# Patient Record
Sex: FEMALE | Race: WHITE | NOT HISPANIC OR LATINO | Employment: OTHER | URBAN - METROPOLITAN AREA
[De-identification: names, ages, dates, MRNs, and addresses within clinical notes are randomized per-mention and may not be internally consistent; named-entity substitution may affect disease eponyms.]

---

## 2017-01-17 ENCOUNTER — OFFICE VISIT (OUTPATIENT)
Dept: FAMILY MEDICINE CLINIC | Facility: CLINIC | Age: 42
End: 2017-01-17

## 2017-01-17 VITALS
HEIGHT: 67 IN | TEMPERATURE: 98.2 F | BODY MASS INDEX: 30.29 KG/M2 | SYSTOLIC BLOOD PRESSURE: 120 MMHG | OXYGEN SATURATION: 98 % | RESPIRATION RATE: 16 BRPM | DIASTOLIC BLOOD PRESSURE: 72 MMHG | WEIGHT: 193 LBS | HEART RATE: 68 BPM

## 2017-01-17 DIAGNOSIS — F51.01 PRIMARY INSOMNIA: ICD-10-CM

## 2017-01-17 DIAGNOSIS — Z13.9 SCREENING: Primary | ICD-10-CM

## 2017-01-17 DIAGNOSIS — F41.9 ANXIETY: ICD-10-CM

## 2017-01-17 LAB
ALBUMIN SERPL-MCNC: 5.3 G/DL (ref 3.5–5.2)
ALBUMIN/GLOB SERPL: 2.3 G/DL
ALP SERPL-CCNC: 58 U/L (ref 39–117)
ALT SERPL-CCNC: 35 U/L (ref 1–33)
AST SERPL-CCNC: 26 U/L (ref 1–32)
BASOPHILS # BLD AUTO: 0.01 10*3/MM3 (ref 0–0.2)
BASOPHILS NFR BLD AUTO: 0.2 % (ref 0–1.5)
BILIRUB SERPL-MCNC: 0.5 MG/DL (ref 0.1–1.2)
BUN SERPL-MCNC: 11 MG/DL (ref 6–20)
BUN/CREAT SERPL: 11 (ref 7–25)
CALCIUM SERPL-MCNC: 10.1 MG/DL (ref 8.6–10.5)
CHLORIDE SERPL-SCNC: 98 MMOL/L (ref 98–107)
CO2 SERPL-SCNC: 24.6 MMOL/L (ref 22–29)
CREAT SERPL-MCNC: 1 MG/DL (ref 0.57–1)
EOSINOPHIL # BLD AUTO: 0.05 10*3/MM3 (ref 0–0.7)
EOSINOPHIL NFR BLD AUTO: 0.8 % (ref 0.3–6.2)
ERYTHROCYTE [DISTWIDTH] IN BLOOD BY AUTOMATED COUNT: 12.3 % (ref 11.7–13)
GLOBULIN SER CALC-MCNC: 2.3 GM/DL
GLUCOSE SERPL-MCNC: 96 MG/DL (ref 65–99)
HCT VFR BLD AUTO: 43.2 % (ref 35.6–45.5)
HGB BLD-MCNC: 13.8 G/DL (ref 11.9–15.5)
IMM GRANULOCYTES # BLD: 0.02 10*3/MM3 (ref 0–0.03)
IMM GRANULOCYTES NFR BLD: 0.3 % (ref 0–0.5)
LYMPHOCYTES # BLD AUTO: 1.9 10*3/MM3 (ref 0.9–4.8)
LYMPHOCYTES NFR BLD AUTO: 29.2 % (ref 19.6–45.3)
MCH RBC QN AUTO: 31.9 PG (ref 26.9–32)
MCHC RBC AUTO-ENTMCNC: 31.9 G/DL (ref 32.4–36.3)
MCV RBC AUTO: 99.8 FL (ref 80.5–98.2)
MONOCYTES # BLD AUTO: 0.76 10*3/MM3 (ref 0.2–1.2)
MONOCYTES NFR BLD AUTO: 11.7 % (ref 5–12)
NEUTROPHILS # BLD AUTO: 3.76 10*3/MM3 (ref 1.9–8.1)
NEUTROPHILS NFR BLD AUTO: 57.8 % (ref 42.7–76)
PLATELET # BLD AUTO: 278 10*3/MM3 (ref 140–500)
POTASSIUM SERPL-SCNC: 4.4 MMOL/L (ref 3.5–5.2)
PROT SERPL-MCNC: 7.6 G/DL (ref 6–8.5)
RBC # BLD AUTO: 4.33 10*6/MM3 (ref 3.9–5.2)
SODIUM SERPL-SCNC: 139 MMOL/L (ref 136–145)
WBC # BLD AUTO: 6.5 10*3/MM3 (ref 4.5–10.7)

## 2017-01-17 PROCEDURE — 99213 OFFICE O/P EST LOW 20 MIN: CPT | Performed by: INTERNAL MEDICINE

## 2017-01-17 RX ORDER — CLONAZEPAM 0.5 MG/1
0.5 TABLET ORAL 2 TIMES DAILY PRN
Qty: 60 TABLET | Refills: 2 | Status: SHIPPED | OUTPATIENT
Start: 2017-01-17 | End: 2017-03-13 | Stop reason: SDUPTHER

## 2017-01-17 RX ORDER — SPIRONOLACTONE 100 MG/1
100 TABLET, FILM COATED ORAL DAILY
Qty: 90 TABLET | Refills: 3 | Status: SHIPPED | OUTPATIENT
Start: 2017-01-17 | End: 2017-12-06

## 2017-01-17 RX ORDER — ZOLPIDEM TARTRATE 10 MG/1
10 TABLET ORAL NIGHTLY
Qty: 30 TABLET | Refills: 2 | Status: SHIPPED | OUTPATIENT
Start: 2017-01-17 | End: 2017-06-09 | Stop reason: SDUPTHER

## 2017-01-17 NOTE — MR AVS SNAPSHOT
Radha Barry   1/17/2017 9:30 AM   Office Visit    Dept Phone:  [phone]   Encounter #:  [id number]    Provider:  Adalberto Livingston MD   Department:  [facility]                Your Full Care Plan              Where to Get Your Medications      These medications were sent to [id number] - [place], [place] - [address] AT [facility] [address] - [phone] PH - [phone] FX  [address], [place] [address]     Phone:  [phone]     spironolactone 100 MG tablet         You can get these medications from any pharmacy     Bring a paper prescription for each of these medications     clonazePAM 0.5 MG tablet    zolpidem 10 MG tablet            Your Updated Medication List          This list is accurate as of: 1/17/17  9:47 AM.  Always use your most recent med list.                azithromycin 250 MG tablet   Commonly known as:  ZITHROMAX Z-BARBARA   Take 2 tablets the first day, then 1 tablet daily for 4 days.       clonazePAM 0.5 MG tablet   Commonly known as:  KlonoPIN   Take 1 tablet by mouth 2 (Two) Times a Day As Needed for anxiety.       ibuprofen 800 MG tablet   Commonly known as:  ADVIL,MOTRIN   Take 1 tablet by mouth every 8 (eight) hours as needed for mild pain (1-3) or moderate pain (4-6).       omeprazole 40 MG capsule   Commonly known as:  priLOSEC   Take 1 capsule by mouth Daily.       spironolactone 100 MG tablet   Commonly known as:  ALDACTONE   Take 1 tablet by mouth Daily.       zolpidem 10 MG tablet   Commonly known as:  AMBIEN   Take 1 tablet by mouth Every Night.               We Performed the Following     CBC & Differential     Comprehensive Metabolic Panel       You Were Diagnosed With        Codes Comments    Screening    -  Primary ICD-10-CM: Z13.9  ICD-9-CM: V82.9       Instructions     None    Patient Instructions History      Upcoming Appointments     Visit Type Date Time Department  "   OFFICE VISIT 1/17/2017  9:30 AM CONCHITA MCDONALD      [employer] Signup     Our records indicate that you have an active [facility] account.    You can view your After Visit Summary by going to [email or web address] and logging in with your [employer] username and password.  If you don't have a [employer] username and password but a parent or guardian has access to your record, the parent or guardian should login with their own [employer] username and password and access your record to view the After Visit Summary.    If you have questions, you can email [email or web address] or call [phone] to talk to our [employer] staff.  Remember, [employer] is NOT to be used for urgent needs.  For medical emergencies, dial 911.               Other Info from Your Visit           Allergies     Penicillins        Reason for Visit     Insomnia med check       Vital Signs     Blood Pressure Pulse Temperature Respirations Height Weight    120/72 68 98.2 °F (36.8 °C) (Oral) 16 67\" (170.2 cm) 193 lb (87.5 kg)    Oxygen Saturation Body Mass Index Smoking Status             98% 30.23 kg/m2 Former Smoker         Problems and Diagnoses Noted     Screening    -  Primary        "

## 2017-01-17 NOTE — PROGRESS NOTES
Subjective   Radha Barry is a 41 y.o. female. Patient is here today for   Chief Complaint   Patient presents with   • Insomnia     med check           Vitals:    01/17/17 0925   BP: 120/72   Pulse: 68   Resp: 16   Temp: 98.2 °F (36.8 °C)   SpO2: 98%       Past Medical History   Diagnosis Date   • GERD (gastroesophageal reflux disease)    • Insomnia       Allergies   Allergen Reactions   • Penicillins       Social History     Social History   • Marital status:      Spouse name: N/A   • Number of children: N/A   • Years of education: N/A     Occupational History   • Not on file.     Social History Main Topics   • Smoking status: Former Smoker   • Smokeless tobacco: Not on file   • Alcohol use Yes      Comment: SOCIAL   • Drug use: Not on file   • Sexual activity: Not on file     Other Topics Concern   • Not on file     Social History Narrative        Current Outpatient Prescriptions:   •  azithromycin (ZITHROMAX Z-BARBARA) 250 MG tablet, Take 2 tablets the first day, then 1 tablet daily for 4 days., Disp: 6 tablet, Rfl: 0  •  clonazePAM (KlonoPIN) 0.5 MG tablet, Take 1 tablet by mouth 2 (Two) Times a Day As Needed for anxiety., Disp: 60 tablet, Rfl: 2  •  ibuprofen (ADVIL,MOTRIN) 800 MG tablet, Take 1 tablet by mouth every 8 (eight) hours as needed for mild pain (1-3) or moderate pain (4-6)., Disp: 90 tablet, Rfl: 3  •  omeprazole (priLOSEC) 40 MG capsule, Take 1 capsule by mouth Daily., Disp: 30 capsule, Rfl: 5  •  spironolactone (ALDACTONE) 100 MG tablet, Take 1 tablet by mouth Daily., Disp: 90 tablet, Rfl: 3  •  zolpidem (AMBIEN) 10 MG tablet, Take 1 tablet by mouth Every Night., Disp: 30 tablet, Rfl: 2     Objective     HPI Comments: She takes clonazepam twice a day sparingly for situational anxiety.  She takes zolpidem 10 mg by mouth daily at bedtime when necessary for insomnia (she takes it most nights).    She would like a refill these medications.    She claims to feel well.    Insomnia          Review  of Systems   Constitutional: Negative.    HENT: Negative.    Respiratory: Negative.    Cardiovascular: Negative.    Musculoskeletal: Negative.    Psychiatric/Behavioral: The patient has insomnia.        Physical Exam   Constitutional: She is oriented to person, place, and time. She appears well-developed.   Cardiovascular: Normal rate.    Pulmonary/Chest: Effort normal and breath sounds normal.   Neurological: She is alert and oriented to person, place, and time.   Psychiatric: She has a normal mood and affect. Her behavior is normal.   Nursing note and vitals reviewed.        Problem List Items Addressed This Visit        Other    Anxiety    Primary insomnia      Other Visit Diagnoses     Screening    -  Primary    Relevant Orders    Comprehensive Metabolic Panel    CBC & Differential            PLAN  Her anxiety appears to be well-controlled.    Her primary insomnia appears to be appropriately treated although I did caution her about the potential for dependency on both the medication she takes for her generalized anxiety and her insomnia.    She does take Spiriva like to for a skin condition she has had) acne).  She takes this on a regular basis.  I think it would be wise to check a CBC and comprehensive metabolic panel on her.    Like to have her back in about 6 months to see how she is doing.  No Follow-up on file.

## 2017-02-17 RX ORDER — CLONAZEPAM 0.5 MG/1
TABLET ORAL
Qty: 60 TABLET | Refills: 0 | OUTPATIENT
Start: 2017-02-17

## 2017-03-01 RX ORDER — AZITHROMYCIN 250 MG/1
TABLET, FILM COATED ORAL
Qty: 6 TABLET | Refills: 0 | OUTPATIENT
Start: 2017-03-01

## 2017-03-13 RX ORDER — CLONAZEPAM 0.5 MG/1
0.5 TABLET ORAL 2 TIMES DAILY PRN
Qty: 60 TABLET | Refills: 2 | Status: SHIPPED | OUTPATIENT
Start: 2017-03-13 | End: 2017-06-09 | Stop reason: SDUPTHER

## 2017-03-14 ENCOUNTER — TELEPHONE (OUTPATIENT)
Dept: FAMILY MEDICINE CLINIC | Facility: CLINIC | Age: 42
End: 2017-03-14

## 2017-03-14 NOTE — TELEPHONE ENCOUNTER
Spoke to patient and informed her that the prescription was ready for    ----- Message from Vikki Valdivia sent at 3/13/2017  8:40 AM EDT -----  CLONAZEPAM REFILL PLEASE CALL PT WHEN READY [phone]

## 2017-06-09 ENCOUNTER — OFFICE VISIT (OUTPATIENT)
Dept: FAMILY MEDICINE CLINIC | Facility: CLINIC | Age: 42
End: 2017-06-09

## 2017-06-09 VITALS
TEMPERATURE: 98.2 F | DIASTOLIC BLOOD PRESSURE: 64 MMHG | HEIGHT: 67 IN | HEART RATE: 77 BPM | OXYGEN SATURATION: 99 % | SYSTOLIC BLOOD PRESSURE: 118 MMHG | WEIGHT: 194 LBS | BODY MASS INDEX: 30.45 KG/M2 | RESPIRATION RATE: 16 BRPM

## 2017-06-09 DIAGNOSIS — F51.01 PRIMARY INSOMNIA: Primary | ICD-10-CM

## 2017-06-09 DIAGNOSIS — F41.9 ANXIETY: ICD-10-CM

## 2017-06-09 PROCEDURE — 99213 OFFICE O/P EST LOW 20 MIN: CPT | Performed by: INTERNAL MEDICINE

## 2017-06-09 RX ORDER — ZOLPIDEM TARTRATE 10 MG/1
10 TABLET ORAL NIGHTLY
Qty: 30 TABLET | Refills: 2 | Status: SHIPPED | OUTPATIENT
Start: 2017-06-09 | End: 2017-12-04 | Stop reason: SDUPTHER

## 2017-06-09 RX ORDER — CLONAZEPAM 0.5 MG/1
0.5 TABLET ORAL 2 TIMES DAILY PRN
Qty: 60 TABLET | Refills: 2 | Status: SHIPPED | OUTPATIENT
Start: 2017-06-09 | End: 2017-09-15 | Stop reason: SDUPTHER

## 2017-06-09 NOTE — PROGRESS NOTES
Subjective   Radha Barry is a 42 y.o. female. Patient is here today for   Chief Complaint   Patient presents with   • Anxiety     med check    • Insomnia          Vitals:    06/09/17 1254   BP: 118/64   Pulse: 77   Resp: 16   Temp: 98.2 °F (36.8 °C)   SpO2: 99%       Past Medical History:   Diagnosis Date   • GERD (gastroesophageal reflux disease)    • Insomnia       Allergies   Allergen Reactions   • Penicillins       Social History     Social History   • Marital status:      Spouse name: N/A   • Number of children: N/A   • Years of education: N/A     Occupational History   • Not on file.     Social History Main Topics   • Smoking status: Former Smoker   • Smokeless tobacco: Not on file   • Alcohol use Yes      Comment: SOCIAL   • Drug use: Not on file   • Sexual activity: Not on file     Other Topics Concern   • Not on file     Social History Narrative        Current Outpatient Prescriptions:   •  azithromycin (ZITHROMAX Z-BARABRA) 250 MG tablet, Take 2 tablets the first day, then 1 tablet daily for 4 days., Disp: 6 tablet, Rfl: 0  •  clonazePAM (KlonoPIN) 0.5 MG tablet, Take 1 tablet by mouth 2 (Two) Times a Day As Needed for Anxiety., Disp: 60 tablet, Rfl: 2  •  ibuprofen (ADVIL,MOTRIN) 800 MG tablet, Take 1 tablet by mouth every 8 (eight) hours as needed for mild pain (1-3) or moderate pain (4-6)., Disp: 90 tablet, Rfl: 3  •  omeprazole (priLOSEC) 40 MG capsule, Take 1 capsule by mouth Daily., Disp: 30 capsule, Rfl: 5  •  spironolactone (ALDACTONE) 100 MG tablet, Take 1 tablet by mouth Daily., Disp: 90 tablet, Rfl: 3  •  zolpidem (AMBIEN) 10 MG tablet, Take 1 tablet by mouth Every Night., Disp: 30 tablet, Rfl: 2     Objective     HPI Comments: She complains of anxiety and insomnia which she feels is well-controlled with infrequent Klonopin 0.5 mg tablets once daily, and Ambien 10 mg daily at bedtime.    She would like to get a refill on these medications.    Anxiety   Symptoms include insomnia.        Insomnia          Review of Systems   Constitutional: Negative.    HENT: Negative.    Respiratory: Negative.    Cardiovascular: Negative.    Genitourinary: Negative.    Hematological: Negative.    Psychiatric/Behavioral: The patient has insomnia.        Physical Exam   Constitutional: She is oriented to person, place, and time. She appears well-developed and well-nourished.   HENT:   Head: Normocephalic and atraumatic.   Pulmonary/Chest: Effort normal.   Neurological: She is alert and oriented to person, place, and time.   Psychiatric: She has a normal mood and affect. Her behavior is normal.   Nursing note and vitals reviewed.        Problem List Items Addressed This Visit        Other    Anxiety    Primary insomnia - Primary            PLAN  I refilled her clonazepam and Ambien.    I asked her to follow-up with me for a comprehensive physical examination once yearly.  No Follow-up on file.

## 2017-08-21 ENCOUNTER — APPOINTMENT (OUTPATIENT)
Dept: WOMENS IMAGING | Facility: HOSPITAL | Age: 42
End: 2017-08-21

## 2017-08-21 PROCEDURE — 77063 BREAST TOMOSYNTHESIS BI: CPT | Performed by: RADIOLOGY

## 2017-08-21 PROCEDURE — 77067 SCR MAMMO BI INCL CAD: CPT | Performed by: RADIOLOGY

## 2017-08-21 RX ORDER — OMEPRAZOLE 40 MG/1
CAPSULE, DELAYED RELEASE ORAL
Qty: 30 CAPSULE | Refills: 0 | Status: SHIPPED | OUTPATIENT
Start: 2017-08-21 | End: 2017-09-20 | Stop reason: SDUPTHER

## 2017-09-15 RX ORDER — CLONAZEPAM 0.5 MG/1
0.5 TABLET ORAL 2 TIMES DAILY PRN
Qty: 60 TABLET | Refills: 2 | Status: SHIPPED | OUTPATIENT
Start: 2017-09-15 | End: 2017-12-04 | Stop reason: SDUPTHER

## 2017-09-18 ENCOUNTER — OFFICE VISIT (OUTPATIENT)
Dept: FAMILY MEDICINE CLINIC | Facility: CLINIC | Age: 42
End: 2017-09-18

## 2017-09-18 VITALS
HEIGHT: 67 IN | BODY MASS INDEX: 32.08 KG/M2 | DIASTOLIC BLOOD PRESSURE: 64 MMHG | TEMPERATURE: 98 F | SYSTOLIC BLOOD PRESSURE: 108 MMHG | RESPIRATION RATE: 16 BRPM | OXYGEN SATURATION: 99 % | HEART RATE: 76 BPM | WEIGHT: 204.4 LBS

## 2017-09-18 DIAGNOSIS — F41.9 ANXIETY: Primary | ICD-10-CM

## 2017-09-18 PROCEDURE — 99213 OFFICE O/P EST LOW 20 MIN: CPT | Performed by: NURSE PRACTITIONER

## 2017-09-18 NOTE — PROGRESS NOTES
Subjective   Radha Barry is a 42 y.o. female. Patient is here today for   Chief Complaint   Patient presents with   • Anxiety     refill clonazapam           Vitals:    09/18/17 1031   BP: 108/64   Pulse: 76   Resp: 16   Temp: 98 °F (36.7 °C)   SpO2: 99%     The following portions of the patient's history were reviewed and updated as appropriate: allergies, current medications, past family history, past medical history, past social history, past surgical history and problem list.    Past Medical History:   Diagnosis Date   • GERD (gastroesophageal reflux disease)    • Insomnia       Allergies   Allergen Reactions   • Penicillins       Social History     Social History   • Marital status:      Spouse name: N/A   • Number of children: N/A   • Years of education: N/A     Occupational History   • Not on file.     Social History Main Topics   • Smoking status: Former Smoker   • Smokeless tobacco: Former User   • Alcohol use Yes      Comment: SOCIAL   • Drug use: Not on file   • Sexual activity: Not on file     Other Topics Concern   • Not on file     Social History Narrative        Current Outpatient Prescriptions:   •  clonazePAM (KlonoPIN) 0.5 MG tablet, Take 1 tablet by mouth 2 (Two) Times a Day As Needed for Anxiety., Disp: 60 tablet, Rfl: 2  •  ibuprofen (ADVIL,MOTRIN) 800 MG tablet, Take 1 tablet by mouth every 8 (eight) hours as needed for mild pain (1-3) or moderate pain (4-6)., Disp: 90 tablet, Rfl: 3  •  omeprazole (priLOSEC) 40 MG capsule, TAKE 1 CAPSULE BY MOUTH DAILY, Disp: 30 capsule, Rfl: 0  •  spironolactone (ALDACTONE) 100 MG tablet, Take 1 tablet by mouth Daily., Disp: 90 tablet, Rfl: 3  •  zolpidem (AMBIEN) 10 MG tablet, Take 1 tablet by mouth Every Night., Disp: 30 tablet, Rfl: 2     Objective     History of Present Illness  Radha is a patient of Dr Livingston who is here for a medication follow up. She has a history of anxiety and takes clonazepam as needed. She is compliant with her medication  and is not experiencing any side effects.     Review of Systems   Constitutional: Negative for chills, diaphoresis and fatigue.   Respiratory: Negative.    Cardiovascular: Negative.    Psychiatric/Behavioral: The patient is nervous/anxious.        Physical Exam   Constitutional: Vital signs are normal. No distress.   Cardiovascular: Normal rate and regular rhythm.    Pulmonary/Chest: Effort normal and breath sounds normal.   Neurological: She is alert.   Skin: Skin is warm and dry.   Psychiatric: She has a normal mood and affect.       ASSESSMENT     Problem List Items Addressed This Visit     Anxiety - Primary          PLAN  Clonazepam refilled per Dr Miki west reviewed and is appropriate  Follow up in 6 months for a med check or sooner if needed

## 2017-09-20 RX ORDER — OMEPRAZOLE 40 MG/1
CAPSULE, DELAYED RELEASE ORAL
Qty: 30 CAPSULE | Refills: 0 | Status: SHIPPED | OUTPATIENT
Start: 2017-09-20 | End: 2017-10-19 | Stop reason: SDUPTHER

## 2017-10-20 RX ORDER — OMEPRAZOLE 40 MG/1
CAPSULE, DELAYED RELEASE ORAL
Qty: 30 CAPSULE | Refills: 0 | Status: SHIPPED | OUTPATIENT
Start: 2017-10-20 | End: 2017-11-24 | Stop reason: SDUPTHER

## 2017-11-27 RX ORDER — OMEPRAZOLE 40 MG/1
CAPSULE, DELAYED RELEASE ORAL
Qty: 30 CAPSULE | Refills: 0 | Status: SHIPPED | OUTPATIENT
Start: 2017-11-27 | End: 2017-12-06 | Stop reason: SDUPTHER

## 2017-12-04 ENCOUNTER — TELEPHONE (OUTPATIENT)
Dept: FAMILY MEDICINE CLINIC | Facility: CLINIC | Age: 42
End: 2017-12-04

## 2017-12-04 RX ORDER — ZOLPIDEM TARTRATE 10 MG/1
10 TABLET ORAL NIGHTLY
Qty: 30 TABLET | Refills: 2 | Status: SHIPPED | OUTPATIENT
Start: 2017-12-04 | End: 2018-03-01 | Stop reason: SDUPTHER

## 2017-12-04 RX ORDER — CLONAZEPAM 0.5 MG/1
0.5 TABLET ORAL 2 TIMES DAILY PRN
Qty: 60 TABLET | Refills: 2 | Status: SHIPPED | OUTPATIENT
Start: 2017-12-04 | End: 2018-03-01 | Stop reason: SDUPTHER

## 2017-12-04 NOTE — TELEPHONE ENCOUNTER
Prescriptions given to Randal   ----- Message from Mark Anthony Vega MA sent at 12/1/2017  3:54 PM EST -----  Contact: PT   PLEASE PRINT OUT SCRIPT FOR AMBIEN AND KLONAZAPAM. PT COMING IN OF DEC 6

## 2017-12-06 ENCOUNTER — TELEPHONE (OUTPATIENT)
Dept: FAMILY MEDICINE CLINIC | Facility: CLINIC | Age: 42
End: 2017-12-06

## 2017-12-06 ENCOUNTER — OFFICE VISIT (OUTPATIENT)
Dept: FAMILY MEDICINE CLINIC | Facility: CLINIC | Age: 42
End: 2017-12-06

## 2017-12-06 VITALS
BODY MASS INDEX: 33.56 KG/M2 | HEIGHT: 67 IN | HEART RATE: 73 BPM | TEMPERATURE: 97.9 F | RESPIRATION RATE: 16 BRPM | OXYGEN SATURATION: 98 % | DIASTOLIC BLOOD PRESSURE: 60 MMHG | WEIGHT: 213.8 LBS | SYSTOLIC BLOOD PRESSURE: 110 MMHG

## 2017-12-06 DIAGNOSIS — F51.01 PRIMARY INSOMNIA: ICD-10-CM

## 2017-12-06 DIAGNOSIS — J06.9 ACUTE URI: ICD-10-CM

## 2017-12-06 DIAGNOSIS — F41.9 ANXIETY: Primary | ICD-10-CM

## 2017-12-06 PROCEDURE — 99213 OFFICE O/P EST LOW 20 MIN: CPT | Performed by: NURSE PRACTITIONER

## 2017-12-06 RX ORDER — BENZONATATE 200 MG/1
200 CAPSULE ORAL 3 TIMES DAILY PRN
Qty: 30 CAPSULE | Refills: 1 | Status: SHIPPED | OUTPATIENT
Start: 2017-12-06 | End: 2018-03-01

## 2017-12-06 RX ORDER — OMEPRAZOLE 40 MG/1
40 CAPSULE, DELAYED RELEASE ORAL DAILY
Qty: 30 CAPSULE | Refills: 0 | Status: SHIPPED | OUTPATIENT
Start: 2017-12-06 | End: 2018-02-10 | Stop reason: SDUPTHER

## 2017-12-06 NOTE — TELEPHONE ENCOUNTER
rx sent to [facility]  ----- Message from Mark Anthony eVga MA sent at 12/6/2017  2:39 PM EST -----      ----- Message -----     From: Kerrie Curtis     Sent: 12/6/2017   1:54 PM       To: Mark Anthony Vega MA    PT HAS DECIDED THAT SHE WANTS THAT COUGH MEDICINE [facility] SUGGESTED FOR HER     PLEASE CALL INTO PHARMACY   [facility] IN [place]    PLEASE CALL PT WITH ANY QUESTIONS   [phone]

## 2017-12-06 NOTE — PROGRESS NOTES
Subjective   Radha Barry is a 42 y.o. female.   Chief Complaint   Patient presents with   • Anxiety     refill clonazepam    • Insomnia     refill zolpidem      Vitals:    12/06/17 1254   BP: 110/60   Pulse: 73   Resp: 16   Temp: 97.9 °F (36.6 °C)   SpO2: 98%     Patient's last menstrual period was 11/27/2017.    History of Present Illness  Radha is a patient of Dr Livingston who is here for a medication follow up. She has a history of insomnia and anxiety. She takes ambien and clonazepam. She is compliant with her medication and is not experiencing any side effects.   She also c/o nasal congestion, drainage, sore throat, and ear pressure that has been going on for 2-3 days. Her  has the same symptoms. She has not tried anything otc        The following portions of the patient's history were reviewed and updated as appropriate: allergies, current medications, past family history, past medical history, past social history, past surgical history and problem list.    Review of Systems   Constitutional: Positive for fatigue. Negative for chills and fever.   HENT: Positive for congestion, ear pain and sore throat.    Respiratory: Positive for cough.    Cardiovascular: Negative.    Psychiatric/Behavioral: Positive for sleep disturbance. The patient is nervous/anxious.        Objective   Physical Exam   Constitutional: Vital signs are normal. She appears well-developed and well-nourished. She appears ill. No distress.   HENT:   Right Ear: Ear canal normal. A middle ear effusion is present.   Left Ear: Ear canal normal. A middle ear effusion is present.   Nose: Mucosal edema and rhinorrhea present.   Mouth/Throat: Uvula is midline. Posterior oropharyngeal erythema present.   Cardiovascular: Normal rate and regular rhythm.    Pulmonary/Chest: Effort normal and breath sounds normal.   Neurological: She is alert.   Skin: Skin is warm and dry.   Psychiatric: She has a normal mood and affect.       Assessment/Plan   Radha  was seen today for anxiety and insomnia.    Diagnoses and all orders for this visit:    Anxiety    Primary insomnia    Acute URI    Other orders  -     omeprazole (priLOSEC) 40 MG capsule; Take 1 capsule by mouth Daily.    1. URI  Symptom treatment for 7-10 days  Work note given for 3 days   Rest and fluids  Tylenol or motrin   Avoid second hand smoke and allergens   Offered tessalon perles but declined   Throat lozenges, humidifier, vicks vapor rub as needed  Follow up if your symptoms persist past 7-10 days or sooner if your symptoms worsen or if you develop new symptoms     2. ambien and clonazepam written per Jacob Tomlinson reviewed     3. Omeprazole refilled per Dr Livingston   Follow up in 3 months for a med check or sooner if needed

## 2018-02-12 RX ORDER — OMEPRAZOLE 40 MG/1
CAPSULE, DELAYED RELEASE ORAL
Qty: 30 CAPSULE | Refills: 0 | Status: SHIPPED | OUTPATIENT
Start: 2018-02-12 | End: 2018-03-14 | Stop reason: SDUPTHER

## 2018-03-01 ENCOUNTER — OFFICE VISIT (OUTPATIENT)
Dept: FAMILY MEDICINE CLINIC | Facility: CLINIC | Age: 43
End: 2018-03-01

## 2018-03-01 VITALS
SYSTOLIC BLOOD PRESSURE: 110 MMHG | OXYGEN SATURATION: 98 % | TEMPERATURE: 97.8 F | WEIGHT: 211 LBS | DIASTOLIC BLOOD PRESSURE: 70 MMHG | HEART RATE: 75 BPM | HEIGHT: 67 IN | RESPIRATION RATE: 16 BRPM | BODY MASS INDEX: 33.12 KG/M2

## 2018-03-01 DIAGNOSIS — F51.01 PRIMARY INSOMNIA: Primary | ICD-10-CM

## 2018-03-01 DIAGNOSIS — F41.9 ANXIETY: ICD-10-CM

## 2018-03-01 DIAGNOSIS — J20.9 ACUTE BRONCHITIS, UNSPECIFIED ORGANISM: ICD-10-CM

## 2018-03-01 PROCEDURE — 99214 OFFICE O/P EST MOD 30 MIN: CPT | Performed by: INTERNAL MEDICINE

## 2018-03-01 RX ORDER — AZITHROMYCIN 250 MG/1
TABLET, FILM COATED ORAL
Qty: 6 TABLET | Refills: 0 | Status: SHIPPED | OUTPATIENT
Start: 2018-03-01 | End: 2018-09-19

## 2018-03-01 RX ORDER — NORETHINDRONE AND ETHINYL ESTRADIOL 0.4-0.035
KIT ORAL
Refills: 3 | COMMUNITY
Start: 2018-02-22 | End: 2019-04-02

## 2018-03-01 RX ORDER — ZOLPIDEM TARTRATE 10 MG/1
10 TABLET ORAL NIGHTLY
Qty: 30 TABLET | Refills: 2 | Status: SHIPPED | OUTPATIENT
Start: 2018-03-01 | End: 2018-04-11

## 2018-03-01 RX ORDER — CLONAZEPAM 0.5 MG/1
0.5 TABLET ORAL 2 TIMES DAILY PRN
Qty: 60 TABLET | Refills: 2 | Status: SHIPPED | OUTPATIENT
Start: 2018-03-01 | End: 2018-06-04

## 2018-03-06 PROBLEM — J20.9 ACUTE BRONCHITIS: Status: ACTIVE | Noted: 2018-03-06

## 2018-03-06 NOTE — PROGRESS NOTES
Subjective   Radha Barry is a 42 y.o. female. Patient is here today for   Chief Complaint   Patient presents with   • Cough   • Insomnia     refill zolpidem    • Anxiety     refill clonazepam           Vitals:    03/01/18 1110   BP: 110/70   Pulse: 75   Resp: 16   Temp: 97.8 °F (36.6 °C)   SpO2: 98%       Past Medical History:   Diagnosis Date   • GERD (gastroesophageal reflux disease)    • Insomnia       Allergies   Allergen Reactions   • Penicillins       Social History     Social History   • Marital status:      Spouse name: N/A   • Number of children: N/A   • Years of education: N/A     Occupational History   • Not on file.     Social History Main Topics   • Smoking status: Former Smoker   • Smokeless tobacco: Former User   • Alcohol use Yes      Comment: SOCIAL   • Drug use: Not on file   • Sexual activity: Not on file     Other Topics Concern   • Not on file     Social History Narrative        Current Outpatient Prescriptions:   •  azithromycin (ZITHROMAX) 250 MG tablet, Take 2 tablets the first day, then 1 tablet daily for 4 days., Disp: 6 tablet, Rfl: 0  •  clonazePAM (KlonoPIN) 0.5 MG tablet, Take 1 tablet by mouth 2 (Two) Times a Day As Needed for Anxiety., Disp: 60 tablet, Rfl: 2  •  ibuprofen (ADVIL,MOTRIN) 800 MG tablet, Take 1 tablet by mouth every 8 (eight) hours as needed for mild pain (1-3) or moderate pain (4-6)., Disp: 90 tablet, Rfl: 3  •  omeprazole (priLOSEC) 40 MG capsule, TAKE 1 CAPSULE BY MOUTH DAILY, Disp: 30 capsule, Rfl: 0  •  VYFEMLA 0.4-35 MG-MCG per tablet, TK 1 T PO D CONTINOUSLY SKIPPING PLACEBO AND GOING TO NEXT PACK, Disp: , Rfl: 3  •  zolpidem (AMBIEN) 10 MG tablet, Take 1 tablet by mouth Every Night., Disp: 30 tablet, Rfl: 2     Objective     HPI Comments: She complains of a cough for the last 10 days or so.      She has chronic sleep onset insomnia.  She finds that Ambien works well to manage this and she requested refill.    She has situational anxiety and takes  clonazepam 0.5 mg twice a day.  She finds this works well for her and she requested refill.    Cough     Insomnia   Associated symptoms include coughing.   Anxiety   Symptoms include insomnia.            Review of Systems   Constitutional: Negative.    HENT: Negative.    Respiratory: Positive for cough.    Cardiovascular: Negative.    Musculoskeletal: Negative.    Psychiatric/Behavioral: The patient has insomnia.        Physical Exam   Constitutional: She is oriented to person, place, and time. She appears well-developed and well-nourished.   HENT:   Head: Normocephalic and atraumatic.   Cardiovascular: Normal rate and regular rhythm.    Pulmonary/Chest: Effort normal. No respiratory distress. She has no wheezes. She has no rales.   She has rhonchi which clear with cough bilaterally.   Neurological: She is alert and oriented to person, place, and time.   Psychiatric: She has a normal mood and affect. Her behavior is normal.   Nursing note and vitals reviewed.        Problem List Items Addressed This Visit        Respiratory    Acute bronchitis       Other    Anxiety    Primary insomnia - Primary            PLAN  I gave her Zithromax for acute bronchitis.    I did refill her Ambien and her clonazepam.  I asked her to use these medications sparingly.  Only gave her a three-month supply she were to take these medications on a scheduled basis.  If she took it less frequently it would last her longer.    No Follow-up on file.

## 2018-03-14 RX ORDER — OMEPRAZOLE 40 MG/1
CAPSULE, DELAYED RELEASE ORAL
Qty: 30 CAPSULE | Refills: 0 | Status: SHIPPED | OUTPATIENT
Start: 2018-03-14 | End: 2018-04-26 | Stop reason: SDUPTHER

## 2018-04-11 RX ORDER — ZOLPIDEM TARTRATE 10 MG/1
TABLET ORAL
Qty: 30 TABLET | Refills: 0 | Status: CANCELLED | OUTPATIENT
Start: 2018-04-11

## 2018-04-11 RX ORDER — ZOLPIDEM TARTRATE 10 MG/1
10 TABLET ORAL NIGHTLY
Qty: 30 TABLET | Refills: 2 | Status: SHIPPED | OUTPATIENT
Start: 2018-04-11 | End: 2018-06-05 | Stop reason: SDUPTHER

## 2018-04-27 RX ORDER — OMEPRAZOLE 40 MG/1
CAPSULE, DELAYED RELEASE ORAL
Qty: 30 CAPSULE | Refills: 0 | Status: SHIPPED | OUTPATIENT
Start: 2018-04-27 | End: 2018-05-25 | Stop reason: SDUPTHER

## 2018-05-25 RX ORDER — OMEPRAZOLE 40 MG/1
CAPSULE, DELAYED RELEASE ORAL
Qty: 30 CAPSULE | Refills: 0 | Status: SHIPPED | OUTPATIENT
Start: 2018-05-25 | End: 2018-07-01 | Stop reason: SDUPTHER

## 2018-06-04 RX ORDER — CLONAZEPAM 0.5 MG/1
0.5 TABLET ORAL 2 TIMES DAILY PRN
Qty: 60 TABLET | Refills: 2 | Status: SHIPPED | OUTPATIENT
Start: 2018-06-04 | End: 2018-08-30 | Stop reason: SDUPTHER

## 2018-06-04 RX ORDER — CLONAZEPAM 0.5 MG/1
TABLET ORAL
Qty: 60 TABLET | Refills: 0 | OUTPATIENT
Start: 2018-06-04

## 2018-06-04 RX ORDER — ZOLPIDEM TARTRATE 10 MG/1
10 TABLET ORAL NIGHTLY
Qty: 30 TABLET | Refills: 0 | OUTPATIENT
Start: 2018-06-04

## 2018-06-04 RX ORDER — ZOLPIDEM TARTRATE 10 MG/1
TABLET ORAL
Qty: 30 TABLET | Refills: 0 | Status: CANCELLED | OUTPATIENT
Start: 2018-06-04

## 2018-06-05 RX ORDER — ZOLPIDEM TARTRATE 10 MG/1
10 TABLET ORAL NIGHTLY
Qty: 30 TABLET | Refills: 2 | Status: SHIPPED | OUTPATIENT
Start: 2018-06-05 | End: 2018-08-30 | Stop reason: SDUPTHER

## 2018-07-02 RX ORDER — OMEPRAZOLE 40 MG/1
CAPSULE, DELAYED RELEASE ORAL
Qty: 30 CAPSULE | Refills: 0 | Status: SHIPPED | OUTPATIENT
Start: 2018-07-02 | End: 2018-08-01 | Stop reason: SDUPTHER

## 2018-08-02 RX ORDER — OMEPRAZOLE 40 MG/1
CAPSULE, DELAYED RELEASE ORAL
Qty: 30 CAPSULE | Refills: 5 | Status: SHIPPED | OUTPATIENT
Start: 2018-08-02 | End: 2019-02-19 | Stop reason: SDUPTHER

## 2018-08-30 RX ORDER — CLONAZEPAM 0.5 MG/1
0.5 TABLET ORAL 2 TIMES DAILY PRN
Qty: 60 TABLET | Refills: 2 | Status: SHIPPED | OUTPATIENT
Start: 2018-08-30 | End: 2018-09-19 | Stop reason: SDUPTHER

## 2018-08-30 RX ORDER — ZOLPIDEM TARTRATE 10 MG/1
10 TABLET ORAL NIGHTLY
Qty: 30 TABLET | Refills: 2 | Status: SHIPPED | OUTPATIENT
Start: 2018-08-30 | End: 2018-09-19 | Stop reason: SDUPTHER

## 2018-09-19 ENCOUNTER — OFFICE VISIT (OUTPATIENT)
Dept: FAMILY MEDICINE CLINIC | Facility: CLINIC | Age: 43
End: 2018-09-19

## 2018-09-19 VITALS
TEMPERATURE: 97.4 F | WEIGHT: 209.6 LBS | HEIGHT: 67 IN | DIASTOLIC BLOOD PRESSURE: 70 MMHG | RESPIRATION RATE: 16 BRPM | HEART RATE: 75 BPM | BODY MASS INDEX: 32.9 KG/M2 | OXYGEN SATURATION: 98 % | SYSTOLIC BLOOD PRESSURE: 120 MMHG

## 2018-09-19 DIAGNOSIS — F41.9 ANXIETY: Primary | ICD-10-CM

## 2018-09-19 DIAGNOSIS — F51.01 PRIMARY INSOMNIA: ICD-10-CM

## 2018-09-19 PROCEDURE — 99213 OFFICE O/P EST LOW 20 MIN: CPT | Performed by: INTERNAL MEDICINE

## 2018-09-19 RX ORDER — INFLUENZA A VIRUS A/SINGAPORE/GP1908/2015 IVR-180A (H1N1) ANTIGEN (PROPIOLACTONE INACTIVATED), INFLUENZA A VIRUS A/SINGAPORE/INFIMH-16-0019/2016 IVR-186 (H3N2) ANTIGEN (PROPIOLACTONE INACTIVATED), INFLUENZA B VIRUS B/MARYLAND/15/2016 ANTIGEN (PROPIOLACTONE INACTIVATED), AND INFLUENZA B VIRUS B/PHUKET/3073/2013 BVR-1B ANTIGEN (PROPIOLACTONE INACTIVATED) 15; 15; 15; 15 UG/.5ML; UG/.5ML; UG/.5ML; UG/.5ML
INJECTION, SUSPENSION INTRAMUSCULAR
COMMUNITY
Start: 2018-09-08 | End: 2019-01-08

## 2018-09-19 RX ORDER — CLONAZEPAM 0.5 MG/1
0.5 TABLET ORAL 2 TIMES DAILY PRN
Qty: 60 TABLET | Refills: 2 | Status: SHIPPED | OUTPATIENT
Start: 2018-09-19 | End: 2018-12-17

## 2018-09-19 RX ORDER — ZOLPIDEM TARTRATE 10 MG/1
10 TABLET ORAL NIGHTLY
Qty: 30 TABLET | Refills: 2 | Status: SHIPPED | OUTPATIENT
Start: 2018-09-19 | End: 2019-03-14 | Stop reason: SDUPTHER

## 2018-09-23 NOTE — PROGRESS NOTES
Subjective   Radha Barry is a 43 y.o. female. Patient is here today for   Chief Complaint   Patient presents with   • Insomnia     refill zolpidem   • Anxiety     refill clonazepam           Vitals:    09/19/18 1041   BP: 120/70   Pulse: 75   Resp: 16   Temp: 97.4 °F (36.3 °C)   SpO2: 98%       Past Medical History:   Diagnosis Date   • GERD (gastroesophageal reflux disease)    • Insomnia       Allergies   Allergen Reactions   • Penicillins       Social History     Social History   • Marital status:      Spouse name: N/A   • Number of children: N/A   • Years of education: N/A     Occupational History   • Not on file.     Social History Main Topics   • Smoking status: Former Smoker   • Smokeless tobacco: Former User   • Alcohol use Yes      Comment: SOCIAL   • Drug use: Unknown   • Sexual activity: Not on file     Other Topics Concern   • Not on file     Social History Narrative   • No narrative on file        Current Outpatient Prescriptions:   •  AFLURIA QUADRIVALENT 0.5 ML suspension prefilled syringe injection, , Disp: , Rfl:   •  clonazePAM (KlonoPIN) 0.5 MG tablet, Take 1 tablet by mouth 2 (Two) Times a Day As Needed for Anxiety., Disp: 60 tablet, Rfl: 2  •  ibuprofen (ADVIL,MOTRIN) 800 MG tablet, Take 1 tablet by mouth every 8 (eight) hours as needed for mild pain (1-3) or moderate pain (4-6)., Disp: 90 tablet, Rfl: 3  •  omeprazole (priLOSEC) 40 MG capsule, TAKE ONE CAPSULE BY MOUTH EVERY DAY, Disp: 30 capsule, Rfl: 5  •  VYFEMLA 0.4-35 MG-MCG per tablet, TK 1 T PO D CONTINOUSLY SKIPPING PLACEBO AND GOING TO NEXT PACK, Disp: , Rfl: 3  •  zolpidem (AMBIEN) 10 MG tablet, Take 1 tablet by mouth Every Night., Disp: 30 tablet, Rfl: 2     Objective     This patient has generalized anxiety disorder and chronic idiopathic sleep onset insomnia.  She is here today to get a refill on clonazepam and Ambien.  She feels these medications work well for her.      Insomnia     Anxiety   Symptoms include insomnia.             Review of Systems   Constitutional: Negative.    HENT: Negative.    Eyes: Negative.    Respiratory: Negative.    Cardiovascular: Negative.    Psychiatric/Behavioral: Positive for sleep disturbance. The patient has insomnia.         She has generalized anxiety and insomnia.  She feels these chronic problems are well-controlled on clonazepam and Ambien respectively.       Physical Exam   Constitutional: She is oriented to person, place, and time. She appears well-developed and well-nourished.   Pleasant, neatly groomed, BMI 32.   HENT:   Head: Normocephalic and atraumatic.   Pulmonary/Chest: Effort normal.   Neurological: She is alert and oriented to person, place, and time.   Psychiatric: She has a normal mood and affect. Her behavior is normal.   Nursing note and vitals reviewed.        Problem List Items Addressed This Visit        Other    Anxiety - Primary    Primary insomnia            PLAN  Her generalized anxiety and insomnia are well-controlled on clonazepam and Ambien.  A refill of these medications for her today.    She tells me that she follows up with gynecology regularly.    I asked her to follow-up for a comprehensive physical examination occasionally.  No Follow-up on file.

## 2018-12-16 ENCOUNTER — APPOINTMENT (OUTPATIENT)
Dept: CT IMAGING | Facility: HOSPITAL | Age: 43
End: 2018-12-16

## 2018-12-16 ENCOUNTER — HOSPITAL ENCOUNTER (EMERGENCY)
Facility: HOSPITAL | Age: 43
Discharge: HOME | End: 2018-12-16
Attending: EMERGENCY MEDICINE | Admitting: EMERGENCY MEDICINE

## 2018-12-16 VITALS
HEART RATE: 86 BPM | OXYGEN SATURATION: 97 % | WEIGHT: 214.9 LBS | BODY MASS INDEX: 34.54 KG/M2 | HEIGHT: 66 IN | TEMPERATURE: 98.6 F | SYSTOLIC BLOOD PRESSURE: 139 MMHG | RESPIRATION RATE: 16 BRPM | DIASTOLIC BLOOD PRESSURE: 83 MMHG

## 2018-12-16 DIAGNOSIS — K52.9 COLITIS: Primary | ICD-10-CM

## 2018-12-16 DIAGNOSIS — D25.9 UTERINE LEIOMYOMA, UNSPECIFIED LOCATION: ICD-10-CM

## 2018-12-16 LAB
ALBUMIN SERPL-MCNC: 4.1 G/DL (ref 3.5–5.2)
ALBUMIN/GLOB SERPL: 1.5 G/DL
ALP SERPL-CCNC: 47 U/L (ref 40–129)
ALT SERPL W P-5'-P-CCNC: 21 U/L (ref 5–33)
ANION GAP SERPL CALCULATED.3IONS-SCNC: 14.6 MMOL/L
AST SERPL-CCNC: 16 U/L (ref 5–32)
BACTERIA UR QL AUTO: ABNORMAL /HPF
BASOPHILS # BLD AUTO: 0.01 10*3/MM3 (ref 0–0.2)
BASOPHILS NFR BLD AUTO: 0.1 % (ref 0–2)
BILIRUB SERPL-MCNC: 0.3 MG/DL (ref 0.2–1.2)
BILIRUB UR QL STRIP: NEGATIVE
BUN BLD-MCNC: 12 MG/DL (ref 6–20)
BUN/CREAT SERPL: 17.4 (ref 7–25)
CALCIUM SPEC-SCNC: 9 MG/DL (ref 8.6–10.5)
CHLORIDE SERPL-SCNC: 102 MMOL/L (ref 98–107)
CLARITY UR: CLEAR
CO2 SERPL-SCNC: 20.4 MMOL/L (ref 22–29)
COLOR UR: YELLOW
CREAT BLD-MCNC: 0.69 MG/DL (ref 0.57–1)
DEPRECATED RDW RBC AUTO: 41.4 FL (ref 37–54)
EOSINOPHIL # BLD AUTO: 0.07 10*3/MM3 (ref 0.1–0.3)
EOSINOPHIL NFR BLD AUTO: 0.7 % (ref 0–4)
ERYTHROCYTE [DISTWIDTH] IN BLOOD BY AUTOMATED COUNT: 12.6 % (ref 11.5–14.5)
GFR SERPL CREATININE-BSD FRML MDRD: 94 ML/MIN/1.73
GLOBULIN UR ELPH-MCNC: 2.7 GM/DL
GLUCOSE BLD-MCNC: 130 MG/DL (ref 65–99)
GLUCOSE UR STRIP-MCNC: NEGATIVE MG/DL
HCG SERPL QL: NEGATIVE
HCT VFR BLD AUTO: 37.1 % (ref 37–47)
HGB BLD-MCNC: 12.4 G/DL (ref 12–16)
HGB UR QL STRIP.AUTO: ABNORMAL
HYALINE CASTS UR QL AUTO: ABNORMAL /LPF
IMM GRANULOCYTES # BLD: 0.03 10*3/MM3 (ref 0–0.03)
IMM GRANULOCYTES NFR BLD: 0.3 % (ref 0–0.5)
KETONES UR QL STRIP: NEGATIVE
LEUKOCYTE ESTERASE UR QL STRIP.AUTO: NEGATIVE
LIPASE SERPL-CCNC: 30 U/L (ref 13–60)
LYMPHOCYTES # BLD AUTO: 1.51 10*3/MM3 (ref 0.6–4.8)
LYMPHOCYTES NFR BLD AUTO: 16.1 % (ref 20–45)
MCH RBC QN AUTO: 30.2 PG (ref 27–31)
MCHC RBC AUTO-ENTMCNC: 33.4 G/DL (ref 31–37)
MCV RBC AUTO: 90.5 FL (ref 81–99)
MONOCYTES # BLD AUTO: 0.69 10*3/MM3 (ref 0–1)
MONOCYTES NFR BLD AUTO: 7.3 % (ref 3–8)
MUCOUS THREADS URNS QL MICRO: ABNORMAL /HPF
NEUTROPHILS # BLD AUTO: 7.08 10*3/MM3 (ref 1.5–8.3)
NEUTROPHILS NFR BLD AUTO: 75.5 % (ref 45–70)
NITRITE UR QL STRIP: NEGATIVE
NRBC BLD MANUAL-RTO: 0 /100 WBC (ref 0–0)
PH UR STRIP.AUTO: 5.5 [PH] (ref 4.5–8)
PLATELET # BLD AUTO: 267 10*3/MM3 (ref 140–500)
PMV BLD AUTO: 9.9 FL (ref 7.4–10.4)
POTASSIUM BLD-SCNC: 3.5 MMOL/L (ref 3.5–5.2)
PROT SERPL-MCNC: 6.8 G/DL (ref 6–8.5)
PROT UR QL STRIP: NEGATIVE
RBC # BLD AUTO: 4.1 10*6/MM3 (ref 4.2–5.4)
RBC # UR: ABNORMAL /HPF
REF LAB TEST METHOD: ABNORMAL
SODIUM BLD-SCNC: 137 MMOL/L (ref 136–145)
SP GR UR STRIP: 1.02 (ref 1–1.03)
SQUAMOUS #/AREA URNS HPF: ABNORMAL /HPF
UROBILINOGEN UR QL STRIP: ABNORMAL
WBC NRBC COR # BLD: 9.39 10*3/MM3 (ref 4.8–10.8)
WBC UR QL AUTO: ABNORMAL /HPF

## 2018-12-16 PROCEDURE — 85025 COMPLETE CBC W/AUTO DIFF WBC: CPT | Performed by: EMERGENCY MEDICINE

## 2018-12-16 PROCEDURE — 0 IOPAMIDOL PER 1 ML: Performed by: EMERGENCY MEDICINE

## 2018-12-16 PROCEDURE — 25010000002 ONDANSETRON PER 1 MG: Performed by: EMERGENCY MEDICINE

## 2018-12-16 PROCEDURE — 83690 ASSAY OF LIPASE: CPT | Performed by: EMERGENCY MEDICINE

## 2018-12-16 PROCEDURE — 84703 CHORIONIC GONADOTROPIN ASSAY: CPT | Performed by: EMERGENCY MEDICINE

## 2018-12-16 PROCEDURE — 80053 COMPREHEN METABOLIC PANEL: CPT | Performed by: EMERGENCY MEDICINE

## 2018-12-16 PROCEDURE — 0 DIATRIZOATE MEGLUMINE & SODIUM PER 1 ML: Performed by: EMERGENCY MEDICINE

## 2018-12-16 PROCEDURE — 96374 THER/PROPH/DIAG INJ IV PUSH: CPT

## 2018-12-16 PROCEDURE — 81001 URINALYSIS AUTO W/SCOPE: CPT | Performed by: EMERGENCY MEDICINE

## 2018-12-16 PROCEDURE — 74177 CT ABD & PELVIS W/CONTRAST: CPT

## 2018-12-16 PROCEDURE — 99283 EMERGENCY DEPT VISIT LOW MDM: CPT

## 2018-12-16 PROCEDURE — 99284 EMERGENCY DEPT VISIT MOD MDM: CPT | Performed by: EMERGENCY MEDICINE

## 2018-12-16 RX ORDER — CIPROFLOXACIN 500 MG/1
500 TABLET, FILM COATED ORAL 2 TIMES DAILY
Qty: 20 TABLET | Refills: 0 | Status: SHIPPED | OUTPATIENT
Start: 2018-12-16 | End: 2018-12-26

## 2018-12-16 RX ORDER — ONDANSETRON 2 MG/ML
4 INJECTION INTRAMUSCULAR; INTRAVENOUS ONCE
Status: COMPLETED | OUTPATIENT
Start: 2018-12-16 | End: 2018-12-16

## 2018-12-16 RX ORDER — HYDROCODONE BITARTRATE AND ACETAMINOPHEN 5; 325 MG/1; MG/1
1-2 TABLET ORAL EVERY 4 HOURS PRN
Qty: 24 TABLET | Refills: 0 | Status: SHIPPED | OUTPATIENT
Start: 2018-12-16 | End: 2019-03-14

## 2018-12-16 RX ORDER — METRONIDAZOLE 500 MG/1
500 TABLET ORAL 3 TIMES DAILY
Qty: 30 TABLET | Refills: 0 | Status: SHIPPED | OUTPATIENT
Start: 2018-12-16 | End: 2018-12-26

## 2018-12-16 RX ORDER — SODIUM CHLORIDE 0.9 % (FLUSH) 0.9 %
10 SYRINGE (ML) INJECTION AS NEEDED
Status: DISCONTINUED | OUTPATIENT
Start: 2018-12-16 | End: 2018-12-16 | Stop reason: HOSPADM

## 2018-12-16 RX ADMIN — ONDANSETRON 4 MG: 2 INJECTION, SOLUTION INTRAMUSCULAR; INTRAVENOUS at 09:45

## 2018-12-16 RX ADMIN — IOPAMIDOL 100 ML: 755 INJECTION, SOLUTION INTRAVENOUS at 11:40

## 2018-12-16 RX ADMIN — DIATRIZOATE MEGLUMINE AND DIATRIZOATE SODIUM 30 ML: 600; 100 SOLUTION ORAL; RECTAL at 09:40

## 2018-12-16 NOTE — ED PROVIDER NOTES
Subjective     History provided by:  Patient    History of Present Illness    · Chief complaint: Abdominal pain    · Location: Left lower quadrant of the abdomen    · Quality/Severity: The pain is severe and constant    · Timing/Onset: Started gradually yesterday morning and by yesterday afternoon and was intense.    · Modifying Factors: Any movement or walking exacerbates her pain    · Associated symptoms: She denies associated nausea, vomiting, urinary symptoms, fever.  She denies any vaginal discharge or bleeding.    · Narrative: The patient is a 43-year-old white female complaining of left lower quadrant abdominal pain that started yesterday.  The pain is been constant.  The pain is exacerbated by moving.  Her past medical history is significant for anxiety, uterine fibroids, ovarian cyst.  Her only surgeries is a .  Her last menstrual period was 6 days ago and she takes birth control pills.  Social history the patient is , she works in an office, she smokes, she rarely drinks.    ED Triage Vitals [18 0915]   Temp Heart Rate Resp BP SpO2   98 °F (36.7 °C) 96 18 145/78 96 %      Temp src Heart Rate Source Patient Position BP Location FiO2 (%)   -- -- -- -- --       Review of Systems   Constitutional: Negative for activity change, appetite change, chills, diaphoresis, fatigue and fever.   HENT: Negative for congestion, dental problem, ear pain, hearing loss, mouth sores, postnasal drip, rhinorrhea, sinus pressure, sore throat and voice change.    Eyes: Negative for photophobia, pain, discharge, redness and visual disturbance.   Respiratory: Negative for cough, chest tightness, shortness of breath, wheezing and stridor.    Cardiovascular: Negative for chest pain, palpitations and leg swelling.   Gastrointestinal: Positive for abdominal pain (left lower quad). Negative for diarrhea, nausea and vomiting.   Genitourinary: Negative for difficulty urinating, dysuria, flank pain, frequency,  hematuria and urgency.   Musculoskeletal: Negative for arthralgias, back pain, gait problem, joint swelling, myalgias, neck pain and neck stiffness.   Skin: Negative for color change and rash.   Neurological: Negative for dizziness, tremors, seizures, syncope, facial asymmetry, speech difficulty, weakness, light-headedness, numbness and headaches.   Hematological: Negative for adenopathy.   Psychiatric/Behavioral: Negative.  Negative for confusion and decreased concentration. The patient is not nervous/anxious.        Past Medical History:   Diagnosis Date   • Anxiety    • GERD (gastroesophageal reflux disease)    • Insomnia    • Ovarian cyst        Allergies   Allergen Reactions   • Penicillins Anaphylaxis       Past Surgical History:   Procedure Laterality Date   • BREAST SURGERY     •  SECTION     • CHOLECYSTECTOMY     • GALLBLADDER SURGERY     • WISDOM TOOTH EXTRACTION         Family History   Problem Relation Age of Onset   • Migraines Mother        Social History     Socioeconomic History   • Marital status:      Spouse name: Not on file   • Number of children: Not on file   • Years of education: Not on file   • Highest education level: Not on file   Tobacco Use   • Smoking status: Former Smoker     Types: Cigarettes     Last attempt to quit: 2008     Years since quitting: 10.9   • Smokeless tobacco: Former User   Substance and Sexual Activity   • Alcohol use: Yes     Alcohol/week: 1.2 oz     Types: 2 Standard drinks or equivalent per week     Comment: SOCIAL   • Drug use: No   • Sexual activity: Defer           Objective   Physical Exam   Constitutional: She is oriented to person, place, and time. She appears well-developed and well-nourished. No distress.   The patient appears in no acute distress.  Vital signs: She is afebrile with temperature 90.8, mildly tachycardic with heart rate 96, blood pressure slightly elevated 145/78, respirations are normal 18 with a normal oxygen saturation  of 96% on room air.   HENT:   Head: Normocephalic and atraumatic.   Nose: Nose normal.   Mouth/Throat: Oropharynx is clear and moist. No oropharyngeal exudate.   Eyes: EOM are normal. Pupils are equal, round, and reactive to light. Right eye exhibits no discharge. Left eye exhibits no discharge. No scleral icterus.   Neck: Normal range of motion. Neck supple. No JVD present. No thyromegaly present.   Cardiovascular: Normal rate, regular rhythm and normal heart sounds.   No murmur heard.  Pulmonary/Chest: Effort normal and breath sounds normal. She has no wheezes. She has no rales. She exhibits no tenderness.   Abdominal: Soft. Bowel sounds are normal. She exhibits no distension. There is tenderness in the left lower quadrant. There is rebound and guarding. No hernia.   Musculoskeletal: Normal range of motion. She exhibits no edema, tenderness or deformity.   Lymphadenopathy:     She has no cervical adenopathy.   Neurological: She is alert and oriented to person, place, and time. No cranial nerve deficit. Coordination normal.   No focal motor sensory deficit   Skin: Skin is warm and dry. Capillary refill takes less than 2 seconds. No rash noted. She is not diaphoretic.   Psychiatric: She has a normal mood and affect. Her behavior is normal. Judgment and thought content normal.   Nursing note and vitals reviewed.      Procedures           ED Course  ED Course as of Dec 16 1305   Sun Dec 16, 2018   1224 [facility] Report [id number]  [TP]   1225 Review the patient's test results: Her CT of the abdomen with oral and IV contrast shows stranding along the descending colon consistent with colitis or mild diverticulitis.  No definite diverticular disease seen.  No obstruction, free air or, or drainable fluid collection.  Negative appendix.  Abnormal appearing uterus with complex fluid-filled cyst or mass.  Per Dr. Millard.  Her CBC had a normal white count of 9.39 with a slight left shift.  Hemoglobin, hematocrit and platelets  within normal limits.  Her CMP had normal electrolytes, normal renal and liver function tests.  Lipase was normal.  Urinalysis was negative for infection.  [TP]   1302 The patient was discharged with prescriptions for Cipro floxacillin 500 mg #20, Flagyl 5 mg #30, and Lortab 5 mg #24.  She is instructed to make an appointment to follow-up with her PCP Dr. Livingston next week.  I also instructed her to follow-up with her gynecologist concerning her uterine fibroid.  [TP]   1303 She was given a work note through Wednesday.  [TP]      ED Course User Index  [TP] Artemio Barrios MD                  MDM  Number of Diagnoses or Management Options  Colitis: new and requires workup  Uterine leiomyoma, unspecified location:      Amount and/or Complexity of Data Reviewed  Clinical lab tests: ordered and reviewed  Tests in the radiology section of CPT®: ordered and reviewed  Independent visualization of images, tracings, or specimens: yes    Risk of Complications, Morbidity, and/or Mortality  Presenting problems: high  Diagnostic procedures: high  Management options: high  General comments: My differential diagnosis for abdominal pain includes but is not limited to:  Gastritis, gastroenteritis, peptic ulcer disease, GERD, esophageal perforation, acute appendicitis, mesenteric adenitis, Meckel’s diverticulum, epiploic appendagitis, diverticulitis, colon cancer, ulcerative colitis, Crohn’s disease, intussusception, small bowel obstruction, adhesions, ischemic bowel, perforated viscus, ileus, obstipation, biliary colic, cholecystitis, cholelithiasis, Genaro-Saravanan Evan, hepatitis, pancreatitis, common bile duct obstruction, cholangitis, bile leak, splenic trauma, splenic rupture, splenic infarction, splenic abscess, abdominal abscess, ascites, spontaneous bacterial peritonitis, hernia, UTI, cystitis, prostatitis, ureterolithiasis, urinary obstruction, ovarian cyst, torsion, pregnancy, ectopic pregnancy, PID, pelvic abscess,  ramiro, endometriosis, AAA, myocardial infarction, pneumonia, cancer, porphyria, DKA, medications, sickle cell, viral syndrome, zoster    Patient Progress  Patient progress: stable        Final diagnoses:   Colitis   Uterine leiomyoma, unspecified location           Labs Reviewed   COMPREHENSIVE METABOLIC PANEL - Abnormal; Notable for the following components:       Result Value    Glucose 130 (*)     CO2 20.4 (*)     All other components within normal limits   URINALYSIS W/ MICROSCOPIC IF INDICATED (NO CULTURE) - Abnormal; Notable for the following components:    Blood, UA Trace (*)     All other components within normal limits   CBC WITH AUTO DIFFERENTIAL - Abnormal; Notable for the following components:    RBC 4.10 (*)     Neutrophil % 75.5 (*)     Lymphocyte % 16.1 (*)     Eosinophils, Absolute 0.07 (*)     All other components within normal limits   URINALYSIS, MICROSCOPIC ONLY - Abnormal; Notable for the following components:    RBC, UA 0-2 (*)     WBC, UA 0-2 (*)     Bacteria, UA Trace (*)     Squamous Epithelial Cells, UA 7-12 (*)     All other components within normal limits   LIPASE - Normal   HCG, SERUM, QUALITATIVE - Normal   CBC AND DIFFERENTIAL    Narrative:     The following orders were created for panel order CBC & Differential.  Procedure                               Abnormality         Status                     ---------                               -----------         ------                     CBC Auto Differential[[id number]]        Abnormal            Final result                 Please view results for these tests on the individual orders.     CT Abdomen Pelvis With Contrast   ED Interpretation   Stranding along the descending colon consistent with colitis is or mild diverticulitis.  No definite diverticular disease seen.  No obstruction, free air or drainable fluid collection.  Negative appendix.  Abnormal appearing uterus with complex fluid-filled cyst or mass.  Needs follow-up with  pelvic ultrasound.  Per Dr. Millard             Medication List      New Prescriptions    ciprofloxacin 500 MG tablet  Commonly known as:  CIPRO  Take 1 tablet by mouth 2 (Two) Times a Day for 10 days.     HYDROcodone-acetaminophen 5-325 MG per tablet  Commonly known as:  NORCO  Take 1-2 tablets by mouth Every 4 (Four) Hours As Needed for Severe Pain    for up to 24 doses.     metroNIDAZOLE 500 MG tablet  Commonly known as:  FLAGYL  Take 1 tablet by mouth 3 (Three) Times a Day for 10 days.               Artemio Barrios MD  12/16/18 8710

## 2018-12-17 RX ORDER — CLONAZEPAM 0.5 MG/1
0.5 TABLET ORAL 2 TIMES DAILY PRN
Qty: 60 TABLET | Refills: 2 | Status: SHIPPED | OUTPATIENT
Start: 2018-12-17 | End: 2019-03-14 | Stop reason: SDUPTHER

## 2018-12-19 ENCOUNTER — TELEPHONE (OUTPATIENT)
Dept: FAMILY MEDICINE CLINIC | Facility: CLINIC | Age: 43
End: 2018-12-19

## 2018-12-19 NOTE — TELEPHONE ENCOUNTER
CALLED PT ADVISED HER PRESCRIPTION WAS READY FOR  IN OUR OFFICE. PT UNDERSTOOD.    ----- Message from Vikki Meeks sent at 12/17/2018  3:23 PM EST -----  PATIENT CALLED AND SAID SHE WAS JUST RELEASED FROM THE HOSPITAL WITH COLITIOUS AND HER CLONAZEPAM 0.5 QTY: 60 1BID WILL RUN OUT ON THE 21ST. SHE WANTS TO KNOW IF DR LONG COULD WRITE IT THIS TIME AND HAVE SOMEONE PICK IT UP BECAUSE IS CANT DRIVE WHILE ON MEDICATION GIVEN BY THE HOSPITAL.    PLEASE CLL PT WITH ANY ISSUES OR WHEN SCRIPT IS READY. [phone]

## 2019-01-08 ENCOUNTER — OFFICE VISIT (OUTPATIENT)
Dept: FAMILY MEDICINE CLINIC | Facility: CLINIC | Age: 44
End: 2019-01-08

## 2019-01-08 VITALS
SYSTOLIC BLOOD PRESSURE: 100 MMHG | TEMPERATURE: 98.1 F | RESPIRATION RATE: 16 BRPM | WEIGHT: 204.6 LBS | HEIGHT: 67 IN | DIASTOLIC BLOOD PRESSURE: 66 MMHG | OXYGEN SATURATION: 99 % | HEART RATE: 77 BPM | BODY MASS INDEX: 32.11 KG/M2

## 2019-01-08 DIAGNOSIS — K52.9 COLITIS: Primary | ICD-10-CM

## 2019-01-08 PROCEDURE — 99213 OFFICE O/P EST LOW 20 MIN: CPT | Performed by: INTERNAL MEDICINE

## 2019-01-08 RX ORDER — CIPROFLOXACIN 500 MG/1
500 TABLET, FILM COATED ORAL 2 TIMES DAILY
Qty: 14 TABLET | Refills: 0 | Status: SHIPPED | OUTPATIENT
Start: 2019-01-08 | End: 2019-02-28

## 2019-01-08 NOTE — PROGRESS NOTES
Subjective   Radha Barry is a 43 y.o. female. Patient is here today for   Chief Complaint   Patient presents with   • Follow-up     HOSPITAL FOLLOWUP COLITIS           Vitals:    01/08/19 1055   BP: 100/66   Pulse: 77   Resp: 16   Temp: 98.1 °F (36.7 °C)   SpO2: 99%       Past Medical History:   Diagnosis Date   • Anxiety    • GERD (gastroesophageal reflux disease)    • Insomnia    • Ovarian cyst       Allergies   Allergen Reactions   • Penicillins Anaphylaxis      Social History     Socioeconomic History   • Marital status:      Spouse name: Not on file   • Number of children: Not on file   • Years of education: Not on file   • Highest education level: Not on file   Social Needs   • Financial resource strain: Not on file   • Food insecurity - worry: Not on file   • Food insecurity - inability: Not on file   • Transportation needs - medical: Not on file   • Transportation needs - non-medical: Not on file   Occupational History   • Not on file   Tobacco Use   • Smoking status: Former Smoker     Types: Cigarettes     Last attempt to quit: 1/16/2008     Years since quitting: 10.9   • Smokeless tobacco: Former User   Substance and Sexual Activity   • Alcohol use: Yes     Alcohol/week: 1.2 oz     Types: 2 Standard drinks or equivalent per week     Comment: SOCIAL   • Drug use: No   • Sexual activity: Defer   Other Topics Concern   • Not on file   Social History Narrative   • Not on file        Current Outpatient Medications:   •  clonazePAM (KlonoPIN) 0.5 MG tablet, Take 1 tablet by mouth 2 (Two) Times a Day As Needed for Anxiety., Disp: 60 tablet, Rfl: 2  •  HYDROcodone-acetaminophen (NORCO) 5-325 MG per tablet, Take 1-2 tablets by mouth Every 4 (Four) Hours As Needed for Severe Pain  for up to 24 doses., Disp: 24 tablet, Rfl: 0  •  ibuprofen (ADVIL,MOTRIN) 800 MG tablet, Take 1 tablet by mouth every 8 (eight) hours as needed for mild pain (1-3) or moderate pain (4-6)., Disp: 90 tablet, Rfl: 3  •  omeprazole  (priLOSEC) 40 MG capsule, TAKE ONE CAPSULE BY MOUTH EVERY DAY, Disp: 30 capsule, Rfl: 5  •  VYFEMLA 0.4-35 MG-MCG per tablet, TK 1 T PO D CONTINOUSLY SKIPPING PLACEBO AND GOING TO NEXT PACK, Disp: , Rfl: 3  •  zolpidem (AMBIEN) 10 MG tablet, Take 1 tablet by mouth Every Night., Disp: 30 tablet, Rfl: 2  •  ciprofloxacin (CIPRO) 500 MG tablet, Take 1 tablet by mouth 2 (Two) Times a Day., Disp: 14 tablet, Rfl: 0     Objective     She presented to [facility], [place], operated by [facility] with abdominal pain.  CAT scan revealed some stranding of the pericolic fat and the descending colon and sigmoid colon.  She was treated with ciprofloxacin and metronidazole for a presumed case of diverticulitis.She was found to have a complicated cystic mass about 4/2 cm in largest dimension present on her uterus.She was asked to follow-up with gynecology at her discharge from the hospital.She is not yet made an appointment to see her gynecologist.She notes that she has occasional bright red blood per rectum since being treated with antibiotics for her abdominal pain.  Her abdominal pain has improved but she continues to have a little blood in her stool.         Review of Systems   Constitutional: Negative.    HENT: Negative.    Respiratory: Negative.    Cardiovascular: Negative.    Gastrointestinal:        Blood in stool   Musculoskeletal: Negative.    Psychiatric/Behavioral: Negative.        Physical Exam   Constitutional: She is oriented to person, place, and time. She appears well-developed and well-nourished.   HENT:   Head: Normocephalic and atraumatic.   Pulmonary/Chest: Effort normal.   Abdominal: Soft. Bowel sounds are normal. She exhibits no distension and no mass. There is tenderness. There is no rebound and no guarding.   She has pain on palpation of the left lower quadrant.  There is no rebound, there is no guarding.   Neurological: She is alert and oriented to person, place, and time.   Psychiatric: She has a normal mood and affect. Her behavior  is normal.   Nursing note and vitals reviewed.        Problem List Items Addressed This Visit        Digestive    Colitis - Primary    Relevant Orders    Ambulatory Referral to Gastroenterology    CBC & Differential    Comprehensive Metabolic Panel            PLAN  She continues to have some blood in her stool in spite of feeling better after taking metronidazole and ciprofloxacin last week.  She has pain on palpation of her left lower quadrant today.  I've given her prescription for ciprofloxacin 500 mg twice a day.1 check CBC and comprehensive metabolic panel.  I've referred her to gastroenterology regarding her quadrant pain.I've asked her make an appointment to follow up with gynecology regarding her complex cystic mass of her uterus.  No Follow-up on file.

## 2019-01-09 LAB
ALBUMIN SERPL-MCNC: 4.5 G/DL (ref 3.5–5.2)
ALBUMIN/GLOB SERPL: 1.6 G/DL
ALP SERPL-CCNC: 42 U/L (ref 39–117)
ALT SERPL-CCNC: 18 U/L (ref 1–33)
AST SERPL-CCNC: 22 U/L (ref 1–32)
BASOPHILS # BLD AUTO: 0.01 10*3/MM3 (ref 0–0.2)
BASOPHILS NFR BLD AUTO: 0.2 % (ref 0–1.5)
BILIRUB SERPL-MCNC: 0.3 MG/DL (ref 0.1–1.2)
BUN SERPL-MCNC: 10 MG/DL (ref 6–20)
BUN/CREAT SERPL: 11.8 (ref 7–25)
CALCIUM SERPL-MCNC: 10.1 MG/DL (ref 8.6–10.5)
CHLORIDE SERPL-SCNC: 102 MMOL/L (ref 98–107)
CO2 SERPL-SCNC: 24.8 MMOL/L (ref 22–29)
CREAT SERPL-MCNC: 0.85 MG/DL (ref 0.57–1)
EOSINOPHIL # BLD AUTO: 0.06 10*3/MM3 (ref 0–0.7)
EOSINOPHIL NFR BLD AUTO: 1 % (ref 0.3–6.2)
ERYTHROCYTE [DISTWIDTH] IN BLOOD BY AUTOMATED COUNT: 13.1 % (ref 11.7–13)
GLOBULIN SER CALC-MCNC: 2.8 GM/DL
GLUCOSE SERPL-MCNC: 102 MG/DL (ref 65–99)
HCT VFR BLD AUTO: 40.2 % (ref 35.6–45.5)
HGB BLD-MCNC: 12.4 G/DL (ref 11.9–15.5)
IMM GRANULOCYTES # BLD AUTO: 0 10*3/MM3 (ref 0–0.03)
IMM GRANULOCYTES NFR BLD AUTO: 0 % (ref 0–0.5)
LYMPHOCYTES # BLD AUTO: 1.67 10*3/MM3 (ref 0.9–4.8)
LYMPHOCYTES NFR BLD AUTO: 28.7 % (ref 19.6–45.3)
MCH RBC QN AUTO: 29.3 PG (ref 26.9–32)
MCHC RBC AUTO-ENTMCNC: 30.8 G/DL (ref 32.4–36.3)
MCV RBC AUTO: 95 FL (ref 80.5–98.2)
MONOCYTES # BLD AUTO: 0.68 10*3/MM3 (ref 0.2–1.2)
MONOCYTES NFR BLD AUTO: 11.7 % (ref 5–12)
NEUTROPHILS # BLD AUTO: 3.4 10*3/MM3 (ref 1.9–8.1)
NEUTROPHILS NFR BLD AUTO: 58.4 % (ref 42.7–76)
PLATELET # BLD AUTO: 287 10*3/MM3 (ref 140–500)
POTASSIUM SERPL-SCNC: 4.6 MMOL/L (ref 3.5–5.2)
PROT SERPL-MCNC: 7.3 G/DL (ref 6–8.5)
RBC # BLD AUTO: 4.23 10*6/MM3 (ref 3.9–5.2)
SODIUM SERPL-SCNC: 141 MMOL/L (ref 136–145)
WBC # BLD AUTO: 5.82 10*3/MM3 (ref 4.5–10.7)

## 2019-01-22 ENCOUNTER — APPOINTMENT (OUTPATIENT)
Dept: WOMENS IMAGING | Facility: HOSPITAL | Age: 44
End: 2019-01-22

## 2019-01-22 PROCEDURE — 77067 SCR MAMMO BI INCL CAD: CPT | Performed by: RADIOLOGY

## 2019-01-22 PROCEDURE — 77063 BREAST TOMOSYNTHESIS BI: CPT | Performed by: RADIOLOGY

## 2019-02-20 RX ORDER — OMEPRAZOLE 40 MG/1
CAPSULE, DELAYED RELEASE ORAL
Qty: 30 CAPSULE | Refills: 5 | Status: SHIPPED | OUTPATIENT
Start: 2019-02-20 | End: 2019-04-02

## 2019-02-27 NOTE — PROGRESS NOTES
Chief Complaint   Patient presents with   • Rectal Bleeding   • Diarrhea       Subjective     HPI    Radha aBrry is a 43 y.o. female with a past medical history noted below who presents for evaluation of diarrhea and rectal bleeding.  Symptoms have been present since December. At that time, she had developed abdominal pain in the LLQ, painful to the touch, severe enough to send her to the ER. She had some diarrhea with the pain.  She had a CT showing diverticulitis.  She was treated with cipro and flagyl.  Pain improved.  However, she persisted with diarrhea since that time.  Diarrhea is occurring daily.  She describes diarrhea stools,   - usually 3 times per day.  Mostly all within an hour of waking up. Stools are liquid.  No associated pain or cramping.  She is ok for the rest of the day.  She is having some blood with wiping, all bright red. There is some tenderness at her anal area.  She feels her hemorrhoids are flaring from all the diarrhea.    No GI malignancies in her family, mother with polyps.      Cholecystectomy, 2 c sections.    Works from home.      Past Medical History:   Diagnosis Date   • Anxiety    • GERD (gastroesophageal reflux disease)    • Insomnia    • Ovarian cyst          Current Outpatient Medications:   •  clonazePAM (KlonoPIN) 0.5 MG tablet, Take 1 tablet by mouth 2 (Two) Times a Day As Needed for Anxiety., Disp: 60 tablet, Rfl: 2  •  omeprazole (priLOSEC) 40 MG capsule, TAKE ONE CAPSULE BY MOUTH EVERY DAY, Disp: 30 capsule, Rfl: 5  •  zolpidem (AMBIEN) 10 MG tablet, Take 1 tablet by mouth Every Night., Disp: 30 tablet, Rfl: 2  •  HYDROcodone-acetaminophen (NORCO) 5-325 MG per tablet, Take 1-2 tablets by mouth Every 4 (Four) Hours As Needed for Severe Pain  for up to 24 doses., Disp: 24 tablet, Rfl: 0  •  ibuprofen (ADVIL,MOTRIN) 800 MG tablet, Take 1 tablet by mouth every 8 (eight) hours as needed for mild pain (1-3) or moderate pain (4-6)., Disp: 90 tablet, Rfl: 3  •  VYFEMLA 0.4-35  MG-MCG per tablet, TK 1 T PO D CONTINOUSLY SKIPPING PLACEBO AND GOING TO NEXT PACK, Disp: , Rfl: 3    Allergies   Allergen Reactions   • Penicillins Anaphylaxis       Social History     Socioeconomic History   • Marital status:      Spouse name: Not on file   • Number of children: Not on file   • Years of education: Not on file   • Highest education level: Not on file   Social Needs   • Financial resource strain: Not on file   • Food insecurity - worry: Not on file   • Food insecurity - inability: Not on file   • Transportation needs - medical: Not on file   • Transportation needs - non-medical: Not on file   Occupational History   • Not on file   Tobacco Use   • Smoking status: Former Smoker     Types: Cigarettes     Last attempt to quit: 2008     Years since quittin.1   • Smokeless tobacco: Former User   Substance and Sexual Activity   • Alcohol use: Yes     Alcohol/week: 1.2 oz     Types: 2 Standard drinks or equivalent per week     Comment: SOCIAL   • Drug use: No   • Sexual activity: Defer   Other Topics Concern   • Not on file   Social History Narrative   • Not on file       Family History   Problem Relation Age of Onset   • Migraines Mother    • Diverticulitis Mother        Review of Systems   Constitutional: Negative for activity change, appetite change and fatigue.   HENT: Negative for sore throat and trouble swallowing.    Respiratory: Negative.    Cardiovascular: Negative.    Gastrointestinal: Positive for abdominal pain and diarrhea. Negative for abdominal distention and blood in stool.   Endocrine: Negative for cold intolerance and heat intolerance.   Genitourinary: Negative for difficulty urinating, dysuria and frequency.   Musculoskeletal: Negative for arthralgias, back pain and myalgias.   Skin: Negative.    Hematological: Negative for adenopathy. Does not bruise/bleed easily.   All other systems reviewed and are negative.      Objective     Vitals:    19 1058   BP: 130/84    Temp: 98.2 °F (36.8 °C)         02/28/19  1058   Weight: 95.9 kg (211 lb 6.4 oz)     Body mass index is 33.11 kg/m².    Physical Exam   Constitutional: She is oriented to person, place, and time. She appears well-developed and well-nourished. No distress.   HENT:   Head: Normocephalic and atraumatic.   Right Ear: External ear normal.   Left Ear: External ear normal.   Nose: Nose normal.   Mouth/Throat: Oropharynx is clear and moist.   Eyes: Conjunctivae and EOM are normal. Right eye exhibits no discharge. Left eye exhibits no discharge. No scleral icterus.   Neck: Normal range of motion. Neck supple. No thyromegaly present.   No supraclavicular adenopathy   Cardiovascular: Normal rate, regular rhythm, normal heart sounds and intact distal pulses. Exam reveals no gallop.   No murmur heard.  No lower extremity edema   Pulmonary/Chest: Effort normal and breath sounds normal. No respiratory distress. She has no wheezes.   Abdominal: Soft. Normal appearance and bowel sounds are normal. She exhibits no distension and no mass. There is no hepatosplenomegaly. There is no tenderness. There is no rigidity, no rebound and no guarding. No hernia.   Genitourinary:   Genitourinary Comments: Rectal exam deferred   Musculoskeletal: Normal range of motion. She exhibits no edema or tenderness.   No atrophy of upper or lower extremities.  Normal digits and nails of both hands.   Lymphadenopathy:     She has no cervical adenopathy.   Neurological: She is alert and oriented to person, place, and time. She displays no atrophy. Coordination normal.   Skin: Skin is warm and dry. No rash noted. She is not diaphoretic. No erythema.   Psychiatric: She has a normal mood and affect. Her behavior is normal. Judgment and thought content normal.   Vitals reviewed.      WBC   Date Value Ref Range Status   01/08/2019 5.82 4.50 - 10.70 10*3/mm3 Final     RBC   Date Value Ref Range Status   01/08/2019 4.23 3.90 - 5.20 10*6/mm3 Final     Hemoglobin    Date Value Ref Range Status   01/08/2019 12.4 11.9 - 15.5 g/dL Final   12/16/2018 12.4 12.0 - 16.0 g/dL Final     Hematocrit   Date Value Ref Range Status   01/08/2019 40.2 35.6 - 45.5 % Final   12/16/2018 37.1 37.0 - 47.0 % Final     MCV   Date Value Ref Range Status   01/08/2019 95.0 80.5 - 98.2 fL Final   12/16/2018 90.5 81.0 - 99.0 fL Final     MCH   Date Value Ref Range Status   01/08/2019 29.3 26.9 - 32.0 pg Final   12/16/2018 30.2 27.0 - 31.0 pg Final     MCHC   Date Value Ref Range Status   01/08/2019 30.8 (L) 32.4 - 36.3 g/dL Final   12/16/2018 33.4 31.0 - 37.0 g/dL Final     RDW   Date Value Ref Range Status   01/08/2019 13.1 (H) 11.7 - 13.0 % Final   12/16/2018 12.6 11.5 - 14.5 % Final     RDW-SD   Date Value Ref Range Status   12/16/2018 41.4 37.0 - 54.0 fl Final     MPV   Date Value Ref Range Status   12/16/2018 9.9 7.4 - 10.4 fL Final     Platelets   Date Value Ref Range Status   01/08/2019 287 140 - 500 10*3/mm3 Final   12/16/2018 267 140 - 500 10*3/mm3 Final     Neutrophil Rel %   Date Value Ref Range Status   01/08/2019 58.4 42.7 - 76.0 % Final     Neutrophil %   Date Value Ref Range Status   12/16/2018 75.5 (H) 45.0 - 70.0 % Final     Lymphocyte Rel %   Date Value Ref Range Status   01/08/2019 28.7 19.6 - 45.3 % Final     Lymphocyte %   Date Value Ref Range Status   12/16/2018 16.1 (L) 20.0 - 45.0 % Final     Monocyte Rel %   Date Value Ref Range Status   01/08/2019 11.7 5.0 - 12.0 % Final     Monocyte %   Date Value Ref Range Status   12/16/2018 7.3 3.0 - 8.0 % Final     Eosinophil Rel %   Date Value Ref Range Status   01/08/2019 1.0 0.3 - 6.2 % Final     Eosinophil %   Date Value Ref Range Status   12/16/2018 0.7 0.0 - 4.0 % Final     Basophil Rel %   Date Value Ref Range Status   01/08/2019 0.2 0.0 - 1.5 % Final     Basophil %   Date Value Ref Range Status   12/16/2018 0.1 0.0 - 2.0 % Final     Immature Grans %   Date Value Ref Range Status   12/16/2018 0.3 0.0 - 0.5 % Final     Neutrophils  Absolute   Date Value Ref Range Status   01/08/2019 3.40 1.90 - 8.10 10*3/mm3 Final     Neutrophils, Absolute   Date Value Ref Range Status   12/16/2018 7.08 1.50 - 8.30 10*3/mm3 Final     Lymphocytes Absolute   Date Value Ref Range Status   01/08/2019 1.67 0.90 - 4.80 10*3/mm3 Final     Lymphocytes, Absolute   Date Value Ref Range Status   12/16/2018 1.51 0.60 - 4.80 10*3/mm3 Final     Monocytes Absolute   Date Value Ref Range Status   01/08/2019 0.68 0.20 - 1.20 10*3/mm3 Final     Monocytes, Absolute   Date Value Ref Range Status   12/16/2018 0.69 0.00 - 1.00 10*3/mm3 Final     Eosinophils Absolute   Date Value Ref Range Status   01/08/2019 0.06 0.00 - 0.70 10*3/mm3 Final     Eosinophils, Absolute   Date Value Ref Range Status   12/16/2018 0.07 (L) 0.10 - 0.30 10*3/mm3 Final     Basophils Absolute   Date Value Ref Range Status   01/08/2019 0.01 0.00 - 0.20 10*3/mm3 Final     Basophils, Absolute   Date Value Ref Range Status   12/16/2018 0.01 0.00 - 0.20 10*3/mm3 Final     Immature Grans, Absolute   Date Value Ref Range Status   12/16/2018 0.03 0.00 - 0.03 10*3/mm3 Final     nRBC   Date Value Ref Range Status   12/16/2018 0.0 0.0 - 0.0 /100 WBC Final       Glucose   Date Value Ref Range Status   12/16/2018 130 (H) 65 - 99 mg/dL Final     Sodium   Date Value Ref Range Status   01/08/2019 141 136 - 145 mmol/L Final   12/16/2018 137 136 - 145 mmol/L Final     Potassium   Date Value Ref Range Status   01/08/2019 4.6 3.5 - 5.2 mmol/L Final   12/16/2018 3.5 3.5 - 5.2 mmol/L Final     CO2   Date Value Ref Range Status   12/16/2018 20.4 (L) 22.0 - 29.0 mmol/L Final     Total CO2   Date Value Ref Range Status   01/08/2019 24.8 22.0 - 29.0 mmol/L Final     Chloride   Date Value Ref Range Status   01/08/2019 102 98 - 107 mmol/L Final   12/16/2018 102 98 - 107 mmol/L Final     Anion Gap   Date Value Ref Range Status   12/16/2018 14.6 mmol/L Final     Creatinine   Date Value Ref Range Status   01/08/2019 0.85 0.57 - 1.00 mg/dL  Final   12/16/2018 0.69 0.57 - 1.00 mg/dL Final     BUN   Date Value Ref Range Status   01/08/2019 10 6 - 20 mg/dL Final   12/16/2018 12 6 - 20 mg/dL Final     BUN/Creatinine Ratio   Date Value Ref Range Status   01/08/2019 11.8 7.0 - 25.0 Final   12/16/2018 17.4 7.0 - 25.0 Final     Calcium   Date Value Ref Range Status   01/08/2019 10.1 8.6 - 10.5 mg/dL Final   12/16/2018 9.0 8.6 - 10.5 mg/dL Final     eGFR Non  Am   Date Value Ref Range Status   01/08/2019 74 >60 mL/min/1.73 Final     eGFR Non  Amer   Date Value Ref Range Status   12/16/2018 94 >60 mL/min/1.73 Final     Alkaline Phosphatase   Date Value Ref Range Status   01/08/2019 42 39 - 117 U/L Final   12/16/2018 47 40 - 129 U/L Final     Total Protein   Date Value Ref Range Status   12/16/2018 6.8 6.0 - 8.5 g/dL Final     ALT (SGPT)   Date Value Ref Range Status   01/08/2019 18 1 - 33 U/L Final   12/16/2018 21 5 - 33 U/L Final     AST (SGOT)   Date Value Ref Range Status   01/08/2019 22 1 - 32 U/L Final   12/16/2018 16 5 - 32 U/L Final     Total Bilirubin   Date Value Ref Range Status   01/08/2019 0.3 0.1 - 1.2 mg/dL Final   12/16/2018 0.3 0.2 - 1.2 mg/dL Final     Albumin   Date Value Ref Range Status   01/08/2019 4.50 3.50 - 5.20 g/dL Final   12/16/2018 4.10 3.50 - 5.20 g/dL Final     Globulin   Date Value Ref Range Status   12/16/2018 2.7 gm/dL Final     A/G Ratio   Date Value Ref Range Status   01/08/2019 1.6 g/dL Final         IMPRESSION:  1. Minimal inflammatory stranding in the fat adjacent to the distal  descending colon and proximal sigmoid colon area findings may reflect  diverticulitis. Clinical correlation is recommended. No bowel  obstruction or abscess is seen.  2. Normal appendix.  3. Surgical absence of the gallbladder.  4. Complicated cystic mass measuring 4.6 cm along the upper aspect of  the uterus which is new compared with the previous CT scan of the  abdomen and pelvis performed 10/2/2012. Consider nonemergent  correlation  with pelvic ultrasound for further evaluation of this finding.     Initial stat report to the ED by Dr. Millard at 1218 on 12/16/2018.     This report was finalized on 12/17/2018 8:11 AM by Dr. Torito Ulloa MD.      No notes on file    Assessment/Plan    Diarrhea: following diverticulitis.  ? Post infectious IBS vs colitis    Abnl CT abdomen: diverticulitis in December, needs colonoscopy to assess this region of the colon    Diverticulitis: symptoms and imaging consistent with this finding in December    Plan  Colonoscopy for further evaluation of diarrhea and for diverticulitis evaluation  Stool bulking with daily fiber supplementation  I have given her some samples of vascularity used for the hemorrhoid  Imodium as needed for the diarrhea  Further recommendations after her colonoscopy    Radha was seen today for rectal bleeding and diarrhea.    Diagnoses and all orders for this visit:    Abnormal CT of the abdomen  -     Case Request; Standing  -     Follow Anesthesia Guidelines / Standing Orders; Future  -     Implement Anesthesia Orders Day of Procedure; Standing  -     Obtain Informed Consent; Standing  -     Verify bowel prep was successful; Standing  -     lactated ringers infusion; Infuse 30 mL/hr into a venous catheter Continuous.  -     Case Request    Diverticulitis  -     Case Request; Standing  -     Follow Anesthesia Guidelines / Standing Orders; Future  -     Implement Anesthesia Orders Day of Procedure; Standing  -     Obtain Informed Consent; Standing  -     Verify bowel prep was successful; Standing  -     lactated ringers infusion; Infuse 30 mL/hr into a venous catheter Continuous.  -     Case Request    Functional diarrhea        I have discussed the above plan with the patient.  They verbalize understanding and are in agreement with the plan.  They have been advised to contact the office for any questions, concerns, or changes related to their health.    Dictated utilizing  Clyde dictation

## 2019-02-28 ENCOUNTER — OFFICE VISIT (OUTPATIENT)
Dept: GASTROENTEROLOGY | Facility: CLINIC | Age: 44
End: 2019-02-28

## 2019-02-28 VITALS
HEIGHT: 67 IN | BODY MASS INDEX: 33.18 KG/M2 | WEIGHT: 211.4 LBS | SYSTOLIC BLOOD PRESSURE: 130 MMHG | TEMPERATURE: 98.2 F | DIASTOLIC BLOOD PRESSURE: 84 MMHG

## 2019-02-28 DIAGNOSIS — K57.92 DIVERTICULITIS: ICD-10-CM

## 2019-02-28 DIAGNOSIS — K59.1 FUNCTIONAL DIARRHEA: ICD-10-CM

## 2019-02-28 DIAGNOSIS — R93.5 ABNORMAL CT OF THE ABDOMEN: Primary | ICD-10-CM

## 2019-02-28 PROCEDURE — 99204 OFFICE O/P NEW MOD 45 MIN: CPT | Performed by: INTERNAL MEDICINE

## 2019-02-28 RX ORDER — SODIUM CHLORIDE, SODIUM LACTATE, POTASSIUM CHLORIDE, CALCIUM CHLORIDE 600; 310; 30; 20 MG/100ML; MG/100ML; MG/100ML; MG/100ML
30 INJECTION, SOLUTION INTRAVENOUS CONTINUOUS
Status: CANCELLED | OUTPATIENT
Start: 2019-06-14

## 2019-02-28 NOTE — PATIENT INSTRUCTIONS
Schedule the colonoscopy    Take one tablet of imodium nightly    Twice daily fiber supplemenation--benefiber, citrucel, or fiber con    Try the vasculera    For any additional questions, concerns or changes to your condition after today's office visit please contact the office at [phone].

## 2019-03-14 ENCOUNTER — OFFICE VISIT (OUTPATIENT)
Dept: FAMILY MEDICINE CLINIC | Facility: CLINIC | Age: 44
End: 2019-03-14

## 2019-03-14 VITALS
BODY MASS INDEX: 33.27 KG/M2 | WEIGHT: 212 LBS | SYSTOLIC BLOOD PRESSURE: 120 MMHG | HEIGHT: 67 IN | DIASTOLIC BLOOD PRESSURE: 70 MMHG | HEART RATE: 87 BPM | RESPIRATION RATE: 16 BRPM

## 2019-03-14 DIAGNOSIS — F51.01 PRIMARY INSOMNIA: ICD-10-CM

## 2019-03-14 DIAGNOSIS — F41.9 ANXIETY: Primary | ICD-10-CM

## 2019-03-14 PROCEDURE — 99214 OFFICE O/P EST MOD 30 MIN: CPT | Performed by: INTERNAL MEDICINE

## 2019-03-14 RX ORDER — ZOLPIDEM TARTRATE 10 MG/1
10 TABLET ORAL NIGHTLY
Qty: 30 TABLET | Refills: 5 | Status: SHIPPED | OUTPATIENT
Start: 2019-03-14 | End: 2019-08-29 | Stop reason: SDUPTHER

## 2019-03-14 RX ORDER — CLONAZEPAM 0.5 MG/1
0.5 TABLET ORAL 2 TIMES DAILY PRN
Qty: 60 TABLET | Refills: 2 | Status: SHIPPED | OUTPATIENT
Start: 2019-03-14 | End: 2019-06-12 | Stop reason: SDUPTHER

## 2019-03-14 NOTE — PATIENT INSTRUCTIONS
We will continue current medicines.  Jacob report reviewed.  Suggest starting an exercise program.

## 2019-03-14 NOTE — PROGRESS NOTES
Subjective   Radha Barry is a 43 y.o. female. Patient is here today for   Chief Complaint   Patient presents with   • Anxiety     6 month med check    • Insomnia          Vitals:    19 1027   BP: 120/70   Pulse: 87   Resp: 16     The following portions of the patient's history were reviewed and updated as appropriate: allergies, current medications, past family history, past medical history, past social history, past surgical history and problem list.    Past Medical History:   Diagnosis Date   • Anxiety    • GERD (gastroesophageal reflux disease)    • Insomnia    • Ovarian cyst       Allergies   Allergen Reactions   • Penicillins Anaphylaxis      Social History     Socioeconomic History   • Marital status:      Spouse name: Not on file   • Number of children: Not on file   • Years of education: Not on file   • Highest education level: Not on file   Social Needs   • Financial resource strain: Not on file   • Food insecurity - worry: Not on file   • Food insecurity - inability: Not on file   • Transportation needs - medical: Not on file   • Transportation needs - non-medical: Not on file   Occupational History   • Not on file   Tobacco Use   • Smoking status: Former Smoker     Types: Cigarettes     Last attempt to quit: 2008     Years since quittin.1   • Smokeless tobacco: Former User   Substance and Sexual Activity   • Alcohol use: Yes     Alcohol/week: 1.2 oz     Types: 2 Standard drinks or equivalent per week     Comment: SOCIAL   • Drug use: No   • Sexual activity: Defer   Other Topics Concern   • Not on file   Social History Narrative   • Not on file        Current Outpatient Medications:   •  clonazePAM (KlonoPIN) 0.5 MG tablet, Take 1 tablet by mouth 2 (Two) Times a Day As Needed for Anxiety., Disp: 60 tablet, Rfl: 2  •  ibuprofen (ADVIL,MOTRIN) 800 MG tablet, Take 1 tablet by mouth every 8 (eight) hours as needed for mild pain (1-3) or moderate pain (4-6)., Disp: 90 tablet, Rfl:  3  •  omeprazole (priLOSEC) 40 MG capsule, TAKE ONE CAPSULE BY MOUTH EVERY DAY, Disp: 30 capsule, Rfl: 5  •  VYFEMLA 0.4-35 MG-MCG per tablet, TK 1 T PO D CONTINOUSLY SKIPPING PLACEBO AND GOING TO NEXT PACK, Disp: , Rfl: 3  •  zolpidem (AMBIEN) 10 MG tablet, Take 1 tablet by mouth Every Night., Disp: 30 tablet, Rfl: 5     Objective     History of Present Illness Madie is here for 6-month med check.  She has chronic insomnia and intermittent anxiety.  She takes zolpidem 10 mg every night and takes clonazepam 0.5 mg as needed for anxiety.  She takes 1 every few weeks.  She does not exercise.  Zolpidem works well for her.    Review of Systems   Constitutional: Negative.    Respiratory: Negative.    Cardiovascular: Negative.    Psychiatric/Behavioral: Positive for sleep disturbance. The patient is not nervous/anxious.        Physical Exam   Constitutional: She appears well-developed and well-nourished.   Cardiovascular: Normal rate, regular rhythm and normal heart sounds.   Psychiatric: She has a normal mood and affect. Her behavior is normal. Judgment and thought content normal.   Vitals reviewed.      ASSESSMENT     Problem List Items Addressed This Visit        Other    Anxiety - Primary    Primary insomnia          PLAN  Patient Instructions   We will continue current medicines.  Jacob report reviewed.  Suggest starting an exercise program.    Return in about 6 months (around 9/14/2019) for Recheck.

## 2019-04-02 RX ORDER — OMEPRAZOLE 40 MG/1
40 CAPSULE, DELAYED RELEASE ORAL NIGHTLY
COMMUNITY
End: 2019-08-14 | Stop reason: SDUPTHER

## 2019-04-03 ENCOUNTER — ANESTHESIA (OUTPATIENT)
Dept: PERIOP | Facility: HOSPITAL | Age: 44
End: 2019-04-03

## 2019-04-03 ENCOUNTER — ANESTHESIA EVENT (OUTPATIENT)
Dept: PERIOP | Facility: HOSPITAL | Age: 44
End: 2019-04-03

## 2019-04-03 ENCOUNTER — HOSPITAL ENCOUNTER (OUTPATIENT)
Facility: HOSPITAL | Age: 44
Setting detail: HOSPITAL OUTPATIENT SURGERY
Discharge: HOME | End: 2019-04-03
Attending: OBSTETRICS & GYNECOLOGY | Admitting: OBSTETRICS & GYNECOLOGY

## 2019-04-03 VITALS
RESPIRATION RATE: 16 BRPM | HEART RATE: 87 BPM | BODY MASS INDEX: 34.22 KG/M2 | TEMPERATURE: 98 F | HEIGHT: 66 IN | OXYGEN SATURATION: 98 % | SYSTOLIC BLOOD PRESSURE: 127 MMHG | DIASTOLIC BLOOD PRESSURE: 72 MMHG

## 2019-04-03 DIAGNOSIS — N92.0 MENORRHAGIA: ICD-10-CM

## 2019-04-03 LAB
B-HCG UR QL: NEGATIVE
INTERNAL NEGATIVE CONTROL: NEGATIVE
INTERNAL POSITIVE CONTROL: POSITIVE
Lab: NORMAL

## 2019-04-03 PROCEDURE — C1782 MORCELLATOR: HCPCS | Performed by: OBSTETRICS & GYNECOLOGY

## 2019-04-03 PROCEDURE — 88305 TISSUE EXAM BY PATHOLOGIST: CPT | Performed by: OBSTETRICS & GYNECOLOGY

## 2019-04-03 PROCEDURE — 25010000002 ONDANSETRON PER 1 MG: Performed by: NURSE ANESTHETIST, CERTIFIED REGISTERED

## 2019-04-03 PROCEDURE — 25010000002 PROPOFOL 10 MG/ML EMULSION: Performed by: NURSE ANESTHETIST, CERTIFIED REGISTERED

## 2019-04-03 PROCEDURE — 25010000002 KETOROLAC TROMETHAMINE PER 15 MG: Performed by: NURSE ANESTHETIST, CERTIFIED REGISTERED

## 2019-04-03 PROCEDURE — 25010000002 MIDAZOLAM PER 1 MG: Performed by: ANESTHESIOLOGY

## 2019-04-03 PROCEDURE — 25010000002 FENTANYL CITRATE (PF) 100 MCG/2ML SOLUTION: Performed by: NURSE ANESTHETIST, CERTIFIED REGISTERED

## 2019-04-03 PROCEDURE — 81025 URINE PREGNANCY TEST: CPT | Performed by: ANESTHESIOLOGY

## 2019-04-03 PROCEDURE — 25010000002 DEXAMETHASONE PER 1 MG: Performed by: NURSE ANESTHETIST, CERTIFIED REGISTERED

## 2019-04-03 RX ORDER — SODIUM CHLORIDE 0.9 % (FLUSH) 0.9 %
1-10 SYRINGE (ML) INJECTION AS NEEDED
Status: DISCONTINUED | OUTPATIENT
Start: 2019-04-03 | End: 2019-04-03 | Stop reason: HOSPADM

## 2019-04-03 RX ORDER — LIDOCAINE HYDROCHLORIDE 20 MG/ML
INJECTION, SOLUTION INFILTRATION; PERINEURAL AS NEEDED
Status: DISCONTINUED | OUTPATIENT
Start: 2019-04-03 | End: 2019-04-03 | Stop reason: SURG

## 2019-04-03 RX ORDER — PROMETHAZINE HYDROCHLORIDE 25 MG/ML
12.5 INJECTION, SOLUTION INTRAMUSCULAR; INTRAVENOUS ONCE AS NEEDED
Status: DISCONTINUED | OUTPATIENT
Start: 2019-04-03 | End: 2019-04-03 | Stop reason: HOSPADM

## 2019-04-03 RX ORDER — ONDANSETRON 2 MG/ML
INJECTION INTRAMUSCULAR; INTRAVENOUS AS NEEDED
Status: DISCONTINUED | OUTPATIENT
Start: 2019-04-03 | End: 2019-04-03 | Stop reason: SURG

## 2019-04-03 RX ORDER — DEXAMETHASONE SODIUM PHOSPHATE 10 MG/ML
INJECTION INTRAMUSCULAR; INTRAVENOUS AS NEEDED
Status: DISCONTINUED | OUTPATIENT
Start: 2019-04-03 | End: 2019-04-03 | Stop reason: SURG

## 2019-04-03 RX ORDER — OXYCODONE AND ACETAMINOPHEN 7.5; 325 MG/1; MG/1
1 TABLET ORAL ONCE AS NEEDED
Status: DISCONTINUED | OUTPATIENT
Start: 2019-04-03 | End: 2019-04-03 | Stop reason: HOSPADM

## 2019-04-03 RX ORDER — PROMETHAZINE HYDROCHLORIDE 25 MG/1
25 SUPPOSITORY RECTAL ONCE AS NEEDED
Status: DISCONTINUED | OUTPATIENT
Start: 2019-04-03 | End: 2019-04-03 | Stop reason: HOSPADM

## 2019-04-03 RX ORDER — SODIUM CHLORIDE, SODIUM LACTATE, POTASSIUM CHLORIDE, CALCIUM CHLORIDE 600; 310; 30; 20 MG/100ML; MG/100ML; MG/100ML; MG/100ML
9 INJECTION, SOLUTION INTRAVENOUS CONTINUOUS
Status: DISCONTINUED | OUTPATIENT
Start: 2019-04-03 | End: 2019-04-03 | Stop reason: HOSPADM

## 2019-04-03 RX ORDER — SODIUM CHLORIDE 9 MG/ML
INJECTION, SOLUTION INTRAVENOUS AS NEEDED
Status: DISCONTINUED | OUTPATIENT
Start: 2019-04-03 | End: 2019-04-03 | Stop reason: HOSPADM

## 2019-04-03 RX ORDER — ACETAMINOPHEN 650 MG/1
650 SUPPOSITORY RECTAL ONCE AS NEEDED
Status: DISCONTINUED | OUTPATIENT
Start: 2019-04-03 | End: 2019-04-03 | Stop reason: HOSPADM

## 2019-04-03 RX ORDER — FLUMAZENIL 0.1 MG/ML
0.2 INJECTION INTRAVENOUS AS NEEDED
Status: DISCONTINUED | OUTPATIENT
Start: 2019-04-03 | End: 2019-04-03 | Stop reason: HOSPADM

## 2019-04-03 RX ORDER — FENTANYL CITRATE 50 UG/ML
50 INJECTION, SOLUTION INTRAMUSCULAR; INTRAVENOUS
Status: DISCONTINUED | OUTPATIENT
Start: 2019-04-03 | End: 2019-04-03 | Stop reason: HOSPADM

## 2019-04-03 RX ORDER — ONDANSETRON 2 MG/ML
4 INJECTION INTRAMUSCULAR; INTRAVENOUS ONCE AS NEEDED
Status: COMPLETED | OUTPATIENT
Start: 2019-04-03 | End: 2019-04-03

## 2019-04-03 RX ORDER — FENTANYL CITRATE 50 UG/ML
INJECTION, SOLUTION INTRAMUSCULAR; INTRAVENOUS AS NEEDED
Status: DISCONTINUED | OUTPATIENT
Start: 2019-04-03 | End: 2019-04-03 | Stop reason: SURG

## 2019-04-03 RX ORDER — PROMETHAZINE HYDROCHLORIDE 25 MG/1
25 TABLET ORAL ONCE AS NEEDED
Status: DISCONTINUED | OUTPATIENT
Start: 2019-04-03 | End: 2019-04-03 | Stop reason: HOSPADM

## 2019-04-03 RX ORDER — HYDROCODONE BITARTRATE AND ACETAMINOPHEN 7.5; 325 MG/1; MG/1
1 TABLET ORAL ONCE AS NEEDED
Status: COMPLETED | OUTPATIENT
Start: 2019-04-03 | End: 2019-04-03

## 2019-04-03 RX ORDER — MIDAZOLAM HYDROCHLORIDE 1 MG/ML
2 INJECTION INTRAMUSCULAR; INTRAVENOUS
Status: DISCONTINUED | OUTPATIENT
Start: 2019-04-03 | End: 2019-04-03 | Stop reason: HOSPADM

## 2019-04-03 RX ORDER — EPHEDRINE SULFATE 50 MG/ML
5 INJECTION, SOLUTION INTRAVENOUS ONCE AS NEEDED
Status: DISCONTINUED | OUTPATIENT
Start: 2019-04-03 | End: 2019-04-03 | Stop reason: HOSPADM

## 2019-04-03 RX ORDER — MAGNESIUM HYDROXIDE 1200 MG/15ML
LIQUID ORAL AS NEEDED
Status: DISCONTINUED | OUTPATIENT
Start: 2019-04-03 | End: 2019-04-03 | Stop reason: HOSPADM

## 2019-04-03 RX ORDER — KETOROLAC TROMETHAMINE 30 MG/ML
INJECTION, SOLUTION INTRAMUSCULAR; INTRAVENOUS AS NEEDED
Status: DISCONTINUED | OUTPATIENT
Start: 2019-04-03 | End: 2019-04-03 | Stop reason: SURG

## 2019-04-03 RX ORDER — NALOXONE HCL 0.4 MG/ML
0.2 VIAL (ML) INJECTION AS NEEDED
Status: DISCONTINUED | OUTPATIENT
Start: 2019-04-03 | End: 2019-04-03 | Stop reason: HOSPADM

## 2019-04-03 RX ORDER — LIDOCAINE HYDROCHLORIDE 10 MG/ML
0.5 INJECTION, SOLUTION EPIDURAL; INFILTRATION; INTRACAUDAL; PERINEURAL ONCE AS NEEDED
Status: DISCONTINUED | OUTPATIENT
Start: 2019-04-03 | End: 2019-04-03 | Stop reason: HOSPADM

## 2019-04-03 RX ORDER — LABETALOL HYDROCHLORIDE 5 MG/ML
5 INJECTION, SOLUTION INTRAVENOUS
Status: DISCONTINUED | OUTPATIENT
Start: 2019-04-03 | End: 2019-04-03 | Stop reason: HOSPADM

## 2019-04-03 RX ORDER — FAMOTIDINE 10 MG/ML
20 INJECTION, SOLUTION INTRAVENOUS ONCE
Status: COMPLETED | OUTPATIENT
Start: 2019-04-03 | End: 2019-04-03

## 2019-04-03 RX ORDER — MIDAZOLAM HYDROCHLORIDE 1 MG/ML
1 INJECTION INTRAMUSCULAR; INTRAVENOUS
Status: DISCONTINUED | OUTPATIENT
Start: 2019-04-03 | End: 2019-04-03 | Stop reason: HOSPADM

## 2019-04-03 RX ORDER — HYDRALAZINE HYDROCHLORIDE 20 MG/ML
5 INJECTION INTRAMUSCULAR; INTRAVENOUS
Status: DISCONTINUED | OUTPATIENT
Start: 2019-04-03 | End: 2019-04-03 | Stop reason: HOSPADM

## 2019-04-03 RX ORDER — DIPHENHYDRAMINE HCL 25 MG
25 CAPSULE ORAL
Status: DISCONTINUED | OUTPATIENT
Start: 2019-04-03 | End: 2019-04-03 | Stop reason: HOSPADM

## 2019-04-03 RX ORDER — DIPHENHYDRAMINE HYDROCHLORIDE 50 MG/ML
12.5 INJECTION INTRAMUSCULAR; INTRAVENOUS
Status: DISCONTINUED | OUTPATIENT
Start: 2019-04-03 | End: 2019-04-03 | Stop reason: HOSPADM

## 2019-04-03 RX ORDER — HYDROMORPHONE HYDROCHLORIDE 1 MG/ML
0.5 INJECTION, SOLUTION INTRAMUSCULAR; INTRAVENOUS; SUBCUTANEOUS
Status: DISCONTINUED | OUTPATIENT
Start: 2019-04-03 | End: 2019-04-03 | Stop reason: HOSPADM

## 2019-04-03 RX ORDER — ACETAMINOPHEN 325 MG/1
650 TABLET ORAL ONCE AS NEEDED
Status: DISCONTINUED | OUTPATIENT
Start: 2019-04-03 | End: 2019-04-03 | Stop reason: HOSPADM

## 2019-04-03 RX ORDER — PROPOFOL 10 MG/ML
VIAL (ML) INTRAVENOUS AS NEEDED
Status: DISCONTINUED | OUTPATIENT
Start: 2019-04-03 | End: 2019-04-03 | Stop reason: SURG

## 2019-04-03 RX ADMIN — KETOROLAC TROMETHAMINE 30 MG: 30 INJECTION, SOLUTION INTRAMUSCULAR; INTRAVENOUS at 09:20

## 2019-04-03 RX ADMIN — ONDANSETRON 4 MG: 2 INJECTION INTRAMUSCULAR; INTRAVENOUS at 09:59

## 2019-04-03 RX ADMIN — FENTANYL CITRATE 50 MCG: 50 INJECTION INTRAMUSCULAR; INTRAVENOUS at 10:15

## 2019-04-03 RX ADMIN — SODIUM CHLORIDE, POTASSIUM CHLORIDE, SODIUM LACTATE AND CALCIUM CHLORIDE 9 ML/HR: 600; 310; 30; 20 INJECTION, SOLUTION INTRAVENOUS at 09:56

## 2019-04-03 RX ADMIN — DEXAMETHASONE SODIUM PHOSPHATE 8 MG: 10 INJECTION INTRAMUSCULAR; INTRAVENOUS at 08:45

## 2019-04-03 RX ADMIN — LIDOCAINE HYDROCHLORIDE 60 MG: 20 INJECTION, SOLUTION INFILTRATION; PERINEURAL at 08:40

## 2019-04-03 RX ADMIN — FAMOTIDINE 20 MG: 10 INJECTION INTRAVENOUS at 08:26

## 2019-04-03 RX ADMIN — SODIUM CHLORIDE, POTASSIUM CHLORIDE, SODIUM LACTATE AND CALCIUM CHLORIDE: 600; 310; 30; 20 INJECTION, SOLUTION INTRAVENOUS at 08:40

## 2019-04-03 RX ADMIN — FENTANYL CITRATE 50 MCG: 50 INJECTION INTRAMUSCULAR; INTRAVENOUS at 08:42

## 2019-04-03 RX ADMIN — HYDROCODONE BITARTRATE AND ACETAMINOPHEN 1 TABLET: 7.5; 325 TABLET ORAL at 10:13

## 2019-04-03 RX ADMIN — FENTANYL CITRATE 50 MCG: 50 INJECTION INTRAMUSCULAR; INTRAVENOUS at 09:00

## 2019-04-03 RX ADMIN — MIDAZOLAM 2 MG: 1 INJECTION INTRAMUSCULAR; INTRAVENOUS at 08:26

## 2019-04-03 RX ADMIN — ONDANSETRON 4 MG: 2 INJECTION INTRAMUSCULAR; INTRAVENOUS at 08:45

## 2019-04-03 RX ADMIN — PROPOFOL 200 MG: 10 INJECTION, EMULSION INTRAVENOUS at 08:40

## 2019-04-03 RX ADMIN — FENTANYL CITRATE 50 MCG: 50 INJECTION INTRAMUSCULAR; INTRAVENOUS at 09:53

## 2019-04-03 RX ADMIN — SODIUM CHLORIDE, POTASSIUM CHLORIDE, SODIUM LACTATE AND CALCIUM CHLORIDE 9 ML/HR: 600; 310; 30; 20 INJECTION, SOLUTION INTRAVENOUS at 08:25

## 2019-04-03 NOTE — ANESTHESIA PROCEDURE NOTES
Airway  Urgency: elective    Airway not difficult    General Information and Staff    Patient location during procedure: OR  Anesthesiologist: Jennifer Guthrie MD  CRNA: Roberta Maier CRNA    Indications and Patient Condition  Indications for airway management: airway protection    Preoxygenated: yes  MILS not maintained throughout  Mask difficulty assessment: 1 - vent by mask    Final Airway Details  Final airway type: supraglottic airway      Successful airway: classic  Size 4    Number of attempts at approach: 1    Additional Comments  Preoxygenation FEO2 >85, SIVI, LMA placed with ease, teeth/lips as preop. Secured and placement confirmed.

## 2019-04-03 NOTE — H&P
"Assessment/Plan     Menorrhagia and possible endometrial polyp     I had a lengthy discussion with the patient regarding her bleeding and consideration for endometrial ablation.Procedure, risks, reasons, benefits and complications  were reviewed in detail. Consent was signed and preop testing was ordered.    Subjective     Radha Barry is a 44 y.o.  2 para 2, female dx with menorrhagia possible endometrial polyp      The following portions of the patient's history were reviewed and updated as appropriate: allergies, current medications, past family history, past medical history, past social history, past surgical history and problem list.    Review of Systems  Pertinent items are noted in HPI.     Objective     /86   Pulse 85   Temp 97.7 °F (36.5 °C) (Oral)   Resp 16   Ht 167.6 cm (66\")   LMP 2019   SpO2 98% Comment: post versed  BMI 34.22 kg/m²     General:   alert, appears stated age and cooperative   Heart: regular rate and rhythm, S1, S2 normal, no murmur, click, rub or gallop   Lungs: clear to auscultation bilaterally   Abdomen: soft, non-tender, without masses or organomegaly   Vulva: normal   Vagina: normal mucosa   Cervix: no lesions   Uterus: normal size, anteverted   Adnexa: normal adnexa       "

## 2019-04-03 NOTE — ANESTHESIA POSTPROCEDURE EVALUATION
Patient: Radha Barry    Procedure Summary     Date:  04/03/19 Room / Location:   [facility] [address] [place] [address] [facility]    Anesthesia Start:  0838 Anesthesia Stop:  0938    Procedure:  HYSTERSCOPE DILATATION AND CURETTAGE MYOSURE AND NOVASURE ABLATION (N/A Uterus) Diagnosis:      Surgeon:  Abimbola Dumont MD Provider:  Jennifer Guthrie MD    Anesthesia Type:  general ASA Status:  2          Anesthesia Type: general  Last vitals  BP   120/70 (04/03/19 1030)   Temp   36.7 °C (98 °F) (04/03/19 1030)   Pulse   79 (04/03/19 1030)   Resp   16 (04/03/19 1030)     SpO2   98 % (04/03/19 1030)     Post Anesthesia Care and Evaluation    Patient location during evaluation: PHASE II  Patient participation: complete - patient participated  Level of consciousness: awake and alert  Pain management: adequate  Airway patency: patent  Anesthetic complications: No anesthetic complications    Cardiovascular status: acceptable  Respiratory status: acceptable  Hydration status: acceptable

## 2019-04-03 NOTE — ANESTHESIA PREPROCEDURE EVALUATION
Anesthesia Evaluation     Patient summary reviewed and Nursing notes reviewed   no history of anesthetic complications:  NPO Solid Status: > 8 hours  NPO Liquid Status: > 2 hours           Airway   Mallampati: II  TM distance: >3 FB  Neck ROM: full  Dental - normal exam     Pulmonary - normal exam   (+) a smoker Former,   Cardiovascular - negative cardio ROS and normal exam  Exercise tolerance: good (4-7 METS)    Rhythm: regular        Neuro/Psych  (+) psychiatric history Anxiety,     GI/Hepatic/Renal/Endo    (+) obesity,  GERD,      Musculoskeletal     (+) back pain,   Abdominal  - normal exam  (+) obese,     Bowel sounds: normal.   Substance History - negative use     OB/GYN negative ob/gyn ROS         Other                        Anesthesia Plan    ASA 2     general     intravenous induction   Anesthetic plan, all risks, benefits, and alternatives have been provided, discussed and informed consent has been obtained with: patient.    Plan discussed with CRNA.

## 2019-04-03 NOTE — OP NOTE
Operative Note    Preop Dx Menorrhagia, endometrial polyp  PostOp Dx Same    Procedure Hscope, myosure polypectomy, D&C, Novasure ablation    Surgeon Julia    Anesthesia GETA    EBL 30 cc    Path Myosure polyp, Endometrial curetting    Procedure    The patient was taken to the OR and placed in the supine position and give general anesthesia. She was placed in the dorso lithotomy position and prep and drape in nl fashion. The speculum was placed and single tooth tenaculum placed on the anterior lip of the cervix. The cervix was dilated to #18 [place]. Hysteroscope was placed in the cavity and a large polyp was noted on the left side. Myosure was placed and polyp was shaved. D&C was then performed.   The cavity was sounded to 9 cm and Novasure was placed. Cavity assessment passed and Novasure cycle started. Novasure shutted off at 40 sec with error message.  The Novasure was removed and damage to the array was not noted (as the message ask to check). It was decided to abandon the procedure.  The speculum was removed from the vagina and the patient awaken from anaesthesia and transfer to recovery.

## 2019-04-04 LAB
CYTO UR: NORMAL
LAB AP CASE REPORT: NORMAL
PATH REPORT.FINAL DX SPEC: NORMAL
PATH REPORT.GROSS SPEC: NORMAL

## 2019-06-12 ENCOUNTER — TELEPHONE (OUTPATIENT)
Dept: FAMILY MEDICINE CLINIC | Facility: CLINIC | Age: 44
End: 2019-06-12

## 2019-06-12 RX ORDER — CLONAZEPAM 0.5 MG/1
0.5 TABLET ORAL 2 TIMES DAILY PRN
Qty: 60 TABLET | Refills: 0 | Status: SHIPPED | OUTPATIENT
Start: 2019-06-12 | End: 2019-08-29 | Stop reason: SDUPTHER

## 2019-06-12 NOTE — TELEPHONE ENCOUNTER
RX IS UP FRONT AND READY TO BE PICKED UP. PT IS AWARE.       ----- Message from Pamela Rodarte sent at 6/11/2019 11:28 AM EDT -----  PT NEEDS A REFILL ON KLOPIN .5MG BID PRN #[id number]    [phone]    THANK YOU

## 2019-06-13 RX ORDER — CLINDAMYCIN HYDROCHLORIDE 150 MG/1
150 CAPSULE ORAL 3 TIMES DAILY
COMMUNITY
End: 2019-08-29

## 2019-06-14 ENCOUNTER — ANESTHESIA EVENT (OUTPATIENT)
Dept: GASTROENTEROLOGY | Facility: HOSPITAL | Age: 44
End: 2019-06-14

## 2019-06-14 ENCOUNTER — HOSPITAL ENCOUNTER (OUTPATIENT)
Facility: HOSPITAL | Age: 44
Setting detail: HOSPITAL OUTPATIENT SURGERY
Discharge: HOME | End: 2019-06-14
Attending: INTERNAL MEDICINE | Admitting: INTERNAL MEDICINE

## 2019-06-14 ENCOUNTER — ANESTHESIA (OUTPATIENT)
Dept: GASTROENTEROLOGY | Facility: HOSPITAL | Age: 44
End: 2019-06-14

## 2019-06-14 VITALS
HEART RATE: 82 BPM | WEIGHT: 208.5 LBS | TEMPERATURE: 98.5 F | OXYGEN SATURATION: 98 % | BODY MASS INDEX: 32.72 KG/M2 | DIASTOLIC BLOOD PRESSURE: 78 MMHG | RESPIRATION RATE: 18 BRPM | HEIGHT: 67 IN | SYSTOLIC BLOOD PRESSURE: 112 MMHG

## 2019-06-14 DIAGNOSIS — R93.5 ABNORMAL CT OF THE ABDOMEN: ICD-10-CM

## 2019-06-14 DIAGNOSIS — K57.92 DIVERTICULITIS: ICD-10-CM

## 2019-06-14 PROCEDURE — 88305 TISSUE EXAM BY PATHOLOGIST: CPT | Performed by: INTERNAL MEDICINE

## 2019-06-14 PROCEDURE — 25010000002 PROPOFOL 10 MG/ML EMULSION: Performed by: ANESTHESIOLOGY

## 2019-06-14 PROCEDURE — 45380 COLONOSCOPY AND BIOPSY: CPT | Performed by: INTERNAL MEDICINE

## 2019-06-14 PROCEDURE — S0260 H&P FOR SURGERY: HCPCS | Performed by: INTERNAL MEDICINE

## 2019-06-14 PROCEDURE — 45385 COLONOSCOPY W/LESION REMOVAL: CPT | Performed by: INTERNAL MEDICINE

## 2019-06-14 PROCEDURE — 81025 URINE PREGNANCY TEST: CPT | Performed by: INTERNAL MEDICINE

## 2019-06-14 DEVICE — DEV CLIP ENDO RESOLUTION360 CONTRL ROT 235CM: Type: IMPLANTABLE DEVICE | Site: SIGMOID COLON | Status: FUNCTIONAL

## 2019-06-14 RX ORDER — PROPOFOL 10 MG/ML
VIAL (ML) INTRAVENOUS CONTINUOUS PRN
Status: DISCONTINUED | OUTPATIENT
Start: 2019-06-14 | End: 2019-06-14 | Stop reason: SURG

## 2019-06-14 RX ORDER — LIDOCAINE HYDROCHLORIDE 20 MG/ML
INJECTION, SOLUTION INFILTRATION; PERINEURAL AS NEEDED
Status: DISCONTINUED | OUTPATIENT
Start: 2019-06-14 | End: 2019-06-14 | Stop reason: SURG

## 2019-06-14 RX ORDER — GLYCOPYRROLATE 0.2 MG/ML
INJECTION INTRAMUSCULAR; INTRAVENOUS AS NEEDED
Status: DISCONTINUED | OUTPATIENT
Start: 2019-06-14 | End: 2019-06-14 | Stop reason: SURG

## 2019-06-14 RX ORDER — SODIUM CHLORIDE, SODIUM LACTATE, POTASSIUM CHLORIDE, CALCIUM CHLORIDE 600; 310; 30; 20 MG/100ML; MG/100ML; MG/100ML; MG/100ML
30 INJECTION, SOLUTION INTRAVENOUS CONTINUOUS
Status: DISCONTINUED | OUTPATIENT
Start: 2019-06-14 | End: 2019-06-14 | Stop reason: HOSPADM

## 2019-06-14 RX ORDER — PROPOFOL 10 MG/ML
VIAL (ML) INTRAVENOUS AS NEEDED
Status: DISCONTINUED | OUTPATIENT
Start: 2019-06-14 | End: 2019-06-14 | Stop reason: SURG

## 2019-06-14 RX ADMIN — ALFENTANIL HYDROCHLORIDE 250 MCG: 500 INJECTION, SOLUTION INTRAVENOUS at 08:13

## 2019-06-14 RX ADMIN — GLYCOPYRROLATE 0.2 MG: 0.2 INJECTION INTRAMUSCULAR; INTRAVENOUS at 08:00

## 2019-06-14 RX ADMIN — LIDOCAINE HYDROCHLORIDE 60 MG: 20 INJECTION, SOLUTION INFILTRATION; PERINEURAL at 08:09

## 2019-06-14 RX ADMIN — PROPOFOL 150 MG: 10 INJECTION, EMULSION INTRAVENOUS at 08:09

## 2019-06-14 RX ADMIN — ALFENTANIL HYDROCHLORIDE 250 MCG: 500 INJECTION, SOLUTION INTRAVENOUS at 08:09

## 2019-06-14 RX ADMIN — PROPOFOL 180 MCG/KG/MIN: 10 INJECTION, EMULSION INTRAVENOUS at 08:09

## 2019-06-14 RX ADMIN — SODIUM CHLORIDE, POTASSIUM CHLORIDE, SODIUM LACTATE AND CALCIUM CHLORIDE 30 ML/HR: 600; 310; 30; 20 INJECTION, SOLUTION INTRAVENOUS at 07:50

## 2019-06-14 NOTE — ANESTHESIA PREPROCEDURE EVALUATION
Anesthesia Evaluation     Patient summary reviewed and Nursing notes reviewed   history of anesthetic complications: PONV  NPO Solid Status: > 8 hours  NPO Liquid Status: > 2 hours           Airway   Mallampati: II  TM distance: >3 FB  Neck ROM: full  Dental - normal exam     Pulmonary - normal exam    breath sounds clear to auscultation  (+) a smoker Former,   Cardiovascular - negative cardio ROS and normal exam    Rhythm: regular  Rate: normal    (-) angina, orthopnea, PND, CHOWDHURY      Neuro/Psych- negative ROS  GI/Hepatic/Renal/Endo    (+) obesity,  GERD,      Musculoskeletal (-) negative ROS    Abdominal    Substance History - negative use     OB/GYN negative ob/gyn ROS         Other - negative ROS                       Anesthesia Plan    ASA 2     MAC     Anesthetic plan, all risks, benefits, and alternatives have been provided, discussed and informed consent has been obtained with: patient.

## 2019-06-14 NOTE — H&P
[facility]  Pre Procedure History & Physical    Chief Complaint: Diverticulitis, diarrhea      HPI: 44 y.o. female with a past medical history noted below who presents for evaluation of diarrhea and rectal bleeding.  Symptoms have been present since December. At that time, she had developed abdominal pain in the LLQ, painful to the touch, severe enough to send her to the ER. She had some diarrhea with the pain.  She had a CT showing diverticulitis.  She was treated with cipro and flagyl.  Pain improved.  However, she persisted with diarrhea since that time.  Diarrhea is occurring daily.  She describes diarrhea stools,   - usually 3 times per day.  Mostly all within an hour of waking up. Stools are liquid.  No associated pain or cramping.  She is ok for the rest of the day.  She is having some blood with wiping, all bright red. There is some tenderness at her anal area.  She feels her hemorrhoids are flaring from all the diarrhea.     No GI malignancies in her family, mother with polyps.      Past Medical History:   Past Medical History:   Diagnosis Date   • Anxiety    • Endometrial polyp    • GERD (gastroesophageal reflux disease)    • Insomnia    • Ovarian cyst    • PONV (postoperative nausea and vomiting)        Family History:  Family History   Problem Relation Age of Onset   • Migraines Mother    • Diverticulitis Mother    • Malig Hyperthermia Neg Hx        Social History:   reports that she quit smoking about 11 years ago. Her smoking use included cigarettes. She has a 7.50 pack-year smoking history. She has quit using smokeless tobacco. She reports that she drinks about 1.2 oz of alcohol per week. She reports that she does not use drugs.    Medications:   Medications Prior to Admission   Medication Sig Dispense Refill Last Dose   • clindamycin (CLEOCIN) 150 MG capsule Take 150 mg by mouth 3 (Three) Times a Day.   6/13/2019 at Unknown time   • clonazePAM (KlonoPIN) 0.5 MG tablet Take 1  "tablet by mouth 2 (Two) Times a Day As Needed for Anxiety. 60 tablet 0 6/13/2019 at Unknown time   • ibuprofen (ADVIL,MOTRIN) 800 MG tablet Take 1 tablet by mouth every 8 (eight) hours as needed for mild pain (1-3) or moderate pain (4-6). (Patient taking differently: Take 800 mg by mouth Every 8 (Eight) Hours As Needed for Mild Pain  or Moderate Pain . PT HOLDING FOR SURGERY) 90 tablet 3 6/11/2019   • norethindrone-ethinyl estradiol (VYFEMLA) 0.4-35 MG-MCG per tablet Take 1 tablet by mouth Daily.   6/13/2019 at Unknown time   • omeprazole (priLOSEC) 40 MG capsule Take 40 mg by mouth Every Night.   Past Week at Unknown time   • zolpidem (AMBIEN) 10 MG tablet Take 1 tablet by mouth Every Night. 30 tablet 5 6/13/2019 at Unknown time       Allergies:  Penicillins    ROS:    Pertinent items are noted in HPI     Objective     Blood pressure 133/74, pulse 84, temperature 98.5 °F (36.9 °C), temperature source Oral, resp. rate 16, height 170.7 cm (67.2\"), weight 94.6 kg (208 lb 8 oz), SpO2 96 %, not currently breastfeeding.    Physical Exam   Constitutional: Pt is oriented to person, place, and time and well-developed, well-nourished, and in no distress.   HENT:   Mouth/Throat: Oropharynx is clear and moist.   Neck: Normal range of motion. Neck supple.   Cardiovascular: Normal rate, regular rhythm and normal heart sounds.    Pulmonary/Chest: Effort normal and breath sounds normal. No respiratory distress. No  wheezes.   Abdominal: Soft. Bowel sounds are normal.   Skin: Skin is warm and dry.   Psychiatric: Mood, memory, affect and judgment normal.     Assessment/Plan     Diagnosis: Diverticulitis, diarrhea      Anticipated Surgical Procedure:    Colonoscopy    The risks, benefits, and alternatives of this procedure have been discussed with the patient or the responsible party- the patient understands and agrees to proceed.  "

## 2019-06-14 NOTE — ANESTHESIA POSTPROCEDURE EVALUATION
"Patient: Radha Barry    Procedure Summary     Date:  06/14/19 Room / Location:  [facility] ENDOSCOPY 9 /  [facility]    Anesthesia Start:  0804 Anesthesia Stop:  0839    Procedure:  COLONOSCOPY TO CECUM AND INTO TI WITH COLD BIOSPIES AND HOT SNARE POLYPECTOMIES WITH RESOLUTION CLIP X1 (N/A ) Diagnosis:       Abnormal CT of the abdomen      Diverticulitis      (Abnormal CT of the abdomen [R93.5])      (Diverticulitis [K57.92])    Surgeon:  Jordana Cruz MD Provider:  Ulises Mcclendon MD    Anesthesia Type:  MAC ASA Status:  2          Anesthesia Type: MAC  Last vitals  BP   133/74 (06/14/19 0726)   Temp   36.9 °C (98.5 °F) (06/14/19 0726)   Pulse   84 (06/14/19 0726)   Resp   16 (06/14/19 0726)     SpO2   96 % (06/14/19 0726)     Post Anesthesia Care and Evaluation    Patient participation: complete - patient participated  Level of consciousness: awake  Pain management: adequate  Airway patency: patent  Anesthetic complications: No anesthetic complications    Cardiovascular status: acceptable  Respiratory status: acceptable  Hydration status: acceptable    Comments: /74 (BP Location: Left arm, Patient Position: Sitting)   Pulse 84   Temp 36.9 °C (98.5 °F) (Oral)   Resp 16   Ht 170.7 cm (67.2\")   Wt 94.6 kg (208 lb 8 oz)   SpO2 96%   BMI 32.46 kg/m²         "

## 2019-06-14 NOTE — DISCHARGE INSTRUCTIONS
For the next 24 hours patient needs to be with a responsible adult.    For 24 hours DO NOT drive, operate machinery, appliances, drink alcohol, make important decisions or sign legal documents.    Start with a light or bland diet if you are feeling sick to your stomach otherwise advance to regular diet as tolerated.    Follow recommendations on procedure report if provided by your doctor.    Call Dr. Cruz for problems [phone]    Problems may include but not limited to: large amounts of bleeding, trouble breathing, repeated vomiting, severe unrelieved pain, fever or chills.    -

## 2019-06-20 ENCOUNTER — TELEPHONE (OUTPATIENT)
Dept: GASTROENTEROLOGY | Facility: CLINIC | Age: 44
End: 2019-06-20

## 2019-06-20 NOTE — PROGRESS NOTES
Please her know that the random colon biopsies were normal.  The large polyp removed was a tubulovillous adenoma.  This is a precancerous polyp.    Her next colonoscopy should be in 3 years, please place in recall    All of her first-degree family members over the age of 32 need to have a screening colonoscopy if they haven't already due to the precancerous nature of this polyp.  Her children will need to have screening colonoscopy starting at age 32 for the same reason.    Office follow up as previously recommended

## 2019-06-20 NOTE — TELEPHONE ENCOUNTER
Called pt and advised per Dr Cruz that her random colon bx were normal.  The large polyp removed was a tuvulovillous adneoma.  This is a precancerous polyp.      She recommends her next c/s in 3 yrs.     All of her first degree family memebers over the age of 32 need to have a screeing c/s if they have not already due to the precancerous nature of this polyp.  Her children will need to have screening c/s at age 32 for the same reason.     Office f/u as previously recommended. Pt verb understanding.    C/s placed in recall for 06/14/2022.

## 2019-06-20 NOTE — TELEPHONE ENCOUNTER
----- Message from Jordana Cruz MD sent at 6/20/2019  1:59 PM EDT -----  Please her know that the random colon biopsies were normal.  The large polyp removed was a tubulovillous adenoma.  This is a precancerous polyp.    Her next colonoscopy should be in 3 years, please place in recall    All of her first-degree family members over the age of 32 need to have a screening colonoscopy if they haven't already due to the precancerous nature of this polyp.  Her children will need to have screening colonoscopy starting at age 32 for the same reason.    Office follow up as previously recommended

## 2019-08-14 RX ORDER — OMEPRAZOLE 40 MG/1
40 CAPSULE, DELAYED RELEASE ORAL NIGHTLY
Qty: 30 CAPSULE | Refills: 0 | Status: SHIPPED | OUTPATIENT
Start: 2019-08-14 | End: 2019-09-16 | Stop reason: SDUPTHER

## 2019-08-29 ENCOUNTER — OFFICE VISIT (OUTPATIENT)
Dept: FAMILY MEDICINE CLINIC | Facility: CLINIC | Age: 44
End: 2019-08-29

## 2019-08-29 VITALS
HEIGHT: 67 IN | WEIGHT: 215.4 LBS | TEMPERATURE: 97.8 F | DIASTOLIC BLOOD PRESSURE: 84 MMHG | BODY MASS INDEX: 33.81 KG/M2 | SYSTOLIC BLOOD PRESSURE: 132 MMHG | RESPIRATION RATE: 16 BRPM

## 2019-08-29 DIAGNOSIS — F41.9 ANXIETY: Primary | ICD-10-CM

## 2019-08-29 DIAGNOSIS — F51.01 PRIMARY INSOMNIA: ICD-10-CM

## 2019-08-29 PROCEDURE — 99213 OFFICE O/P EST LOW 20 MIN: CPT | Performed by: INTERNAL MEDICINE

## 2019-08-29 RX ORDER — ZOLPIDEM TARTRATE 10 MG/1
10 TABLET ORAL NIGHTLY
Qty: 30 TABLET | Refills: 0 | Status: SHIPPED | OUTPATIENT
Start: 2019-08-29 | End: 2020-01-09 | Stop reason: SDUPTHER

## 2019-08-29 RX ORDER — CLONAZEPAM 0.5 MG/1
0.5 TABLET ORAL 2 TIMES DAILY PRN
Qty: 60 TABLET | Refills: 0 | Status: SHIPPED | OUTPATIENT
Start: 2019-08-29 | End: 2020-01-09 | Stop reason: SDUPTHER

## 2019-09-02 NOTE — PROGRESS NOTES
Subjective   Radha Barry is a 44 y.o. female. Patient is here today for   Chief Complaint   Patient presents with   • Insomnia     refill zolpidem   • Anxiety     refill on klonopin          Vitals:    19 1254   BP: 132/84   Resp: 16   Temp: 97.8 °F (36.6 °C)       Past Medical History:   Diagnosis Date   • Anxiety    • Endometrial polyp    • GERD (gastroesophageal reflux disease)    • Insomnia    • Ovarian cyst    • PONV (postoperative nausea and vomiting)       Allergies   Allergen Reactions   • Penicillins Anaphylaxis      Social History     Socioeconomic History   • Marital status:      Spouse name: Not on file   • Number of children: Not on file   • Years of education: Not on file   • Highest education level: Not on file   Tobacco Use   • Smoking status: Former Smoker     Packs/day: 0.50     Years: 15.00     Pack years: 7.50     Types: Cigarettes     Last attempt to quit: 2008     Years since quittin.6   • Smokeless tobacco: Former User   Substance and Sexual Activity   • Alcohol use: Yes     Alcohol/week: 1.2 oz     Types: 2 Standard drinks or equivalent per week     Comment: SOCIAL, weekends   • Drug use: No   • Sexual activity: Defer        Current Outpatient Medications:   •  clonazePAM (KlonoPIN) 0.5 MG tablet, Take 1 tablet by mouth 2 (Two) Times a Day As Needed for Anxiety., Disp: 60 tablet, Rfl: 0  •  ibuprofen (ADVIL,MOTRIN) 800 MG tablet, Take 1 tablet by mouth every 8 (eight) hours as needed for mild pain (1-3) or moderate pain (4-6). (Patient taking differently: Take 800 mg by mouth Every 8 (Eight) Hours As Needed for Mild Pain  or Moderate Pain . PT HOLDING FOR SURGERY), Disp: 90 tablet, Rfl: 3  •  norethindrone-ethinyl estradiol (VYFEMLA) 0.4-35 MG-MCG per tablet, Take 1 tablet by mouth Daily., Disp: , Rfl:   •  omeprazole (priLOSEC) 40 MG capsule, Take 1 capsule by mouth Every Night., Disp: 30 capsule, Rfl: 0  •  zolpidem (AMBIEN) 10 MG tablet, Take 1 tablet by mouth  Every Night., Disp: 30 tablet, Rfl: 0     Objective     She has chronic sleep onset insomnia and situational anxiety.  She is here today requesting a refill on Ambien and clonazepam.  She is currently in the process of weaning herself from her chronic clonazepam use over the last several years.  She tells me she is having success with this.             Review of Systems   Constitutional: Negative.    HENT: Negative.    Respiratory: Negative.    Cardiovascular: Negative.    Musculoskeletal: Negative.    Psychiatric/Behavioral: Negative.        Physical Exam   Constitutional: She is oriented to person, place, and time. She appears well-developed and well-nourished.   Pleasant, neatly groomed, BMI 33.   HENT:   Head: Normocephalic and atraumatic.   Cardiovascular: Normal rate, regular rhythm and normal heart sounds.   Pulmonary/Chest: Effort normal and breath sounds normal.   Neurological: She is alert and oriented to person, place, and time.   Psychiatric: She has a normal mood and affect. Her behavior is normal. Thought content normal.   Nursing note and vitals reviewed.        Problem List Items Addressed This Visit        Other    Anxiety - Primary    Primary insomnia            PLAN  I refilled her Ambien and clonazepam.  I encouraged her to taper and discontinue both of these medications.    She should follow-up for a comprehensive physical examination once yearly.  No Follow-up on file.

## 2019-09-16 RX ORDER — OMEPRAZOLE 40 MG/1
40 CAPSULE, DELAYED RELEASE ORAL NIGHTLY
Qty: 30 CAPSULE | Refills: 5 | Status: SHIPPED | OUTPATIENT
Start: 2019-09-16 | End: 2020-03-23

## 2019-10-23 ENCOUNTER — LAB REQUISITION (OUTPATIENT)
Dept: LAB | Facility: HOSPITAL | Age: 44
End: 2019-10-23

## 2019-10-23 DIAGNOSIS — Z00.00 ENCOUNTER FOR GENERAL ADULT MEDICAL EXAMINATION WITHOUT ABNORMAL FINDINGS: ICD-10-CM

## 2019-10-23 LAB
ALBUMIN SERPL-MCNC: 4.1 G/DL (ref 3.5–5.2)
ALBUMIN/GLOB SERPL: 1.6 G/DL
ALP SERPL-CCNC: 42 U/L (ref 39–117)
ALT SERPL W P-5'-P-CCNC: 29 U/L (ref 1–33)
ANION GAP SERPL CALCULATED.3IONS-SCNC: 13.5 MMOL/L (ref 5–15)
AST SERPL-CCNC: 39 U/L (ref 1–32)
BILIRUB SERPL-MCNC: 0.3 MG/DL (ref 0.2–1.2)
BUN BLD-MCNC: 8 MG/DL (ref 6–20)
BUN/CREAT SERPL: 12.1 (ref 7–25)
CALCIUM SPEC-SCNC: 7.4 MG/DL (ref 8.6–10.5)
CHLORIDE SERPL-SCNC: 104 MMOL/L (ref 98–107)
CO2 SERPL-SCNC: 20.5 MMOL/L (ref 22–29)
CREAT BLD-MCNC: 0.66 MG/DL (ref 0.57–1)
GFR SERPL CREATININE-BSD FRML MDRD: 98 ML/MIN/1.73
GLOBULIN UR ELPH-MCNC: 2.5 GM/DL
GLUCOSE BLD-MCNC: 95 MG/DL (ref 65–99)
HCT VFR BLD AUTO: 31.6 % (ref 34–46.6)
HGB BLD-MCNC: 9.9 G/DL (ref 12–15.9)
POTASSIUM BLD-SCNC: 3.8 MMOL/L (ref 3.5–5.2)
PROT SERPL-MCNC: 6.6 G/DL (ref 6–8.5)
SODIUM BLD-SCNC: 138 MMOL/L (ref 136–145)

## 2019-10-23 PROCEDURE — 88305 TISSUE EXAM BY PATHOLOGIST: CPT | Performed by: OBSTETRICS & GYNECOLOGY

## 2019-10-23 PROCEDURE — 80053 COMPREHEN METABOLIC PANEL: CPT | Performed by: OBSTETRICS & GYNECOLOGY

## 2019-10-23 PROCEDURE — 85014 HEMATOCRIT: CPT | Performed by: OBSTETRICS & GYNECOLOGY

## 2019-10-23 PROCEDURE — 85018 HEMOGLOBIN: CPT | Performed by: OBSTETRICS & GYNECOLOGY

## 2019-10-24 LAB
CYTO UR: NORMAL
LAB AP CASE REPORT: NORMAL
LAB AP CLINICAL INFORMATION: NORMAL
PATH REPORT.FINAL DX SPEC: NORMAL
PATH REPORT.GROSS SPEC: NORMAL

## 2020-01-07 NOTE — TELEPHONE ENCOUNTER
PT CALLED IN FOR SCRIPT REFILL FOR     clonazePAM (KlonoPIN) 0.5 MG Take 1 tablet by mouth 2 (Two) Times a Day As Needed for Anxiety #60    zolpidem (AMBIEN) 10 MG Take 1 tablet by mouth Every Night #30    PLEASE SEND TO [facility] IN [place] [address].    PLEASE CONTACT PT WHEN READY FOR  [phone]

## 2020-01-10 RX ORDER — CLONAZEPAM 0.5 MG/1
0.5 TABLET ORAL 2 TIMES DAILY PRN
Qty: 60 TABLET | Refills: 0 | Status: SHIPPED | OUTPATIENT
Start: 2020-01-10 | End: 2020-02-18

## 2020-01-10 RX ORDER — CLONAZEPAM 0.5 MG/1
TABLET ORAL
Qty: 60 TABLET | Refills: 1 | OUTPATIENT
Start: 2020-01-10

## 2020-01-10 RX ORDER — ZOLPIDEM TARTRATE 10 MG/1
10 TABLET ORAL NIGHTLY
Qty: 30 TABLET | Refills: 0 | Status: SHIPPED | OUTPATIENT
Start: 2020-01-10 | End: 2020-02-18

## 2020-02-18 RX ORDER — ZOLPIDEM TARTRATE 10 MG/1
10 TABLET ORAL NIGHTLY
Qty: 30 TABLET | Refills: 2 | Status: SHIPPED | OUTPATIENT
Start: 2020-02-18 | End: 2020-04-27

## 2020-02-18 RX ORDER — CLONAZEPAM 0.5 MG/1
TABLET ORAL
Qty: 60 TABLET | Refills: 1 | Status: SHIPPED | OUTPATIENT
Start: 2020-02-18 | End: 2020-04-27

## 2020-03-23 RX ORDER — OMEPRAZOLE 40 MG/1
40 CAPSULE, DELAYED RELEASE ORAL NIGHTLY
Qty: 30 CAPSULE | Refills: 5 | Status: SHIPPED | OUTPATIENT
Start: 2020-03-23 | End: 2020-10-05

## 2020-04-27 RX ORDER — CLONAZEPAM 0.5 MG/1
TABLET ORAL
Qty: 60 TABLET | Refills: 0 | Status: SHIPPED | OUTPATIENT
Start: 2020-04-27 | End: 2020-05-26

## 2020-04-27 RX ORDER — ZOLPIDEM TARTRATE 10 MG/1
10 TABLET ORAL NIGHTLY
Qty: 30 TABLET | Refills: 0 | Status: SHIPPED | OUTPATIENT
Start: 2020-04-27 | End: 2020-06-17 | Stop reason: SDUPTHER

## 2020-05-26 RX ORDER — CLONAZEPAM 0.5 MG/1
TABLET ORAL
Qty: 60 TABLET | Refills: 1 | Status: SHIPPED | OUTPATIENT
Start: 2020-05-26 | End: 2020-07-22

## 2020-06-17 NOTE — TELEPHONE ENCOUNTER
Patient is calling to check the status of the refill of the following:    zolpidem (AMBIEN) 10 MG tablet     [id number] - [place], [place] - [address] AT [facility] & [address] - [phone]  - [phone] FX  [phone] confirmed

## 2020-06-18 RX ORDER — ZOLPIDEM TARTRATE 10 MG/1
10 TABLET ORAL NIGHTLY
Qty: 30 TABLET | OUTPATIENT
Start: 2020-06-18

## 2020-06-18 RX ORDER — ZOLPIDEM TARTRATE 10 MG/1
10 TABLET ORAL NIGHTLY
Qty: 30 TABLET | Refills: 0 | Status: SHIPPED | OUTPATIENT
Start: 2020-06-18 | End: 2020-07-11 | Stop reason: SDUPTHER

## 2020-07-13 RX ORDER — ZOLPIDEM TARTRATE 10 MG/1
10 TABLET ORAL NIGHTLY
Qty: 30 TABLET | Refills: 0 | Status: SHIPPED | OUTPATIENT
Start: 2020-07-13 | End: 2020-08-13 | Stop reason: SDUPTHER

## 2020-07-21 ENCOUNTER — TELEPHONE (OUTPATIENT)
Dept: FAMILY MEDICINE CLINIC | Facility: CLINIC | Age: 45
End: 2020-07-21

## 2020-07-21 RX ORDER — CLONAZEPAM 0.5 MG/1
0.5 TABLET ORAL 2 TIMES DAILY PRN
Qty: 60 TABLET | Refills: 1 | Status: CANCELLED | OUTPATIENT
Start: 2020-07-21

## 2020-07-22 RX ORDER — CLONAZEPAM 0.5 MG/1
TABLET ORAL
Qty: 60 TABLET | Refills: 1 | Status: SHIPPED | OUTPATIENT
Start: 2020-07-22 | End: 2020-09-16 | Stop reason: SDUPTHER

## 2020-07-22 NOTE — TELEPHONE ENCOUNTER
Katina with [facility] called the script for Clonazepam has no directions on how to use for the patient. Please advise.    Best call [phone](Katina)

## 2020-08-13 ENCOUNTER — TELEPHONE (OUTPATIENT)
Dept: FAMILY MEDICINE CLINIC | Facility: CLINIC | Age: 45
End: 2020-08-13

## 2020-08-13 RX ORDER — ZOLPIDEM TARTRATE 10 MG/1
10 TABLET ORAL NIGHTLY
Qty: 30 TABLET | Refills: 0 | OUTPATIENT
Start: 2020-08-13

## 2020-08-13 RX ORDER — ZOLPIDEM TARTRATE 10 MG/1
10 TABLET ORAL NIGHTLY PRN
Qty: 30 TABLET | Refills: 0 | Status: SHIPPED | OUTPATIENT
Start: 2020-08-13 | End: 2020-09-16 | Stop reason: SDUPTHER

## 2020-08-13 NOTE — TELEPHONE ENCOUNTER
PATIENT CALLED  REQUESTING HER MEDICATION    zolpidem (AMBIEN) 10 MG tablet    SHE REQUESTED THIS VIA MY CHART AND THE PHARMACY HAS REQUESTED AS WELL. SHE CHECKED WITH PHARMACY AT 10:30 AND IT IS NOT THERE    SHE IS LEAVING TOMORROW MORNING GOING OUT OF [place]OWN FOR [place] TOURNAMENT.  PATIENT CALL BACK NUMBER [phone]

## 2020-09-10 ENCOUNTER — PATIENT MESSAGE (OUTPATIENT)
Dept: FAMILY MEDICINE CLINIC | Facility: CLINIC | Age: 45
End: 2020-09-10

## 2020-09-10 RX ORDER — ZOLPIDEM TARTRATE 10 MG/1
10 TABLET ORAL NIGHTLY PRN
Qty: 30 TABLET | Refills: 0 | OUTPATIENT
Start: 2020-09-10

## 2020-09-10 RX ORDER — ZOLPIDEM TARTRATE 10 MG/1
TABLET ORAL
Qty: 30 TABLET | OUTPATIENT
Start: 2020-09-10

## 2020-09-10 NOTE — TELEPHONE ENCOUNTER
Caller: Radha Barry    Relationship: Self    Best call back number: [phone]    Medication needed:   Requested Prescriptions     Pending Prescriptions Disp Refills   • zolpidem (AMBIEN) 10 MG tablet 30 tablet 0     Sig: Take 1 tablet by mouth At Night As Needed for Sleep. prn       When do you need the refill by: ASAP    What details did the patient provide when requesting the medication: PT HAS 1 PILL LEFT, PT HAS APPT SCHEDULED     Does the patient have less than a 3 day supply:  [x] Yes  [] No    What is the patient's preferred pharmacy: [id number] - [place] [address] AT [facility] & [address] - [phone]  - [phone] FX

## 2020-09-10 NOTE — TELEPHONE ENCOUNTER
PT HAS LAB APPOINTMENT SCHEDULED FOR 9/14/20 AND A FU SCHEDULED WITH  ON 9/23/20.  PT IS ON HER LAST PILL OF zolpidem (AMBIEN) 10 MG    PT REQUESTING A REFILL WITH AT LEAST ENOUGH TO GET PT TO HER APPT WITH  ON 9/23/20.    PT NEEDS SCRIPT FOR     zolpidem (AMBIEN) 10 MG Take 1 tablet by mouth At Night As Needed for Sleep. Prn #30    PLEASE SEND TO [facility] [address] IN [place].    IF YOU HAVE ANY QUESTIONS PLEASE CONTACT PT [phone]

## 2020-09-10 NOTE — TELEPHONE ENCOUNTER
Pt has appointment scheduled on 9/14/2020. She is on her last pill. Can she have enough to just get her until her appointment?

## 2020-09-11 ENCOUNTER — TELEPHONE (OUTPATIENT)
Dept: FAMILY MEDICINE CLINIC | Facility: CLINIC | Age: 45
End: 2020-09-11

## 2020-09-11 DIAGNOSIS — Z13.228 ENCOUNTER FOR SCREENING FOR METABOLIC DISORDER: ICD-10-CM

## 2020-09-11 DIAGNOSIS — Z13.220 ENCOUNTER FOR SCREENING FOR LIPID DISORDER: ICD-10-CM

## 2020-09-11 DIAGNOSIS — Z13.89 ENCOUNTER FOR SCREENING FOR OTHER DISORDER: Primary | ICD-10-CM

## 2020-09-11 NOTE — TELEPHONE ENCOUNTER
----- Message from Yadira Golden MA sent at 9/11/2020  7:51 AM EDT -----  Regarding: FW: Prescription Question  Contact: [phone]  Marva patel, would you be able to get pt in any sooner so we can get her medications for her?  is refusing until she is seen.    Thank you,  Yadira Golden, [facility]  ----- Message -----  From: Radha Barry  Sent: 9/10/2020  12:41 PM EDT  [facility]  Subject: Prescription Question                            I scheduled my lab's on Monday and visit on the 23rd.  I have one Ambien left.  Can I get a prescription to last me until the visit on the 23rd.  I can't get into the office sooner than Monday morning for my labs and the next appt for virtual is the 23rd.    I talked to the [facility] last month and called to make an appointment for labs but the wait was too long and I did request a call back (you can request that during the recording) but never received one.  And then got busy with [employer] for school among other things.  So I no officially have an appointment.  Please let me know about the prescription as it was declined again.  Not sure if it was declined before or after I called for my appointments.  Thanks!!

## 2020-09-11 NOTE — TELEPHONE ENCOUNTER
CALLED PT AND WAS ABLE TO MOVE PT'S OFFICE VISIT FROM 9/23/20 TO 9/16/20.    PT IS COMPLETELY OUT OF HER SLEEPING MEDICATION AMBIEN AND IS REQUESTING THE DR SEND OVER AN EMERGENCY 5 DAY SUPPLY TO GIVE HER ENOUGH TO GET HER TO HER APPOINTMENT.     DR IS OUT OF OFFICE ON 9/11/20 AND 9/14/20, THIS WAS THE EARLIEST APPOINTMENT OPTION.      PLEASE CONTACT PT TO ADVISE [phone]

## 2020-09-14 RX ORDER — ZOLPIDEM TARTRATE 10 MG/1
10 TABLET ORAL NIGHTLY PRN
Qty: 30 TABLET | Refills: 0 | OUTPATIENT
Start: 2020-09-14

## 2020-09-15 LAB
ALBUMIN SERPL-MCNC: 4.4 G/DL (ref 3.5–5.2)
ALBUMIN/GLOB SERPL: 2.2 G/DL
ALP SERPL-CCNC: 47 U/L (ref 39–117)
ALT SERPL-CCNC: 21 U/L (ref 1–33)
APPEARANCE UR: CLEAR
AST SERPL-CCNC: 25 U/L (ref 1–32)
BACTERIA #/AREA URNS HPF: ABNORMAL /HPF
BASOPHILS # BLD AUTO: 0.02 10*3/MM3 (ref 0–0.2)
BASOPHILS NFR BLD AUTO: 0.3 % (ref 0–1.5)
BILIRUB SERPL-MCNC: 0.4 MG/DL (ref 0–1.2)
BILIRUB UR QL STRIP: NEGATIVE
BUN SERPL-MCNC: 9 MG/DL (ref 6–20)
BUN/CREAT SERPL: 12.3 (ref 7–25)
CALCIUM SERPL-MCNC: 8.7 MG/DL (ref 8.6–10.5)
CASTS URNS MICRO: ABNORMAL
CHLORIDE SERPL-SCNC: 102 MMOL/L (ref 98–107)
CHOLEST SERPL-MCNC: 184 MG/DL (ref 0–200)
CO2 SERPL-SCNC: 27.7 MMOL/L (ref 22–29)
COLOR UR: YELLOW
CREAT SERPL-MCNC: 0.73 MG/DL (ref 0.57–1)
EOSINOPHIL # BLD AUTO: 0.07 10*3/MM3 (ref 0–0.4)
EOSINOPHIL NFR BLD AUTO: 1.2 % (ref 0.3–6.2)
EPI CELLS #/AREA URNS HPF: ABNORMAL /HPF
ERYTHROCYTE [DISTWIDTH] IN BLOOD BY AUTOMATED COUNT: 16 % (ref 12.3–15.4)
GLOBULIN SER CALC-MCNC: 2 GM/DL
GLUCOSE SERPL-MCNC: 91 MG/DL (ref 65–99)
GLUCOSE UR QL: NEGATIVE
HCT VFR BLD AUTO: 34.4 % (ref 34–46.6)
HDLC SERPL-MCNC: 43 MG/DL (ref 40–60)
HGB BLD-MCNC: 11.2 G/DL (ref 12–15.9)
HGB UR QL STRIP: NEGATIVE
IMM GRANULOCYTES # BLD AUTO: 0.02 10*3/MM3 (ref 0–0.05)
IMM GRANULOCYTES NFR BLD AUTO: 0.3 % (ref 0–0.5)
KETONES UR QL STRIP: ABNORMAL
LDLC SERPL CALC-MCNC: 98 MG/DL (ref 0–100)
LDLC/HDLC SERPL: 2.28 {RATIO}
LEUKOCYTE ESTERASE UR QL STRIP: ABNORMAL
LYMPHOCYTES # BLD AUTO: 1.9 10*3/MM3 (ref 0.7–3.1)
LYMPHOCYTES NFR BLD AUTO: 33.2 % (ref 19.6–45.3)
MCH RBC QN AUTO: 26.3 PG (ref 26.6–33)
MCHC RBC AUTO-ENTMCNC: 32.6 G/DL (ref 31.5–35.7)
MCV RBC AUTO: 80.8 FL (ref 79–97)
MONOCYTES # BLD AUTO: 0.53 10*3/MM3 (ref 0.1–0.9)
MONOCYTES NFR BLD AUTO: 9.2 % (ref 5–12)
NEUTROPHILS # BLD AUTO: 3.19 10*3/MM3 (ref 1.7–7)
NEUTROPHILS NFR BLD AUTO: 55.8 % (ref 42.7–76)
NITRITE UR QL STRIP: NEGATIVE
NRBC BLD AUTO-RTO: 0 /100 WBC (ref 0–0.2)
PH UR STRIP: 6 [PH] (ref 5–8)
PLATELET # BLD AUTO: 276 10*3/MM3 (ref 140–450)
POTASSIUM SERPL-SCNC: 3.6 MMOL/L (ref 3.5–5.2)
PROT SERPL-MCNC: 6.4 G/DL (ref 6–8.5)
PROT UR QL STRIP: ABNORMAL
RBC # BLD AUTO: 4.26 10*6/MM3 (ref 3.77–5.28)
RBC #/AREA URNS HPF: ABNORMAL /HPF
SODIUM SERPL-SCNC: 141 MMOL/L (ref 136–145)
SP GR UR: 1.02 (ref 1–1.03)
TRIGL SERPL-MCNC: 215 MG/DL (ref 0–150)
TSH SERPL DL<=0.005 MIU/L-ACNC: 1.84 UIU/ML (ref 0.27–4.2)
UROBILINOGEN UR STRIP-MCNC: ABNORMAL MG/DL
VLDLC SERPL CALC-MCNC: 43 MG/DL
WBC # BLD AUTO: 5.73 10*3/MM3 (ref 3.4–10.8)
WBC #/AREA URNS HPF: ABNORMAL /HPF

## 2020-09-16 ENCOUNTER — OFFICE VISIT (OUTPATIENT)
Dept: FAMILY MEDICINE CLINIC | Facility: CLINIC | Age: 45
End: 2020-09-16

## 2020-09-16 ENCOUNTER — TELEPHONE (OUTPATIENT)
Dept: FAMILY MEDICINE CLINIC | Facility: CLINIC | Age: 45
End: 2020-09-16

## 2020-09-16 DIAGNOSIS — F51.01 PRIMARY INSOMNIA: ICD-10-CM

## 2020-09-16 DIAGNOSIS — F41.9 ANXIETY: Primary | ICD-10-CM

## 2020-09-16 PROCEDURE — 99442 PR PHYS/QHP TELEPHONE EVALUATION 11-20 MIN: CPT | Performed by: INTERNAL MEDICINE

## 2020-09-16 RX ORDER — ZOLPIDEM TARTRATE 10 MG/1
10 TABLET ORAL NIGHTLY PRN
Qty: 30 TABLET | Refills: 3 | Status: SHIPPED | OUTPATIENT
Start: 2020-09-16 | End: 2021-01-06 | Stop reason: SDUPTHER

## 2020-09-16 RX ORDER — CLONAZEPAM 0.5 MG/1
TABLET ORAL
Qty: 60 TABLET | Refills: 1 | Status: CANCELLED | OUTPATIENT
Start: 2020-09-16

## 2020-09-16 RX ORDER — CLONAZEPAM 0.5 MG/1
TABLET ORAL
Qty: 60 TABLET | Refills: 3 | Status: SHIPPED | OUTPATIENT
Start: 2020-09-16 | End: 2020-09-18

## 2020-09-16 RX ORDER — ZOLPIDEM TARTRATE 10 MG/1
10 TABLET ORAL NIGHTLY PRN
Qty: 30 TABLET | Refills: 0 | Status: CANCELLED | OUTPATIENT
Start: 2020-09-16

## 2020-09-16 NOTE — PROGRESS NOTES
Subjective   Radha Barry is a 45 y.o. female.     This is documentation for a telephone visit with this patient today.  The patient agreed to the visit and committed to the visit.    Is been more than a year since I saw this patient last.  He takes clonazepam as needed for situational anxiety.  She tells me that she has been trying to taper herself off this (over a year.    You have been having 10 mg nightly and she is done quite a long time.    She and I had discussed in the past tapering and discontinuing these medications.    Nevertheless she finds these medications useful.          Review of Systems   Constitutional: Negative.    HENT: Negative.    Respiratory: Negative.    Cardiovascular: Negative.    Psychiatric/Behavioral: Positive for sleep disturbance. The patient is nervous/anxious.        Objective   Physical Exam  A physical examination was not done.  There is little cognitive.  Assessment/Plan   Diagnoses and all orders for this visit:    Anxiety    Primary insomnia    Other orders  -     clonazePAM (KlonoPIN) 0.5 MG tablet; for anxiety, follow up at least twice yearly  -     zolpidem (AMBIEN) 10 MG tablet; Take 1 tablet by mouth At Night As Needed for Sleep. prn         She has situational anxiety.  She is trying to taper and has an idea towards discontinuing her clonazepam.  Nevertheless, she continues to use this medication and she requested a belt which I provided for her today.    She has primary insomnia.  She finds Ambien to be useful I wrote that prescription for her today.    I gave her a refill on both medications up to 4 months.    I told her that she must follow-up while taking this medication twice a year at least.  I prefer that this be done in person.     Time spent on this telephone visit was greater than 11 minutes.

## 2020-09-16 NOTE — TELEPHONE ENCOUNTER
Caller: BauckbenjaRadha    Relationship: Self    Best call back [phone]     Medication needed:   Requested Prescriptions     Pending Prescriptions Disp Refills   • clonazePAM (KlonoPIN) 0.5 MG tablet 60 tablet 1     Sig: for anxiety, follow up at least twice yearly   • zolpidem (AMBIEN) 10 MG tablet 30 tablet 0     Sig: Take 1 tablet by mouth At Night As Needed for Sleep. prn       When do you need the refill by TODAY     What details did the patient provide when requesting the medication: PATIENT STATES HAD A TELEPHONE VISIT THIS MORNING BUT HER MEDICINES HAVE NOT BEEN CALLED IN TO PHARMACY.  SHE ONLY NEEDS THE AMBIEN FILLED AT THIS TIME.    Does the patient have less than a 3 day supply:  [x] Yes  [] No    What is the patient's preferred pharmacy: [facility] #[id number] - [place], [place] - [address] AT [facility] & [address] - [phone]  - [phone] FX

## 2020-09-17 RX ORDER — CLONAZEPAM 0.5 MG/1
TABLET ORAL
Qty: 60 TABLET | Refills: 3 | OUTPATIENT
Start: 2020-09-17

## 2020-09-18 RX ORDER — CLONAZEPAM 0.5 MG/1
TABLET ORAL
Qty: 60 TABLET | Refills: 3 | Status: SHIPPED | OUTPATIENT
Start: 2020-09-18 | End: 2021-01-06 | Stop reason: SDUPTHER

## 2020-09-21 ENCOUNTER — PATIENT MESSAGE (OUTPATIENT)
Dept: FAMILY MEDICINE CLINIC | Facility: CLINIC | Age: 45
End: 2020-09-21

## 2020-10-05 RX ORDER — OMEPRAZOLE 40 MG/1
40 CAPSULE, DELAYED RELEASE ORAL NIGHTLY
Qty: 30 CAPSULE | Refills: 5 | Status: SHIPPED | OUTPATIENT
Start: 2020-10-05 | End: 2021-04-05

## 2021-01-06 ENCOUNTER — OFFICE VISIT (OUTPATIENT)
Dept: FAMILY MEDICINE CLINIC | Facility: CLINIC | Age: 46
End: 2021-01-06

## 2021-01-06 VITALS
WEIGHT: 227.4 LBS | TEMPERATURE: 97.3 F | HEART RATE: 78 BPM | SYSTOLIC BLOOD PRESSURE: 134 MMHG | DIASTOLIC BLOOD PRESSURE: 80 MMHG | HEIGHT: 67 IN | OXYGEN SATURATION: 100 % | BODY MASS INDEX: 35.69 KG/M2 | RESPIRATION RATE: 18 BRPM

## 2021-01-06 DIAGNOSIS — E66.9 OBESITY (BMI 35.0-39.9 WITHOUT COMORBIDITY): ICD-10-CM

## 2021-01-06 DIAGNOSIS — F41.9 ANXIETY: Primary | ICD-10-CM

## 2021-01-06 DIAGNOSIS — F51.01 PRIMARY INSOMNIA: ICD-10-CM

## 2021-01-06 PROCEDURE — 99214 OFFICE O/P EST MOD 30 MIN: CPT | Performed by: INTERNAL MEDICINE

## 2021-01-06 RX ORDER — CLONAZEPAM 0.5 MG/1
TABLET ORAL
Qty: 60 TABLET | Refills: 3 | Status: SHIPPED | OUTPATIENT
Start: 2021-01-06 | End: 2021-04-29 | Stop reason: SDUPTHER

## 2021-01-06 RX ORDER — ZOLPIDEM TARTRATE 10 MG/1
10 TABLET ORAL NIGHTLY PRN
Qty: 30 TABLET | Refills: 3 | Status: SHIPPED | OUTPATIENT
Start: 2021-01-06 | End: 2021-04-29 | Stop reason: SDUPTHER

## 2021-01-24 PROBLEM — E66.9 OBESITY (BMI 35.0-39.9 WITHOUT COMORBIDITY): Status: ACTIVE | Noted: 2021-01-24

## 2021-01-24 NOTE — PROGRESS NOTES
Subjective   Radha Barry is a 45 y.o. female. Patient is here today for   Chief Complaint   Patient presents with   • Anxiety     medication management.    • Insomnia          Vitals:    21 1110   BP: 134/80   Pulse: 78   Resp: 18   Temp: 97.3 °F (36.3 °C)   SpO2: 100%     Body mass index is 35.4 kg/m².      Past Medical History:   Diagnosis Date   • Anxiety    • Endometrial polyp    • GERD (gastroesophageal reflux disease)    • Insomnia    • Ovarian cyst    • PONV (postoperative nausea and vomiting)       Allergies   Allergen Reactions   • Penicillins Anaphylaxis      Social History     Socioeconomic History   • Marital status:      Spouse name: Not on file   • Number of children: Not on file   • Years of education: Not on file   • Highest education level: Not on file   Tobacco Use   • Smoking status: Former Smoker     Packs/day: 0.50     Years: 15.00     Pack years: 7.50     Types: Cigarettes     Quit date: 2008     Years since quittin.0   • Smokeless tobacco: Former User   Substance and Sexual Activity   • Alcohol use: Yes     Alcohol/week: 2.0 standard drinks     Types: 2 Standard drinks or equivalent per week     Comment: SOCIAL, weekends   • Drug use: No   • Sexual activity: Defer        Current Outpatient Medications:   •  clonazePAM (KlonoPIN) 0.5 MG tablet, 1 o.o. bid prn for anxiety, follow up at least twice yearly, Disp: 60 tablet, Rfl: 3  •  omeprazole (priLOSEC) 40 MG capsule, TAKE 1 CAPSULE BY MOUTH EVERY NIGHT, Disp: 30 capsule, Rfl: 5  •  zolpidem (AMBIEN) 10 MG tablet, Take 1 tablet by mouth At Night As Needed for Sleep. prn, Disp: 30 tablet, Rfl: 3     Objective     This patient for the purpose of receiving 2 prescriptions that she takes most days: Clonazepam and zolpidem.    She feels that her situational anxiety is relatively well controlled.    She feels that her sleep onset insomnia is well controlled.    She has no complaints.    Anxiety  Symptoms include insomnia.        Insomnia         Review of Systems   Constitutional: Negative.    HENT: Negative.    Respiratory: Negative.    Cardiovascular: Negative.    Musculoskeletal: Negative.    Psychiatric/Behavioral: The patient has insomnia.        Physical Exam  Vitals signs and nursing note reviewed.   Constitutional:       Appearance: Normal appearance. She is obese. She is not ill-appearing or diaphoretic.      Comments: Pleasant, neatly groomed, in no distress.   Cardiovascular:      Heart sounds: Normal heart sounds. No murmur. No gallop.    Pulmonary:      Effort: No respiratory distress.      Breath sounds: Normal breath sounds. No wheezing or rales.   Neurological:      Mental Status: She is alert and oriented to person, place, and time.   Psychiatric:         Mood and Affect: Mood normal.         Behavior: Behavior normal.         Thought Content: Thought content normal.           Problems Addressed this Visit        Endocrine and Metabolic    Obesity (BMI 35.0-39.9 without comorbidity)       Mental Health    Anxiety - Primary       Sleep    Primary insomnia      Diagnoses       Codes Comments    Anxiety    -  Primary ICD-10-CM: F41.9  ICD-9-CM: 300.00     Primary insomnia     ICD-10-CM: F51.01  ICD-9-CM: 307.42     Obesity (BMI 35.0-39.9 without comorbidity)     ICD-10-CM: E66.9  ICD-9-CM: 278.00             PLAN  I refilled her Ambien and clonazepam today.    She and I did discuss at length my help that she would taper and discontinue these medications over time.    She is obese and she and I discussed the importance of weight loss via regular aerobic activity and decrease caloric intake.    She should for a comprehensive exam at her convenience.  No follow-ups on file.

## 2021-04-05 RX ORDER — OMEPRAZOLE 40 MG/1
40 CAPSULE, DELAYED RELEASE ORAL NIGHTLY
Qty: 30 CAPSULE | Refills: 5 | Status: SHIPPED | OUTPATIENT
Start: 2021-04-05 | End: 2021-10-20

## 2021-04-28 ENCOUNTER — OFFICE VISIT (OUTPATIENT)
Dept: FAMILY MEDICINE CLINIC | Facility: CLINIC | Age: 46
End: 2021-04-28

## 2021-04-28 VITALS
SYSTOLIC BLOOD PRESSURE: 142 MMHG | WEIGHT: 219.4 LBS | HEART RATE: 90 BPM | HEIGHT: 67 IN | OXYGEN SATURATION: 98 % | DIASTOLIC BLOOD PRESSURE: 80 MMHG | RESPIRATION RATE: 18 BRPM | TEMPERATURE: 97.7 F | BODY MASS INDEX: 34.44 KG/M2

## 2021-04-28 DIAGNOSIS — F41.9 ANXIETY: Primary | ICD-10-CM

## 2021-04-28 DIAGNOSIS — F51.01 PRIMARY INSOMNIA: ICD-10-CM

## 2021-04-28 PROCEDURE — 99214 OFFICE O/P EST MOD 30 MIN: CPT | Performed by: INTERNAL MEDICINE

## 2021-04-29 RX ORDER — CLONAZEPAM 0.5 MG/1
TABLET ORAL
Qty: 60 TABLET | Refills: 3 | Status: SHIPPED | OUTPATIENT
Start: 2021-04-29 | End: 2021-08-17 | Stop reason: SDUPTHER

## 2021-04-29 RX ORDER — ZOLPIDEM TARTRATE 10 MG/1
10 TABLET ORAL NIGHTLY PRN
Qty: 30 TABLET | Refills: 3 | Status: SHIPPED | OUTPATIENT
Start: 2021-04-29 | End: 2021-08-17 | Stop reason: SDUPTHER

## 2021-08-17 ENCOUNTER — OFFICE VISIT (OUTPATIENT)
Dept: FAMILY MEDICINE CLINIC | Facility: CLINIC | Age: 46
End: 2021-08-17

## 2021-08-17 VITALS
WEIGHT: 218.2 LBS | TEMPERATURE: 97.1 F | SYSTOLIC BLOOD PRESSURE: 122 MMHG | DIASTOLIC BLOOD PRESSURE: 80 MMHG | OXYGEN SATURATION: 98 % | BODY MASS INDEX: 34.25 KG/M2 | HEART RATE: 71 BPM | HEIGHT: 67 IN

## 2021-08-17 DIAGNOSIS — F51.01 PRIMARY INSOMNIA: ICD-10-CM

## 2021-08-17 DIAGNOSIS — F41.9 ANXIETY: Primary | ICD-10-CM

## 2021-08-17 PROBLEM — K52.9 COLITIS: Status: ACTIVE | Noted: 2018-12-09

## 2021-08-17 PROCEDURE — 99213 OFFICE O/P EST LOW 20 MIN: CPT | Performed by: NURSE PRACTITIONER

## 2021-08-17 RX ORDER — ZOLPIDEM TARTRATE 10 MG/1
10 TABLET ORAL NIGHTLY PRN
Qty: 30 TABLET | Refills: 0 | Status: SHIPPED | OUTPATIENT
Start: 2021-08-17 | End: 2021-09-22 | Stop reason: SDUPTHER

## 2021-08-17 RX ORDER — CLONAZEPAM 0.5 MG/1
TABLET ORAL
Qty: 60 TABLET | Refills: 0 | Status: SHIPPED | OUTPATIENT
Start: 2021-08-17 | End: 2021-10-05 | Stop reason: SDUPTHER

## 2021-08-17 NOTE — PATIENT INSTRUCTIONS
Anxiety: script for clonazepam 0.5 mg 1 tab po twice a day prn sent to pt's pharmacay; Brett pulled, reviewed and appropriate. Recommend eating a heart healthy diet, drinking plenty of water and exercising 3-5 times a week for more than 30 minutes at a time.    Insomnia: script for ambien 10 mg 1 tab daily at night sent to pt's pharmacy; brett pulled, reviewed and appropriate. Recommend having a good night time routine.

## 2021-09-16 DIAGNOSIS — F51.01 PRIMARY INSOMNIA: ICD-10-CM

## 2021-09-16 DIAGNOSIS — F41.9 ANXIETY: ICD-10-CM

## 2021-09-17 RX ORDER — ZOLPIDEM TARTRATE 10 MG/1
10 TABLET ORAL NIGHTLY PRN
Qty: 30 TABLET | Refills: 0 | OUTPATIENT
Start: 2021-09-17

## 2021-09-17 RX ORDER — CLONAZEPAM 0.5 MG/1
TABLET ORAL
Qty: 60 TABLET | Refills: 0 | OUTPATIENT
Start: 2021-09-17

## 2021-09-17 NOTE — TELEPHONE ENCOUNTER
Please call pt and inform: please refer to my note; pt was informed of a one month supply and was to keep appt. With Dr. Livingston for further scripts. If pt needs another one month refill she will need to set up an appt. With me. She can come into office today or set up a telephone visit for another one month supply if she wishes. Thanks  Jamila

## 2021-09-17 NOTE — TELEPHONE ENCOUNTER
Rx Refill Note  Requested Prescriptions     Pending Prescriptions Disp Refills   • clonazePAM (KlonoPIN) 0.5 MG tablet 60 tablet 0     Si tab po twice a day as needed for anxiety, follow up at least twice yearly   • zolpidem (AMBIEN) 10 MG tablet 30 tablet 0     Sig: Take 1 tablet by mouth At Night As Needed for Sleep. prn      Last office visit with prescribing clinician: 2021      Next office visit with prescribing clinician: Visit date not found            Brina Roblero MA  21, 06:46 EDT

## 2021-09-22 ENCOUNTER — OFFICE VISIT (OUTPATIENT)
Dept: FAMILY MEDICINE CLINIC | Facility: CLINIC | Age: 46
End: 2021-09-22

## 2021-09-22 DIAGNOSIS — F51.01 PRIMARY INSOMNIA: Primary | ICD-10-CM

## 2021-09-22 PROCEDURE — 99442 PR PHYS/QHP TELEPHONE EVALUATION 11-20 MIN: CPT | Performed by: NURSE PRACTITIONER

## 2021-09-22 RX ORDER — ZOLPIDEM TARTRATE 10 MG/1
10 TABLET ORAL NIGHTLY PRN
Qty: 30 TABLET | Refills: 0 | Status: SHIPPED | OUTPATIENT
Start: 2021-09-22 | End: 2021-10-05 | Stop reason: SDUPTHER

## 2021-09-22 NOTE — PROGRESS NOTES
Subjective     Radha Barry is a 46 y.o.. female.     You have chosen to receive care through a telephone visit. Do you consent to use a telephone visit for your medical care today? yes    Pt's visit today is for a follow up on Insomnia and request for refill on her Ambien Pt stating she has been on Ambien for many years, it takes care of her insomnia very well, and denies any side effects/adverse effects with it.Pt works for a [employer] in [place] and remote working from home.          The following portions of the patient's history were reviewed and updated as appropriate: allergies, current medications, past family history, past medical history, past social history, past surgical history and problem list.    Past Medical History:   Diagnosis Date   • Anxiety    • Endometrial polyp    • GERD (gastroesophageal reflux disease)    • Insomnia    • Ovarian cyst    • PONV (postoperative nausea and vomiting)        Past Surgical History:   Procedure Laterality Date   • BREAST CYST EXCISION     • BREAST SURGERY     •  SECTION     • CHOLECYSTECTOMY     • COLONOSCOPY N/A 2019    Procedure: COLONOSCOPY TO CECUM AND INTO TI WITH COLD BIOSPIES AND HOT SNARE POLYPECTOMIES WITH RESOLUTION CLIP X1;  Surgeon: Jordana Cruz MD;  Location: [facility] ENDOSCOPY;  Service: Gastroenterology   • D & C HYSTEROSCOPY N/A 4/3/2019    Procedure: HYSTERSCOPE DILATATION AND CURETTAGE MYOSURE AND NOVASURE ABLATION;  Surgeon: Abimbola Dumont MD;  [place] [facility] [place] [facility] [place]  Service: Obstetrics/Gynecology   • GALLBLADDER SURGERY     • WISDOM TOOTH EXTRACTION         Review of Systems   Constitutional: Negative.    Respiratory: Negative.    Cardiovascular: Negative.    Psychiatric/Behavioral: Negative for dysphoric mood, self-injury, sleep disturbance (on ambien) and suicidal ideas. The patient is not nervous/anxious.        Allergies   Allergen Reactions   • Penicillins Anaphylaxis       Objective     There were no  vitals filed for this visit.; telephone visit  There is no height or weight on file to calculate BMI.; telephone visit    Physical Exam; telephone visit      Current Outpatient Medications:   •  clonazePAM (KlonoPIN) 0.5 MG tablet, 1 tab po twice a day as needed for anxiety, follow up at least twice yearly, Disp: 60 tablet, Rfl: 0  •  omeprazole (priLOSEC) 40 MG capsule, TAKE 1 CAPSULE BY MOUTH EVERY NIGHT, Disp: 30 capsule, Rfl: 5  •  zolpidem (AMBIEN) 10 MG tablet, Take 1 tablet by mouth At Night As Needed for Sleep. prn, Disp: 30 tablet, Rfl: 0    Time: 13 minutes    Assessment/Plan   Diagnoses and all orders for this visit:    1. Primary insomnia (Primary)  -     zolpidem (AMBIEN) 10 MG tablet; Take 1 tablet by mouth At Night As Needed for Sleep. prn  Dispense: 30 tablet; Refill: 0        Patient Instructions   Jacob reviewed and appropriate.  Ambien script refilled        Return for Dr. Livingston as needed/as recommended.

## 2021-10-05 DIAGNOSIS — F51.01 PRIMARY INSOMNIA: ICD-10-CM

## 2021-10-05 DIAGNOSIS — F41.9 ANXIETY: ICD-10-CM

## 2021-10-05 RX ORDER — ZOLPIDEM TARTRATE 10 MG/1
10 TABLET ORAL NIGHTLY PRN
Qty: 30 TABLET | Refills: 1 | Status: SHIPPED | OUTPATIENT
Start: 2021-10-05 | End: 2021-12-02 | Stop reason: SDUPTHER

## 2021-10-05 RX ORDER — CLONAZEPAM 0.5 MG/1
TABLET ORAL
Qty: 60 TABLET | Refills: 0 | Status: SHIPPED | OUTPATIENT
Start: 2021-10-05 | End: 2021-12-02 | Stop reason: SDUPTHER

## 2021-10-05 NOTE — TELEPHONE ENCOUNTER
Rx Refill Note  Requested Prescriptions     Pending Prescriptions Disp Refills   • clonazePAM (KlonoPIN) 0.5 MG tablet 60 tablet 0     Si tab po twice a day as needed for anxiety, follow up at least twice yearly   • zolpidem (AMBIEN) 10 MG tablet 30 tablet 0     Sig: Take 1 tablet by mouth At Night As Needed for Sleep. prn      Last office visit with prescribing clinician: 2021      Next office visit with prescribing clinician: 2021            Vikki Tyler MA  10/05/21, 11:52 EDT

## 2021-10-20 RX ORDER — OMEPRAZOLE 40 MG/1
40 CAPSULE, DELAYED RELEASE ORAL NIGHTLY
Qty: 30 CAPSULE | Refills: 5 | Status: SHIPPED | OUTPATIENT
Start: 2021-10-20 | End: 2022-06-10

## 2021-10-20 NOTE — TELEPHONE ENCOUNTER
Rx Refill Note  Requested Prescriptions     Pending Prescriptions Disp Refills   • omeprazole (priLOSEC) 40 MG capsule [Pharmacy Med Name: OMEPRAZOLE 40MG CAPSULES] 30 capsule 5     Sig: TAKE 1 CAPSULE BY MOUTH EVERY NIGHT      Last office visit with prescribing clinician: 4/28/2021      Next office visit with prescribing clinician: 12/2/2021            Vikki Tyler MA  10/20/21, 11:25 EDT

## 2021-12-02 ENCOUNTER — OFFICE VISIT (OUTPATIENT)
Dept: FAMILY MEDICINE CLINIC | Facility: CLINIC | Age: 46
End: 2021-12-02

## 2021-12-02 VITALS
HEART RATE: 74 BPM | HEIGHT: 67 IN | SYSTOLIC BLOOD PRESSURE: 128 MMHG | RESPIRATION RATE: 18 BRPM | WEIGHT: 210.6 LBS | BODY MASS INDEX: 33.06 KG/M2 | TEMPERATURE: 97.3 F | OXYGEN SATURATION: 98 % | DIASTOLIC BLOOD PRESSURE: 76 MMHG

## 2021-12-02 DIAGNOSIS — F41.9 ANXIETY: ICD-10-CM

## 2021-12-02 DIAGNOSIS — F51.01 PRIMARY INSOMNIA: ICD-10-CM

## 2021-12-02 PROCEDURE — 99213 OFFICE O/P EST LOW 20 MIN: CPT | Performed by: INTERNAL MEDICINE

## 2021-12-02 RX ORDER — CLONAZEPAM 0.5 MG/1
TABLET ORAL
Qty: 60 TABLET | Refills: 2 | Status: SHIPPED | OUTPATIENT
Start: 2021-12-02 | End: 2022-06-09 | Stop reason: SDUPTHER

## 2021-12-02 RX ORDER — ZOLPIDEM TARTRATE 10 MG/1
10 TABLET ORAL NIGHTLY PRN
Qty: 30 TABLET | Refills: 5 | Status: SHIPPED | OUTPATIENT
Start: 2021-12-02 | End: 2022-06-09 | Stop reason: SDUPTHER

## 2021-12-02 NOTE — PROGRESS NOTES
Subjective   Radha Barry is a 46 y.o. female. Patient is here today for   Chief Complaint   Patient presents with   • Med Management     med refills          Vitals:    21 1017   BP: 128/76   Pulse: 74   Resp: 18   Temp: 97.3 °F (36.3 °C)   SpO2: 98%     Body mass index is 32.98 kg/m².      Past Medical History:   Diagnosis Date   • Anxiety    • Endometrial polyp    • GERD (gastroesophageal reflux disease)    • Insomnia    • Ovarian cyst    • PONV (postoperative nausea and vomiting)       Allergies   Allergen Reactions   • Penicillins Anaphylaxis      Social History     Socioeconomic History   • Marital status:    Tobacco Use   • Smoking status: Former Smoker     Packs/day: 0.50     Years: 15.00     Pack years: 7.50     Types: Cigarettes     Quit date: 2008     Years since quittin.8   • Smokeless tobacco: Former User   Vaping Use   • Vaping Use: Never used   Substance and Sexual Activity   • Alcohol use: Yes     Alcohol/week: 2.0 standard drinks     Types: 2 Standard drinks or equivalent per week     Comment: SOCIAL, weekends   • Drug use: No   • Sexual activity: Defer        Current Outpatient Medications:   •  clonazePAM (KlonoPIN) 0.5 MG tablet, 1 tab po twice a day as needed for anxiety, follow up at least twice yearly, Disp: 60 tablet, Rfl: 0  •  omeprazole (priLOSEC) 40 MG capsule, TAKE 1 CAPSULE BY MOUTH EVERY NIGHT, Disp: 30 capsule, Rfl: 5  •  zolpidem (AMBIEN) 10 MG tablet, Take 1 tablet by mouth At Night As Needed for Sleep. prn, Disp: 30 tablet, Rfl: 1     Objective     This patient is here to get a refill on 2 medications. She takes Ambien most nights and she has been able to develop a regular habit of taking fewer than usual [facility]. She used to take this twice a day every day she is gotten is down to maybe every day sometimes less. He has a goal towards stopping this medication altogether eventually.    She and her  are going through a separation and will  probably be having a divorce.    She feels that is bit less stressed now than she was before this decision has been made.       Review of Systems   Constitutional: Negative.    HENT: Negative.    Musculoskeletal: Negative.    Psychiatric/Behavioral: Negative.        Physical Exam  Vitals and nursing note reviewed.   Constitutional:       Appearance: Normal appearance. She is obese.      Comments: Pleasant, neatly groomed, in no distress.   Cardiovascular:      Rate and Rhythm: Regular rhythm.      Heart sounds: Normal heart sounds. No murmur heard.  No gallop.    Pulmonary:      Effort: No respiratory distress.      Breath sounds: Normal breath sounds. No wheezing or rales.   Neurological:      Mental Status: She is alert and oriented to person, place, and time.   Psychiatric:         Mood and Affect: Mood normal.         Behavior: Behavior normal.         Thought Content: Thought content normal.           Problems Addressed this Visit        Mental Health    Anxiety       Sleep    Primary insomnia      Diagnoses       Codes Comments    Anxiety     ICD-10-CM: F41.9  ICD-9-CM: 300.00     Primary insomnia     ICD-10-CM: F51.01  ICD-9-CM: 307.42             PLAN  She feels that her primary insomnia is well controlled though she does take Ambien nightly. Eventually, I would like her to taper and discontinue this medication.    She has situational anxiety and she has been able to decrease her use of clonazepam 0.5 mg by half.    I would like to have her back here for a annual physical exam. She tells me that she had a flu vaccine and Covid vaccine earlier in the year.    Her follow-up appointment should be for an annual physical exam. Lab work prior to that visit should include: Lipid profile, comprehensive metabolic panel, CBC, urinalysis, TSH with reflex free T4, vitamin D level.  No follow-ups on file.

## 2022-06-09 ENCOUNTER — OFFICE VISIT (OUTPATIENT)
Dept: FAMILY MEDICINE CLINIC | Facility: CLINIC | Age: 47
End: 2022-06-09

## 2022-06-09 VITALS
HEART RATE: 75 BPM | SYSTOLIC BLOOD PRESSURE: 136 MMHG | TEMPERATURE: 97.1 F | OXYGEN SATURATION: 99 % | WEIGHT: 197 LBS | RESPIRATION RATE: 18 BRPM | BODY MASS INDEX: 30.92 KG/M2 | DIASTOLIC BLOOD PRESSURE: 82 MMHG | HEIGHT: 67 IN

## 2022-06-09 DIAGNOSIS — F51.01 PRIMARY INSOMNIA: ICD-10-CM

## 2022-06-09 DIAGNOSIS — F41.9 ANXIETY: ICD-10-CM

## 2022-06-09 PROCEDURE — 99214 OFFICE O/P EST MOD 30 MIN: CPT | Performed by: INTERNAL MEDICINE

## 2022-06-09 RX ORDER — ZOLPIDEM TARTRATE 10 MG/1
10 TABLET ORAL NIGHTLY PRN
Qty: 30 TABLET | Refills: 5 | Status: SHIPPED | OUTPATIENT
Start: 2022-06-09 | End: 2022-08-04 | Stop reason: SDUPTHER

## 2022-06-09 RX ORDER — CLONAZEPAM 0.5 MG/1
TABLET ORAL
Qty: 60 TABLET | Refills: 2 | Status: SHIPPED | OUTPATIENT
Start: 2022-06-09 | End: 2022-11-04 | Stop reason: SDUPTHER

## 2022-06-10 RX ORDER — OMEPRAZOLE 40 MG/1
40 CAPSULE, DELAYED RELEASE ORAL NIGHTLY
Qty: 30 CAPSULE | Refills: 5 | Status: SHIPPED | OUTPATIENT
Start: 2022-06-10

## 2022-06-10 NOTE — PROGRESS NOTES
Subjective   Radha Barry is a 47 y.o. female. Patient is here today for   Chief Complaint   Patient presents with   • Med Management     Med refills           Vitals:    22 1528   BP: 136/82   Pulse: 75   Resp: 18   Temp: 97.1 °F (36.2 °C)   SpO2: 99%     Body mass index is 30.85 kg/m².      Past Medical History:   Diagnosis Date   • Anxiety    • Endometrial polyp    • GERD (gastroesophageal reflux disease)    • Insomnia    • Ovarian cyst    • PONV (postoperative nausea and vomiting)       Allergies   Allergen Reactions   • Penicillins Anaphylaxis      Social History     Socioeconomic History   • Marital status:    Tobacco Use   • Smoking status: Former Smoker     Packs/day: 0.50     Years: 15.00     Pack years: 7.50     Types: Cigarettes     Quit date: 2008     Years since quittin.4   • Smokeless tobacco: Former User   Vaping Use   • Vaping Use: Never used   Substance and Sexual Activity   • Alcohol use: Yes     Alcohol/week: 2.0 standard drinks     Types: 2 Standard drinks or equivalent per week     Comment: SOCIAL, weekends   • Drug use: No   • Sexual activity: Defer        Current Outpatient Medications:   •  clonazePAM (KlonoPIN) 0.5 MG tablet, 1 tab po twice a day as needed for anxiety, follow up at least twice yearly, Disp: 60 tablet, Rfl: 2  •  omeprazole (priLOSEC) 40 MG capsule, TAKE 1 CAPSULE BY MOUTH EVERY NIGHT, Disp: 30 capsule, Rfl: 5  •  zolpidem (AMBIEN) 10 MG tablet, Take 1 tablet by mouth At Night As Needed for Sleep. prn, Disp: 30 tablet, Rfl: 5     Objective     She is here to follow-up on insomnia and generalized anxiety.    Tells me that she feels well.    And her  are going through divorce right now.    In spite of this, she feels that she is doing well emotionally.       Review of Systems   Constitutional: Negative.    HENT: Negative.    Respiratory: Negative.    Cardiovascular: Negative.    Musculoskeletal: Negative.    Psychiatric/Behavioral: Negative.         Physical Exam  Vitals and nursing note reviewed.   Constitutional:       General: She is not in acute distress.     Appearance: Normal appearance. She is not ill-appearing, toxic-appearing or diaphoretic.      Comments: Pleasant, neatly groomed, no distress.  BMI 30.   Neurological:      General: No focal deficit present.      Mental Status: She is alert and oriented to person, place, and time.   Psychiatric:         Mood and Affect: Mood normal.         Behavior: Behavior normal.         Thought Content: Thought content normal.           Problems Addressed this Visit        Mental Health    Anxiety    Relevant Medications    clonazePAM (KlonoPIN) 0.5 MG tablet       Sleep    Primary insomnia    Relevant Medications    zolpidem (AMBIEN) 10 MG tablet      Diagnoses       Codes Comments    Primary insomnia     ICD-10-CM: F51.01  ICD-9-CM: 307.42     Anxiety     ICD-10-CM: F41.9  ICD-9-CM: 300.00             PLAN  She takes clonazepam most days for her generalized anxiety.  She feels it works well for her.    She takes Ambien 10 mg nightly most nights for insomnia which she feels is well controlled.    I asked her to follow-up for an annual physical exam at her convenience.  No follow-ups on file.

## 2022-08-04 DIAGNOSIS — F51.01 PRIMARY INSOMNIA: ICD-10-CM

## 2022-08-04 RX ORDER — ZOLPIDEM TARTRATE 10 MG/1
10 TABLET ORAL NIGHTLY PRN
Qty: 30 TABLET | Refills: 1 | Status: SHIPPED | OUTPATIENT
Start: 2022-08-04 | End: 2022-12-01 | Stop reason: SDUPTHER

## 2022-08-04 NOTE — TELEPHONE ENCOUNTER
Rx Refill Note  Requested Prescriptions     Pending Prescriptions Disp Refills   • zolpidem (AMBIEN) 10 MG tablet 30 tablet 5     Sig: Take 1 tablet by mouth At Night As Needed for Sleep. prn      Last office visit with prescribing clinician: 6/9/2022      Next office visit with prescribing clinician: Visit date not found            Vikki Tyler MA  08/04/22, 11:20 EDT

## 2022-11-04 ENCOUNTER — OFFICE VISIT (OUTPATIENT)
Dept: FAMILY MEDICINE CLINIC | Facility: CLINIC | Age: 47
End: 2022-11-04

## 2022-11-04 VITALS
DIASTOLIC BLOOD PRESSURE: 78 MMHG | RESPIRATION RATE: 18 BRPM | BODY MASS INDEX: 27.94 KG/M2 | OXYGEN SATURATION: 100 % | HEART RATE: 68 BPM | SYSTOLIC BLOOD PRESSURE: 122 MMHG | WEIGHT: 178 LBS | TEMPERATURE: 97.8 F | HEIGHT: 67 IN

## 2022-11-04 DIAGNOSIS — F41.9 ANXIETY: Primary | ICD-10-CM

## 2022-11-04 PROCEDURE — 99213 OFFICE O/P EST LOW 20 MIN: CPT | Performed by: NURSE PRACTITIONER

## 2022-11-04 RX ORDER — CLONAZEPAM 0.5 MG/1
TABLET ORAL
Qty: 60 TABLET | Refills: 0 | Status: SHIPPED | OUTPATIENT
Start: 2022-11-04 | End: 2022-12-01 | Stop reason: SDUPTHER

## 2022-11-04 NOTE — PROGRESS NOTES
"Subjective     Radha Barry is a 47 y.o.. female.     Pt here today for follow up on anxiety. Pt stating she has been on clonazepam for a long time for her anxiety and it is working well for her. Pt denies any side effects from clonazepam. Pt here today requesting refill for medication.      The following portions of the patient's history were reviewed and updated as appropriate: allergies, current medications, past family history, past medical history, past social history, past surgical history and problem list.    Past Medical History:   Diagnosis Date   • Anxiety    • Endometrial polyp    • GERD (gastroesophageal reflux disease)    • Insomnia    • Ovarian cyst    • PONV (postoperative nausea and vomiting)        Past Surgical History:   Procedure Laterality Date   • BREAST CYST EXCISION     • BREAST SURGERY     •  SECTION     • CHOLECYSTECTOMY     • COLONOSCOPY N/A 2019    Procedure: COLONOSCOPY TO CECUM AND INTO TI WITH COLD BIOSPIES AND HOT SNARE POLYPECTOMIES WITH RESOLUTION CLIP X1;  Surgeon: Jordana Cruz MD;  Location: [facility] ENDOSCOPY;  Service: Gastroenterology   • D & C HYSTEROSCOPY N/A 4/3/2019    Procedure: HYSTERSCOPE DILATATION AND CURETTAGE MYOSURE AND NOVASURE ABLATION;  Surgeon: Abimbola Dumont MD;  Location: [facility] OR [facility];  Service: Obstetrics/Gynecology   • GALLBLADDER SURGERY     • WISDOM TOOTH EXTRACTION         Review of Systems   Constitutional: Negative.    Respiratory: Negative.    Cardiovascular: Negative.    Psychiatric/Behavioral: Positive for sleep disturbance (takes ambien). Negative for decreased concentration, dysphoric mood, self-injury and suicidal ideas. The patient is nervous/anxious.        Allergies   Allergen Reactions   • Penicillins Anaphylaxis       Objective     Vitals:    22 1116   BP: 122/78   Pulse: 68   Resp: 18   Temp: 97.8 °F (36.6 °C)   TempSrc: Oral   SpO2: 100%   Weight: 80.7 kg (178 lb)   Height: 170.2 cm (67.01\")     Body mass index is " 27.87 kg/m².    Physical Exam  Vitals reviewed.   HENT:      Head: Normocephalic.   Eyes:      Pupils: Pupils are equal, round, and reactive to light.   Cardiovascular:      Rate and Rhythm: Normal rate and regular rhythm.   Pulmonary:      Effort: Pulmonary effort is normal.      Breath sounds: Normal breath sounds.   Musculoskeletal:         General: Normal range of motion.   Neurological:      Mental Status: She is alert and oriented to person, place, and time.      Cranial Nerves: No dysarthria or facial asymmetry.      Motor: Motor function is intact.      Coordination: Coordination is intact.   Psychiatric:         Mood and Affect: Mood normal.         Speech: Speech normal.         Behavior: Behavior normal.           Current Outpatient Medications:   •  clonazePAM (KlonoPIN) 0.5 MG tablet, 1 tab po twice a day as needed for anxiety, follow up at least twice yearly, Disp: 60 tablet, Rfl: 0  •  omeprazole (priLOSEC) 40 MG capsule, TAKE 1 CAPSULE BY MOUTH EVERY NIGHT, Disp: 30 capsule, Rfl: 5  •  zolpidem (AMBIEN) 10 MG tablet, Take 1 tablet by mouth At Night As Needed for Sleep. prn, Disp: 30 tablet, Rfl: 1      Diagnoses and all orders for this visit:    1. Anxiety (Primary)  -     Urine Drug Screen - Urine, Clean Catch  -     clonazePAM (KlonoPIN) 0.5 MG tablet; 1 tab po twice a day as needed for anxiety, follow up at least twice yearly  Dispense: 60 tablet; Refill: 0        Patient Instructions   Jacob reviewed and approp; CSA signed today; UDS ordered today      Return for Dr. Livingston as needed/as recommended.    Answers for HPI/ROS submitted by the patient on 11/2/2022  Please describe your symptoms.: No symptoms. I need my clonazepam refilled for one more month before I see Dr. Livingston in December and would like to talk about Flexeril for my back pain when I do have it.  Have you had these symptoms before?: Yes  How long have you been having these symptoms?: 1-4 days  What is the primary reason for your  visit?: Other

## 2022-11-18 DIAGNOSIS — Z11.59 NEED FOR HEPATITIS C SCREENING TEST: ICD-10-CM

## 2022-11-18 DIAGNOSIS — Z00.00 ROUTINE HEALTH MAINTENANCE: Primary | ICD-10-CM

## 2022-12-01 ENCOUNTER — OFFICE VISIT (OUTPATIENT)
Dept: FAMILY MEDICINE CLINIC | Facility: CLINIC | Age: 47
End: 2022-12-01

## 2022-12-01 VITALS
DIASTOLIC BLOOD PRESSURE: 82 MMHG | SYSTOLIC BLOOD PRESSURE: 134 MMHG | BODY MASS INDEX: 27 KG/M2 | HEART RATE: 68 BPM | OXYGEN SATURATION: 100 % | HEIGHT: 67 IN | WEIGHT: 172 LBS | TEMPERATURE: 98.5 F

## 2022-12-01 DIAGNOSIS — F41.9 ANXIETY: ICD-10-CM

## 2022-12-01 DIAGNOSIS — F51.01 PRIMARY INSOMNIA: ICD-10-CM

## 2022-12-01 PROCEDURE — 99213 OFFICE O/P EST LOW 20 MIN: CPT | Performed by: INTERNAL MEDICINE

## 2022-12-01 RX ORDER — CYCLOBENZAPRINE HYDROCHLORIDE 7.5 MG/1
7.5 TABLET, FILM COATED ORAL 3 TIMES DAILY PRN
Qty: 30 TABLET | Refills: 1 | Status: SHIPPED | OUTPATIENT
Start: 2022-12-01 | End: 2022-12-05

## 2022-12-01 RX ORDER — ZOLPIDEM TARTRATE 10 MG/1
10 TABLET ORAL NIGHTLY PRN
Qty: 30 TABLET | Refills: 1 | Status: SHIPPED | OUTPATIENT
Start: 2022-12-01 | End: 2023-02-07 | Stop reason: SDUPTHER

## 2022-12-01 RX ORDER — CLONAZEPAM 0.5 MG/1
TABLET ORAL
Qty: 60 TABLET | Refills: 0 | Status: SHIPPED | OUTPATIENT
Start: 2022-12-01 | End: 2023-01-13

## 2022-12-02 DIAGNOSIS — F41.9 ANXIETY: ICD-10-CM

## 2022-12-02 DIAGNOSIS — F51.01 PRIMARY INSOMNIA: ICD-10-CM

## 2022-12-02 RX ORDER — ZOLPIDEM TARTRATE 10 MG/1
10 TABLET ORAL NIGHTLY PRN
Qty: 30 TABLET | Refills: 6 | Status: CANCELLED | OUTPATIENT
Start: 2022-12-02

## 2022-12-02 RX ORDER — CLONAZEPAM 0.5 MG/1
TABLET ORAL
Qty: 60 TABLET | Refills: 3 | Status: CANCELLED | OUTPATIENT
Start: 2022-12-02

## 2022-12-05 RX ORDER — CYCLOBENZAPRINE HCL 5 MG
5 TABLET ORAL 3 TIMES DAILY PRN
Qty: 30 TABLET | Refills: 1 | Status: SHIPPED | OUTPATIENT
Start: 2022-12-05

## 2022-12-11 NOTE — PROGRESS NOTES
Subjective   Radha Barry is a 47 y.o. female. Patient is here today for   Chief Complaint   Patient presents with   • med check          Vitals:    22 1107   BP: 134/82   Pulse: 68   Temp: 98.5 °F (36.9 °C)   SpO2: 100%     Body mass index is 26.93 kg/m².      Past Medical History:   Diagnosis Date   • Anxiety    • Endometrial polyp    • GERD (gastroesophageal reflux disease)    • Insomnia    • Ovarian cyst    • PONV (postoperative nausea and vomiting)       Allergies   Allergen Reactions   • Penicillins Anaphylaxis      Social History     Socioeconomic History   • Marital status:    Tobacco Use   • Smoking status: Former     Packs/day: 0.50     Years: 15.00     Pack years: 7.50     Types: Cigarettes     Quit date: 2008     Years since quittin.9   • Smokeless tobacco: Former   Vaping Use   • Vaping Use: Never used   Substance and Sexual Activity   • Alcohol use: Yes     Alcohol/week: 2.0 standard drinks     Types: 2 Standard drinks or equivalent per week     Comment: SOCIAL, weekends   • Drug use: No   • Sexual activity: Defer        Current Outpatient Medications:   •  clonazePAM (KlonoPIN) 0.5 MG tablet, 1 tab po twice a day as needed for anxiety, follow up at least twice yearly, Disp: 60 tablet, Rfl: 0  •  omeprazole (priLOSEC) 40 MG capsule, TAKE 1 CAPSULE BY MOUTH EVERY NIGHT, Disp: 30 capsule, Rfl: 5  •  zolpidem (AMBIEN) 10 MG tablet, Take 1 tablet by mouth At Night As Needed for Sleep. prn, Disp: 30 tablet, Rfl: 1  •  cyclobenzaprine (FLEXERIL) 5 MG tablet, Take 1 tablet by mouth 3 (Three) Times a Day As Needed for Muscle Spasms., Disp: 30 tablet, Rfl: 1     Objective     History of Present Illness  She is here to get a refill of Ambien and clonazepam.  She and I have discussed my concerns about her chronic use of these medications and I have asked her on a few occasions to do what she could to taper and discontinue this medications but she is having a hard time cutting  back.    She feels that her generalized anxiety and insomnia are well controlled.       Review of Systems   Constitutional: Negative.    HENT: Negative.    Respiratory: Negative.    Cardiovascular: Negative.    Musculoskeletal: Negative.    Psychiatric/Behavioral: Negative.        Physical Exam  Vitals and nursing note reviewed.   Constitutional:       Appearance: Normal appearance.      Comments: Pleasant, neatly groomed, no distress.  Weight 172 pounds, BMI 27.   Cardiovascular:      Rate and Rhythm: Regular rhythm.      Heart sounds: Normal heart sounds. No murmur heard.    No gallop.   Pulmonary:      Breath sounds: Normal breath sounds.   Neurological:      Mental Status: She is alert and oriented to person, place, and time.   Psychiatric:         Mood and Affect: Mood normal.         Behavior: Behavior normal.           Problems Addressed this Visit        Mental Health    Anxiety    Relevant Medications    clonazePAM (KlonoPIN) 0.5 MG tablet       Sleep    Primary insomnia    Relevant Medications    zolpidem (AMBIEN) 10 MG tablet   Diagnoses       Codes Comments    Anxiety     ICD-10-CM: F41.9  ICD-9-CM: 300.00     Primary insomnia     ICD-10-CM: F51.01  ICD-9-CM: 307.42             PLAN  I asked her to keep in mind that Ambien and clonazepam are not medications that I intend to write indefinitely and that she needs to make an effort to cut back on these medications.    I asked her to follow-up for an annual physical exam at her convenience.  No follow-ups on file.

## 2022-12-20 ENCOUNTER — TELEPHONE (OUTPATIENT)
Dept: FAMILY MEDICINE CLINIC | Facility: CLINIC | Age: 47
End: 2022-12-20

## 2022-12-20 NOTE — TELEPHONE ENCOUNTER
----- Message from Laura Blount MA sent at 12/20/2022  8:54 AM EST -----  Regarding: FW: Lab and Physical appointment  Contact: [phone]  Please advise. Pt is having trouble scheduling labs. Thanks!  ----- Message -----  From: Radha Barry  Sent: 12/15/2022   3:09 PM EST  To: [facility] Main Clinical Pool  Subject: Lab and Physical appointment                     I am trying to schedule labs and then a physical with Dr Livingston.  I dont see where I can schedule my labs first then after the physical.  It will be after the first of the year. Can you help?

## 2023-01-13 DIAGNOSIS — F41.9 ANXIETY: ICD-10-CM

## 2023-01-13 RX ORDER — CLONAZEPAM 0.5 MG/1
TABLET ORAL
Qty: 60 TABLET | Refills: 1 | Status: SHIPPED | OUTPATIENT
Start: 2023-01-13 | End: 2023-02-07 | Stop reason: SDUPTHER

## 2023-01-13 NOTE — TELEPHONE ENCOUNTER
Last OV: 12/1/2022  Last Labs: 9/14/2020 (scheduled for 1/18/23)  Next OV: 1/25/2023  Last Refill: 12/1/2022

## 2023-01-19 LAB
ALBUMIN SERPL-MCNC: 4.8 G/DL (ref 3.5–5.2)
ALBUMIN/GLOB SERPL: 3 G/DL
ALP SERPL-CCNC: 58 U/L (ref 39–117)
ALT SERPL-CCNC: 19 U/L (ref 1–33)
APPEARANCE UR: CLEAR
AST SERPL-CCNC: 20 U/L (ref 1–32)
BACTERIA #/AREA URNS HPF: ABNORMAL /HPF
BASOPHILS # BLD AUTO: 0.03 10*3/MM3 (ref 0–0.2)
BASOPHILS NFR BLD AUTO: 0.5 % (ref 0–1.5)
BILIRUB SERPL-MCNC: 0.4 MG/DL (ref 0–1.2)
BILIRUB UR QL STRIP: NEGATIVE
BUN SERPL-MCNC: 12 MG/DL (ref 6–20)
BUN/CREAT SERPL: 16.9 (ref 7–25)
CALCIUM SERPL-MCNC: 9.4 MG/DL (ref 8.6–10.5)
CASTS URNS MICRO: ABNORMAL
CHLORIDE SERPL-SCNC: 104 MMOL/L (ref 98–107)
CHOLEST SERPL-MCNC: 182 MG/DL (ref 0–200)
CO2 SERPL-SCNC: 22.9 MMOL/L (ref 22–29)
COLOR UR: YELLOW
CREAT SERPL-MCNC: 0.71 MG/DL (ref 0.57–1)
EGFRCR SERPLBLD CKD-EPI 2021: 107 ML/MIN/1.73
EOSINOPHIL # BLD AUTO: 0.05 10*3/MM3 (ref 0–0.4)
EOSINOPHIL NFR BLD AUTO: 0.8 % (ref 0.3–6.2)
EPI CELLS #/AREA URNS HPF: ABNORMAL /HPF
ERYTHROCYTE [DISTWIDTH] IN BLOOD BY AUTOMATED COUNT: 13.1 % (ref 12.3–15.4)
GLOBULIN SER CALC-MCNC: 1.6 GM/DL
GLUCOSE SERPL-MCNC: 88 MG/DL (ref 65–99)
GLUCOSE UR QL STRIP: NEGATIVE
HCT VFR BLD AUTO: 37.3 % (ref 34–46.6)
HCV AB S/CO SERPL IA: <0.1 S/CO RATIO (ref 0–0.9)
HDLC SERPL-MCNC: 53 MG/DL (ref 40–60)
HGB BLD-MCNC: 12.6 G/DL (ref 12–15.9)
HGB UR QL STRIP: ABNORMAL
IMM GRANULOCYTES # BLD AUTO: 0.02 10*3/MM3 (ref 0–0.05)
IMM GRANULOCYTES NFR BLD AUTO: 0.3 % (ref 0–0.5)
KETONES UR QL STRIP: NEGATIVE
LDLC SERPL CALC-MCNC: 109 MG/DL (ref 0–100)
LDLC/HDLC SERPL: 2.01 {RATIO}
LEUKOCYTE ESTERASE UR QL STRIP: ABNORMAL
LYMPHOCYTES # BLD AUTO: 0.82 10*3/MM3 (ref 0.7–3.1)
LYMPHOCYTES NFR BLD AUTO: 13.4 % (ref 19.6–45.3)
MCH RBC QN AUTO: 30.4 PG (ref 26.6–33)
MCHC RBC AUTO-ENTMCNC: 33.8 G/DL (ref 31.5–35.7)
MCV RBC AUTO: 90.1 FL (ref 79–97)
MONOCYTES # BLD AUTO: 0.45 10*3/MM3 (ref 0.1–0.9)
MONOCYTES NFR BLD AUTO: 7.4 % (ref 5–12)
NEUTROPHILS # BLD AUTO: 4.73 10*3/MM3 (ref 1.7–7)
NEUTROPHILS NFR BLD AUTO: 77.6 % (ref 42.7–76)
NITRITE UR QL STRIP: NEGATIVE
NRBC BLD AUTO-RTO: 0 /100 WBC (ref 0–0.2)
PH UR STRIP: 6 [PH] (ref 5–8)
PLATELET # BLD AUTO: 258 10*3/MM3 (ref 140–450)
POTASSIUM SERPL-SCNC: 4.4 MMOL/L (ref 3.5–5.2)
PROT SERPL-MCNC: 6.4 G/DL (ref 6–8.5)
PROT UR QL STRIP: ABNORMAL
RBC # BLD AUTO: 4.14 10*6/MM3 (ref 3.77–5.28)
RBC #/AREA URNS HPF: ABNORMAL /HPF
SODIUM SERPL-SCNC: 138 MMOL/L (ref 136–145)
SP GR UR STRIP: 1.02 (ref 1–1.03)
TRIGL SERPL-MCNC: 113 MG/DL (ref 0–150)
TSH SERPL DL<=0.005 MIU/L-ACNC: 1.35 UIU/ML (ref 0.27–4.2)
UROBILINOGEN UR STRIP-MCNC: ABNORMAL MG/DL
VLDLC SERPL CALC-MCNC: 20 MG/DL (ref 5–40)
WBC # BLD AUTO: 6.1 10*3/MM3 (ref 3.4–10.8)
WBC #/AREA URNS HPF: ABNORMAL /HPF

## 2023-02-02 ENCOUNTER — TELEPHONE (OUTPATIENT)
Dept: FAMILY MEDICINE CLINIC | Facility: CLINIC | Age: 48
End: 2023-02-02

## 2023-02-07 ENCOUNTER — OFFICE VISIT (OUTPATIENT)
Dept: FAMILY MEDICINE CLINIC | Facility: CLINIC | Age: 48
End: 2023-02-07
Payer: COMMERCIAL

## 2023-02-07 VITALS
HEIGHT: 67 IN | OXYGEN SATURATION: 99 % | BODY MASS INDEX: 27 KG/M2 | TEMPERATURE: 97.7 F | WEIGHT: 172 LBS | HEART RATE: 81 BPM | DIASTOLIC BLOOD PRESSURE: 70 MMHG | SYSTOLIC BLOOD PRESSURE: 120 MMHG

## 2023-02-07 DIAGNOSIS — F41.9 ANXIETY: Primary | ICD-10-CM

## 2023-02-07 DIAGNOSIS — F51.01 PRIMARY INSOMNIA: ICD-10-CM

## 2023-02-07 PROCEDURE — 99213 OFFICE O/P EST LOW 20 MIN: CPT | Performed by: INTERNAL MEDICINE

## 2023-02-07 RX ORDER — ZOLPIDEM TARTRATE 10 MG/1
10 TABLET ORAL NIGHTLY PRN
Qty: 30 TABLET | Refills: 1 | Status: SHIPPED | OUTPATIENT
Start: 2023-02-07 | End: 2023-03-31

## 2023-02-07 RX ORDER — CLONAZEPAM 0.5 MG/1
TABLET ORAL
Qty: 60 TABLET | Refills: 1 | Status: SHIPPED | OUTPATIENT
Start: 2023-02-07

## 2023-02-07 NOTE — PROGRESS NOTES
Subjective   Radha Barry is a 47 y.o. female. Patient is here today for   Chief Complaint   Patient presents with   • Med Refill          Vitals:    02/07/23 1132   BP: 120/70   Pulse: 81   Temp: 97.7 °F (36.5 °C)   SpO2: 99%     Body mass index is 26.94 kg/m².      Past Medical History:   Diagnosis Date   • Anxiety    • Endometrial polyp    • GERD (gastroesophageal reflux disease)    • Insomnia    • Ovarian cyst    • PONV (postoperative nausea and vomiting)       Allergies   Allergen Reactions   • Penicillins Anaphylaxis      Social History     Socioeconomic History   • Marital status:    Tobacco Use   • Smoking status: Former     Packs/day: 0.50     Years: 15.00     Pack years: 7.50     Types: Cigarettes     Quit date: 1/16/2008     Years since quitting: 15.0   • Smokeless tobacco: Former   Vaping Use   • Vaping Use: Never used   Substance and Sexual Activity   • Alcohol use: Yes     Alcohol/week: 2.0 standard drinks     Types: 2 Standard drinks or equivalent per week     Comment: SOCIAL, weekends   • Drug use: No   • Sexual activity: Defer        Current Outpatient Medications:   •  clonazePAM (KlonoPIN) 0.5 MG tablet, TAKE 1 TABLET BY MOUTH TWICE DAILY AS NEEDED FOR ANXIETY. FOLLOW UP AT LEAST 2 TIMES YEARLY, Disp: 60 tablet, Rfl: 1  •  zolpidem (AMBIEN) 10 MG tablet, Take 1 tablet by mouth At Night As Needed for Sleep. prn, Disp: 30 tablet, Rfl: 1  •  cyclobenzaprine (FLEXERIL) 5 MG tablet, Take 1 tablet by mouth 3 (Three) Times a Day As Needed for Muscle Spasms., Disp: 30 tablet, Rfl: 1  •  omeprazole (priLOSEC) 40 MG capsule, TAKE 1 CAPSULE BY MOUTH EVERY NIGHT, Disp: 30 capsule, Rfl: 5     Objective     History of Present Illness  This pleasant 47-year-old woman is here to follow-up on situational anxiety and primary insomnia.    She tells me that she feels well.       Review of Systems   Constitutional: Negative.    HENT: Negative.    Eyes: Negative.    Respiratory: Negative.    Musculoskeletal:  Negative.    Psychiatric/Behavioral: Negative.        Physical Exam  Vitals and nursing note reviewed.   Constitutional:       Appearance: Normal appearance. She is normal weight.      Comments: Pleasant, neatly groomed, no distress.   Cardiovascular:      Rate and Rhythm: Regular rhythm.      Heart sounds: Normal heart sounds. No murmur heard.    No gallop.   Pulmonary:      Effort: No respiratory distress.      Breath sounds: Normal breath sounds. No wheezing or rales.   Neurological:      Mental Status: She is alert and oriented to person, place, and time.   Psychiatric:         Mood and Affect: Mood normal.         Behavior: Behavior normal.         Thought Content: Thought content normal.           Problems Addressed this Visit        Mental Health    Anxiety - Primary    Relevant Medications    clonazePAM (KlonoPIN) 0.5 MG tablet    Other Relevant Orders    Urine Drug Screen - Urine, Clean Catch       Sleep    Primary insomnia    Relevant Medications    zolpidem (AMBIEN) 10 MG tablet   Diagnoses       Codes Comments    Anxiety    -  Primary ICD-10-CM: F41.9  ICD-9-CM: 300.00     Primary insomnia     ICD-10-CM: F51.01  ICD-9-CM: 307.42             PLAN  I cautioned her about taking Ambien and clonazepam for extended periods of time.  She asked how she might stop taking either of these medications and I told her that that would be possible by decreasing the dose by half, taking it at the same frequency for couple weeks, decreasing the dose by half again continue the same frequency and then discontinuing.    I do not expect her to discontinue these medications for the time being however.    She is lost significant weight.  She has been trying to.  She currently weighs 172 pounds.  When I checked her weight in June last year   She weighed 197 pounds.         She should be following up with me for an annual physical in about 3 months.       No follow-ups on file.

## 2023-02-08 LAB
AMPHETAMINES UR QL SCN: NEGATIVE NG/ML
BARBITURATES UR QL SCN: NEGATIVE NG/ML
BENZODIAZ UR QL SCN: NEGATIVE NG/ML
BZE UR QL SCN: NEGATIVE NG/ML
CANNABINOIDS UR QL SCN: POSITIVE NG/ML
CREAT UR-MCNC: 76.7 MG/DL (ref 20–300)
LABORATORY COMMENT REPORT: ABNORMAL
METHADONE UR QL SCN: NEGATIVE NG/ML
OPIATES UR QL SCN: NEGATIVE NG/ML
OXYCODONE+OXYMORPHONE UR QL SCN: NEGATIVE NG/ML
PCP UR QL: NEGATIVE NG/ML
PH UR: 5.8 [PH] (ref 4.5–8.9)
PROPOXYPH UR QL SCN: NEGATIVE NG/ML

## 2023-03-31 DIAGNOSIS — F51.01 PRIMARY INSOMNIA: ICD-10-CM

## 2023-03-31 RX ORDER — ZOLPIDEM TARTRATE 10 MG/1
TABLET ORAL
Qty: 30 TABLET | Refills: 0 | Status: SHIPPED | OUTPATIENT
Start: 2023-03-31

## 2023-05-08 ENCOUNTER — OFFICE VISIT (OUTPATIENT)
Dept: FAMILY MEDICINE CLINIC | Facility: CLINIC | Age: 48
End: 2023-05-08
Payer: COMMERCIAL

## 2023-05-08 VITALS
DIASTOLIC BLOOD PRESSURE: 68 MMHG | BODY MASS INDEX: 27.94 KG/M2 | HEART RATE: 73 BPM | SYSTOLIC BLOOD PRESSURE: 124 MMHG | HEIGHT: 67 IN | WEIGHT: 178 LBS | OXYGEN SATURATION: 99 %

## 2023-05-08 DIAGNOSIS — F51.01 PRIMARY INSOMNIA: ICD-10-CM

## 2023-05-08 DIAGNOSIS — F41.9 ANXIETY: ICD-10-CM

## 2023-05-08 PROCEDURE — 99213 OFFICE O/P EST LOW 20 MIN: CPT | Performed by: INTERNAL MEDICINE

## 2023-05-08 RX ORDER — ZOLPIDEM TARTRATE 10 MG/1
10 TABLET ORAL NIGHTLY PRN
Qty: 30 TABLET | Refills: 1 | Status: SHIPPED | OUTPATIENT
Start: 2023-05-08

## 2023-05-08 RX ORDER — BACLOFEN 10 MG/1
10 TABLET ORAL 3 TIMES DAILY
Qty: 30 TABLET | Refills: 1 | Status: SHIPPED | OUTPATIENT
Start: 2023-05-08

## 2023-05-08 RX ORDER — CLONAZEPAM 0.5 MG/1
TABLET ORAL
Qty: 60 TABLET | Refills: 1 | Status: SHIPPED | OUTPATIENT
Start: 2023-05-08

## 2023-05-09 PROBLEM — E66.9 OBESITY (BMI 35.0-39.9 WITHOUT COMORBIDITY): Status: RESOLVED | Noted: 2021-01-24 | Resolved: 2023-05-09

## 2023-05-09 NOTE — PROGRESS NOTES
Subjective   Radha Barry is a 48 y.o. female. Patient is here today for   Chief Complaint   Patient presents with   • Med Refill          Vitals:    05/08/23 1404   BP: 124/68   Pulse: 73   SpO2: 99%     Body mass index is 27.88 kg/m².      Past Medical History:   Diagnosis Date   • Anxiety    • Endometrial polyp    • GERD (gastroesophageal reflux disease)    • Insomnia    • Ovarian cyst    • PONV (postoperative nausea and vomiting)       Allergies   Allergen Reactions   • Penicillins Anaphylaxis      Social History     Socioeconomic History   • Marital status:    Tobacco Use   • Smoking status: Former     Packs/day: 0.50     Years: 15.00     Pack years: 7.50     Types: Cigarettes     Quit date: 1/16/2008     Years since quitting: 15.3   • Smokeless tobacco: Former   Vaping Use   • Vaping Use: Never used   Substance and Sexual Activity   • Alcohol use: Not Currently     Comment: SOCIAL, weekends   • Drug use: No   • Sexual activity: Defer        Current Outpatient Medications:   •  clonazePAM (KlonoPIN) 0.5 MG tablet, TAKE 1 TABLET BY MOUTH TWICE DAILY AS NEEDED FOR ANXIETY. FOLLOW UP AT LEAST 2 TIMES YEARLY, Disp: 60 tablet, Rfl: 1  •  omeprazole (priLOSEC) 40 MG capsule, TAKE 1 CAPSULE BY MOUTH EVERY NIGHT, Disp: 30 capsule, Rfl: 5  •  zolpidem (AMBIEN) 10 MG tablet, Take 1 tablet by mouth At Night As Needed for Sleep., Disp: 30 tablet, Rfl: 1  •  baclofen (LIORESAL) 10 MG tablet, Take 1 tablet by mouth 3 (Three) Times a Day., Disp: 30 tablet, Rfl: 1  •  cyclobenzaprine (FLEXERIL) 5 MG tablet, Take 1 tablet by mouth 3 (Three) Times a Day As Needed for Muscle Spasms. (Patient not taking: Reported on 5/8/2023), Disp: 30 tablet, Rfl: 1     Objective     History of Present Illness  She is here for an appointment to follow-up on generalized anxiety and insomnia.    She feels pretty well all in all.           Review of Systems   Constitutional: Negative.    HENT: Negative.    Respiratory: Negative.     Cardiovascular: Negative.    Musculoskeletal: Negative.    Psychiatric/Behavioral: Negative.        Physical Exam  Vitals and nursing note reviewed.   Constitutional:       Appearance: Normal appearance.      Comments: Pleasant, neatly groomed, no distress.  BMI 27.   Cardiovascular:      Rate and Rhythm: Regular rhythm.      Heart sounds: Normal heart sounds.   Pulmonary:      Effort: No respiratory distress.      Breath sounds: Normal breath sounds. No wheezing or rales.   Neurological:      Mental Status: She is alert and oriented to person, place, and time.   Psychiatric:         Mood and Affect: Mood normal.         Behavior: Behavior normal.         Thought Content: Thought content normal.           Problems Addressed this Visit        Mental Health    Anxiety    Relevant Medications    clonazePAM (KlonoPIN) 0.5 MG tablet       Sleep    Primary insomnia    Relevant Medications    zolpidem (AMBIEN) 10 MG tablet   Diagnoses       Codes Comments    Primary insomnia     ICD-10-CM: F51.01  ICD-9-CM: 307.42     Anxiety     ICD-10-CM: F41.9  ICD-9-CM: 300.00             PLAN  She finds that her primary insomnia and situational anxiety are pretty well controlled.    I asked her to follow-up in about 6 months.  No follow-ups on file.  Answers for HPI/ROS submitted by the patient on 5/8/2023  Please describe your symptoms.: Med Check  Have you had these symptoms before?: Yes  How long have you been having these symptoms?: Greater than 2 weeks  What is the primary reason for your visit?: Other

## 2023-06-15 ENCOUNTER — TELEPHONE (OUTPATIENT)
Dept: GASTROENTEROLOGY | Facility: CLINIC | Age: 48
End: 2023-06-15

## 2023-06-15 NOTE — TELEPHONE ENCOUNTER
Caller: Radha Barry    Relationship: Self    Best call back number: [phone]    Who is your current provider: MICHELLE    Who would you like your new provider to be: DR. WYNN IN [place]    What are your reasons for transferring care: SHE LIVES IN [place] NOW.

## 2023-08-09 RX ORDER — BACLOFEN 10 MG/1
TABLET ORAL
Qty: 30 TABLET | Refills: 1 | Status: SHIPPED | OUTPATIENT
Start: 2023-08-09

## 2023-09-03 DIAGNOSIS — F41.9 ANXIETY: ICD-10-CM

## 2023-09-03 DIAGNOSIS — F51.01 PRIMARY INSOMNIA: ICD-10-CM

## 2023-09-06 RX ORDER — ZOLPIDEM TARTRATE 10 MG/1
10 TABLET ORAL NIGHTLY PRN
Qty: 30 TABLET | Refills: 1 | Status: SHIPPED | OUTPATIENT
Start: 2023-09-06

## 2023-09-06 RX ORDER — CLONAZEPAM 0.5 MG/1
TABLET ORAL
Qty: 60 TABLET | Refills: 0 | Status: SHIPPED | OUTPATIENT
Start: 2023-09-06

## 2023-09-06 NOTE — TELEPHONE ENCOUNTER
Rx Refill Note  Requested Prescriptions     Pending Prescriptions Disp Refills    zolpidem (AMBIEN) 10 MG tablet 30 tablet 1     Sig: Take 1 tablet by mouth At Night As Needed for Sleep.    clonazePAM (KlonoPIN) 0.5 MG tablet 60 tablet 0     Si p.o. twice daily as needed anxiety.      Last office visit with prescribing clinician: 2023   Last telemedicine visit with prescribing clinician: Visit date not found   Next office visit with prescribing clinician: 2023                         Would you like a call back once the refill request has been completed: [] Yes [] No    If the office needs to give you a call back, can they leave a voicemail: [] Yes [] No    Spring Luque MA  23, 08:37 EDT

## 2023-10-31 ENCOUNTER — OFFICE VISIT (OUTPATIENT)
Dept: FAMILY MEDICINE CLINIC | Facility: CLINIC | Age: 48
End: 2023-10-31
Payer: COMMERCIAL

## 2023-10-31 VITALS
BODY MASS INDEX: 29.51 KG/M2 | RESPIRATION RATE: 20 BRPM | WEIGHT: 188 LBS | HEART RATE: 78 BPM | SYSTOLIC BLOOD PRESSURE: 112 MMHG | OXYGEN SATURATION: 98 % | TEMPERATURE: 97.9 F | HEIGHT: 67 IN | DIASTOLIC BLOOD PRESSURE: 68 MMHG

## 2023-10-31 DIAGNOSIS — F41.9 ANXIETY: ICD-10-CM

## 2023-10-31 DIAGNOSIS — F51.01 PRIMARY INSOMNIA: ICD-10-CM

## 2023-10-31 PROCEDURE — 99213 OFFICE O/P EST LOW 20 MIN: CPT | Performed by: INTERNAL MEDICINE

## 2023-10-31 NOTE — PROGRESS NOTES
Subjective   Radha Barry is a 48 y.o. female. Patient is here today for   Chief Complaint   Patient presents with    Follow-up     Med check need refill on ambien & kolonopin           Vitals:    10/31/23 0931   BP: 112/68   Pulse: 78   Resp: 20   Temp: 97.9 °F (36.6 °C)   SpO2: 98%     Body mass index is 29.44 kg/m².      Past Medical History:   Diagnosis Date    Anxiety     Endometrial polyp     GERD (gastroesophageal reflux disease)     Insomnia     Ovarian cyst     PONV (postoperative nausea and vomiting)       Allergies   Allergen Reactions    Penicillins Anaphylaxis      Social History     Socioeconomic History    Marital status:    Tobacco Use    Smoking status: Former     Packs/day: 0.50     Years: 15.00     Additional pack years: 0.00     Total pack years: 7.50     Types: Cigarettes     Start date: 1997     Quit date: 1/16/2008     Years since quitting: 15.8     Passive exposure: Past    Smokeless tobacco: Former   Vaping Use    Vaping Use: Never used   Substance and Sexual Activity    Alcohol use: Not Currently     Comment: SOCIAL, weekends    Drug use: No    Sexual activity: Defer        Current Outpatient Medications:     clonazePAM (KlonoPIN) 0.5 MG tablet, 1 p.o. twice daily as needed anxiety., Disp: 60 tablet, Rfl: 2    ondansetron (ZOFRAN) 4 MG tablet, Take 1 tablet by mouth Every 8 (Eight) Hours As Needed for Nausea or Vomiting (one to two tablets po q 6 hours prn nausea)., Disp: 15 tablet, Rfl: 1    Semaglutide-Weight Management (Wegovy) 0.25 MG/0.5ML solution auto-injector, Inject 0.25 mg under the skin into the appropriate area as directed 1 (One) Time Per Week., Disp: 1 mL, Rfl: 0    zolpidem (AMBIEN) 10 MG tablet, Take 1 tablet by mouth At Night As Needed for Sleep., Disp: 30 tablet, Rfl: 2     Objective     History of Present Illness  She takes clonazepam for situational anxiety.  She takes it most days.    She takes Ambien nightly and she also takes that most nights for sleep onset  insomnia.    She is concerned about her excess weight.  She has a BMI of 29.        Review of Systems   Constitutional: Negative.    HENT: Negative.     Respiratory: Negative.     Cardiovascular: Negative.    Musculoskeletal: Negative.    Psychiatric/Behavioral: Negative.         Physical Exam  Vitals and nursing note reviewed.   Constitutional:       General: She is not in acute distress.     Appearance: Normal appearance. She is obese. She is not ill-appearing, toxic-appearing or diaphoretic.      Comments: Pleasant, neatly groomed, stress.  BMI 30.   Cardiovascular:      Rate and Rhythm: Regular rhythm.      Heart sounds: Normal heart sounds.      No gallop.   Pulmonary:      Effort: No respiratory distress.      Breath sounds: Normal breath sounds. No wheezing or rales.   Neurological:      Mental Status: She is alert and oriented to person, place, and time.   Psychiatric:         Mood and Affect: Mood normal.         Behavior: Behavior normal.         Thought Content: Thought content normal.           Problems Addressed this Visit          Mental Health    Anxiety       Sleep    Primary insomnia     Diagnoses         Codes Comments    Anxiety     ICD-10-CM: F41.9  ICD-9-CM: 300.00     Primary insomnia     ICD-10-CM: F51.01  ICD-9-CM: 307.42               PLAN  Her generalized anxiety she feels is well controlled on clonazepam twice daily 0.5 mg.  I cautioned her about the habit-forming nature of this medication.  As she gets older it is a risk factor developing memory loss as well.  She and I discussed that today.    She has sleep onset insomnia.  She has a dependency to zolpidem 10 mg nightly.  She continues to take that medication.  I cautioned her about that as well.    She is overweight with a BMI of 29.  I sent out a prescription for Wegovy.  I asked her to follow-up with me in 3 months.  No follow-ups on file.  Answers submitted by the patient for this visit:  Other (Submitted on 10/30/2023)  Please  describe your symptoms.: Med Check Ambien and Clonazepam  Have you had these symptoms before?: Yes  How long have you been having these symptoms?: Greater than 2 weeks  Please list any medications you are currently taking for this condition.: Ambien and Clonazepam  Primary Reason for Visit (Submitted on 10/30/2023)  What is the primary reason for your visit?: Other

## 2023-11-02 DIAGNOSIS — F51.01 PRIMARY INSOMNIA: ICD-10-CM

## 2023-11-02 DIAGNOSIS — F41.9 ANXIETY: ICD-10-CM

## 2023-11-02 RX ORDER — ONDANSETRON 4 MG/1
4 TABLET, FILM COATED ORAL EVERY 8 HOURS PRN
Qty: 15 TABLET | Refills: 1 | Status: SHIPPED | OUTPATIENT
Start: 2023-11-02

## 2023-11-02 RX ORDER — SEMAGLUTIDE 0.25 MG/.5ML
0.25 INJECTION, SOLUTION SUBCUTANEOUS WEEKLY
Qty: 1 ML | Refills: 0 | Status: SHIPPED | OUTPATIENT
Start: 2023-11-02

## 2023-11-03 PROBLEM — E66.3 OVERWEIGHT (BMI 25.0-29.9): Status: ACTIVE | Noted: 2023-11-03

## 2023-11-03 RX ORDER — CLONAZEPAM 0.5 MG/1
TABLET ORAL
Qty: 60 TABLET | Refills: 2 | Status: SHIPPED | OUTPATIENT
Start: 2023-11-03

## 2023-11-03 RX ORDER — ZOLPIDEM TARTRATE 10 MG/1
10 TABLET ORAL NIGHTLY PRN
Qty: 30 TABLET | Refills: 2 | Status: SHIPPED | OUTPATIENT
Start: 2023-11-03

## 2024-01-16 NOTE — TELEPHONE ENCOUNTER
Rx Refill Note  Requested Prescriptions     Pending Prescriptions Disp Refills    Semaglutide-Weight Management solution auto-injector 1.7 mg        Last office visit with prescribing clinician: 10/31/2023   Last telemedicine visit with prescribing clinician: Visit date not found   Next office visit with prescribing clinician: 2/1/2024

## 2024-01-21 ENCOUNTER — HOSPITAL ENCOUNTER (INPATIENT)
Facility: HOSPITAL | Age: 49
LOS: 1 days | Discharge: HOME | End: 2024-01-22
Attending: EMERGENCY MEDICINE | Admitting: INTERNAL MEDICINE
Payer: COMMERCIAL

## 2024-01-21 DIAGNOSIS — N17.9 ACUTE RENAL FAILURE, UNSPECIFIED ACUTE RENAL FAILURE TYPE: Primary | ICD-10-CM

## 2024-01-21 DIAGNOSIS — T50.905A MEDICATION REACTION, INITIAL ENCOUNTER: ICD-10-CM

## 2024-01-21 DIAGNOSIS — E87.6 HYPOKALEMIA: ICD-10-CM

## 2024-01-21 DIAGNOSIS — E87.1 HYPONATREMIA: ICD-10-CM

## 2024-01-21 LAB
ALBUMIN SERPL-MCNC: 5.6 G/DL (ref 3.5–5.2)
ALBUMIN/GLOB SERPL: 1.4 G/DL
ALP SERPL-CCNC: 75 U/L (ref 39–117)
ALT SERPL W P-5'-P-CCNC: 17 U/L (ref 1–33)
ANION GAP SERPL CALCULATED.3IONS-SCNC: 28.9 MMOL/L (ref 5–15)
AST SERPL-CCNC: 22 U/L (ref 1–32)
BACTERIA UR QL AUTO: ABNORMAL /HPF
BASOPHILS # BLD AUTO: 0.01 10*3/MM3 (ref 0–0.2)
BASOPHILS NFR BLD AUTO: 0 % (ref 0–1.5)
BILIRUB SERPL-MCNC: 0.8 MG/DL (ref 0–1.2)
BILIRUB UR QL STRIP: ABNORMAL
BUN SERPL-MCNC: 48 MG/DL (ref 6–20)
BUN/CREAT SERPL: 9.1 (ref 7–25)
CALCIUM SPEC-SCNC: 10.5 MG/DL (ref 8.6–10.5)
CHLORIDE SERPL-SCNC: 85 MMOL/L (ref 98–107)
CK SERPL-CCNC: 277 U/L (ref 20–180)
CLARITY UR: ABNORMAL
CO2 SERPL-SCNC: 20.1 MMOL/L (ref 22–29)
COLOR UR: YELLOW
CREAT SERPL-MCNC: 5.26 MG/DL (ref 0.57–1)
DEPRECATED RDW RBC AUTO: 43.6 FL (ref 37–54)
EGFRCR SERPLBLD CKD-EPI 2021: 9.6 ML/MIN/1.73
EOSINOPHIL # BLD AUTO: 0.01 10*3/MM3 (ref 0–0.4)
EOSINOPHIL NFR BLD AUTO: 0 % (ref 0.3–6.2)
ERYTHROCYTE [DISTWIDTH] IN BLOOD BY AUTOMATED COUNT: 14.2 % (ref 12.3–15.4)
FINE GRAN CASTS URNS QL MICRO: ABNORMAL /LPF
GLOBULIN UR ELPH-MCNC: 4.1 GM/DL
GLUCOSE BLDC GLUCOMTR-MCNC: 119 MG/DL (ref 70–130)
GLUCOSE SERPL-MCNC: 168 MG/DL (ref 65–99)
GLUCOSE UR STRIP-MCNC: NEGATIVE MG/DL
HCG SERPL QL: NEGATIVE
HCT VFR BLD AUTO: 47.3 % (ref 34–46.6)
HGB BLD-MCNC: 15.2 G/DL (ref 12–15.9)
HGB UR QL STRIP.AUTO: ABNORMAL
HOLD SPECIMEN: NORMAL
HOLD SPECIMEN: NORMAL
HYALINE CASTS UR QL AUTO: ABNORMAL /LPF
IMM GRANULOCYTES # BLD AUTO: 0.08 10*3/MM3 (ref 0–0.05)
IMM GRANULOCYTES NFR BLD AUTO: 0.3 % (ref 0–0.5)
KETONES UR QL STRIP: ABNORMAL
LEUKOCYTE ESTERASE UR QL STRIP.AUTO: NEGATIVE
LIPASE SERPL-CCNC: 58 U/L (ref 13–60)
LYMPHOCYTES # BLD AUTO: 1.27 10*3/MM3 (ref 0.7–3.1)
LYMPHOCYTES NFR BLD AUTO: 5.5 % (ref 19.6–45.3)
MAGNESIUM SERPL-MCNC: 2.1 MG/DL (ref 1.6–2.6)
MCH RBC QN AUTO: 26.8 PG (ref 26.6–33)
MCHC RBC AUTO-ENTMCNC: 32.1 G/DL (ref 31.5–35.7)
MCV RBC AUTO: 83.4 FL (ref 79–97)
MONOCYTES # BLD AUTO: 1.36 10*3/MM3 (ref 0.1–0.9)
MONOCYTES NFR BLD AUTO: 5.9 % (ref 5–12)
NEUTROPHILS NFR BLD AUTO: 20.18 10*3/MM3 (ref 1.7–7)
NEUTROPHILS NFR BLD AUTO: 88.3 % (ref 42.7–76)
NITRITE UR QL STRIP: NEGATIVE
PH UR STRIP.AUTO: <=5 [PH] (ref 4.5–8)
PLATELET # BLD AUTO: 465 10*3/MM3 (ref 140–450)
PMV BLD AUTO: 9.8 FL (ref 6–12)
POTASSIUM SERPL-SCNC: 3.1 MMOL/L (ref 3.5–5.2)
PROT SERPL-MCNC: 9.7 G/DL (ref 6–8.5)
PROT UR QL STRIP: ABNORMAL
RBC # BLD AUTO: 5.67 10*6/MM3 (ref 3.77–5.28)
RBC # UR STRIP: ABNORMAL /HPF
REF LAB TEST METHOD: ABNORMAL
SODIUM SERPL-SCNC: 134 MMOL/L (ref 136–145)
SODIUM UR-SCNC: 53 MMOL/L
SP GR UR STRIP: 1.03 (ref 1–1.03)
SQUAMOUS #/AREA URNS HPF: ABNORMAL /HPF
UROBILINOGEN UR QL STRIP: ABNORMAL
WBC # UR STRIP: ABNORMAL /HPF
WBC NRBC COR # BLD AUTO: 22.91 10*3/MM3 (ref 3.4–10.8)
WHOLE BLOOD HOLD COAG: NORMAL
WHOLE BLOOD HOLD SPECIMEN: NORMAL

## 2024-01-21 PROCEDURE — 85025 COMPLETE CBC W/AUTO DIFF WBC: CPT | Performed by: EMERGENCY MEDICINE

## 2024-01-21 PROCEDURE — 25810000003 SODIUM CHLORIDE 0.9 % SOLUTION: Performed by: EMERGENCY MEDICINE

## 2024-01-21 PROCEDURE — 25010000002 ONDANSETRON PER 1 MG

## 2024-01-21 PROCEDURE — 25010000002 ONDANSETRON PER 1 MG: Performed by: INTERNAL MEDICINE

## 2024-01-21 PROCEDURE — 84300 ASSAY OF URINE SODIUM: CPT | Performed by: INTERNAL MEDICINE

## 2024-01-21 PROCEDURE — 25810000003 LACTATED RINGERS SOLUTION: Performed by: EMERGENCY MEDICINE

## 2024-01-21 PROCEDURE — 82550 ASSAY OF CK (CPK): CPT | Performed by: INTERNAL MEDICINE

## 2024-01-21 PROCEDURE — 80053 COMPREHEN METABOLIC PANEL: CPT | Performed by: EMERGENCY MEDICINE

## 2024-01-21 PROCEDURE — 82948 REAGENT STRIP/BLOOD GLUCOSE: CPT

## 2024-01-21 PROCEDURE — 83690 ASSAY OF LIPASE: CPT | Performed by: EMERGENCY MEDICINE

## 2024-01-21 PROCEDURE — 99223 1ST HOSP IP/OBS HIGH 75: CPT | Performed by: INTERNAL MEDICINE

## 2024-01-21 PROCEDURE — 25810000003 SODIUM CHLORIDE 0.9 % SOLUTION: Performed by: INTERNAL MEDICINE

## 2024-01-21 PROCEDURE — 82570 ASSAY OF URINE CREATININE: CPT | Performed by: INTERNAL MEDICINE

## 2024-01-21 PROCEDURE — 83735 ASSAY OF MAGNESIUM: CPT | Performed by: INTERNAL MEDICINE

## 2024-01-21 PROCEDURE — 94799 UNLISTED PULMONARY SVC/PX: CPT

## 2024-01-21 PROCEDURE — 99285 EMERGENCY DEPT VISIT HI MDM: CPT

## 2024-01-21 PROCEDURE — 81001 URINALYSIS AUTO W/SCOPE: CPT | Performed by: INTERNAL MEDICINE

## 2024-01-21 PROCEDURE — 84703 CHORIONIC GONADOTROPIN ASSAY: CPT | Performed by: EMERGENCY MEDICINE

## 2024-01-21 RX ORDER — POTASSIUM CHLORIDE 20 MEQ/1
40 TABLET, EXTENDED RELEASE ORAL ONCE
Status: COMPLETED | OUTPATIENT
Start: 2024-01-21 | End: 2024-01-21

## 2024-01-21 RX ORDER — PANTOPRAZOLE SODIUM 40 MG/10ML
40 INJECTION, POWDER, LYOPHILIZED, FOR SOLUTION INTRAVENOUS
Status: DISCONTINUED | OUTPATIENT
Start: 2024-01-21 | End: 2024-01-22 | Stop reason: HOSPADM

## 2024-01-21 RX ORDER — ONDANSETRON 2 MG/ML
4 INJECTION INTRAMUSCULAR; INTRAVENOUS EVERY 6 HOURS PRN
Status: DISCONTINUED | OUTPATIENT
Start: 2024-01-21 | End: 2024-01-22 | Stop reason: HOSPADM

## 2024-01-21 RX ORDER — SODIUM CHLORIDE 9 MG/ML
150 INJECTION, SOLUTION INTRAVENOUS CONTINUOUS
Status: DISCONTINUED | OUTPATIENT
Start: 2024-01-21 | End: 2024-01-22 | Stop reason: HOSPADM

## 2024-01-21 RX ORDER — SODIUM CHLORIDE 0.9 % (FLUSH) 0.9 %
10 SYRINGE (ML) INJECTION AS NEEDED
Status: DISCONTINUED | OUTPATIENT
Start: 2024-01-21 | End: 2024-01-22 | Stop reason: HOSPADM

## 2024-01-21 RX ORDER — SODIUM CHLORIDE 0.9 % (FLUSH) 0.9 %
10 SYRINGE (ML) INJECTION EVERY 12 HOURS SCHEDULED
Status: DISCONTINUED | OUTPATIENT
Start: 2024-01-21 | End: 2024-01-22 | Stop reason: HOSPADM

## 2024-01-21 RX ORDER — CLONAZEPAM 0.5 MG/1
0.5 TABLET, ORALLY DISINTEGRATING ORAL 2 TIMES DAILY PRN
Status: DISCONTINUED | OUTPATIENT
Start: 2024-01-21 | End: 2024-01-22 | Stop reason: CLARIF

## 2024-01-21 RX ORDER — ACETAMINOPHEN 325 MG/1
650 TABLET ORAL EVERY 4 HOURS PRN
Status: DISCONTINUED | OUTPATIENT
Start: 2024-01-21 | End: 2024-01-22 | Stop reason: HOSPADM

## 2024-01-21 RX ORDER — ACETAMINOPHEN 160 MG/5ML
650 SOLUTION ORAL EVERY 4 HOURS PRN
Status: DISCONTINUED | OUTPATIENT
Start: 2024-01-21 | End: 2024-01-22 | Stop reason: HOSPADM

## 2024-01-21 RX ORDER — PANTOPRAZOLE SODIUM 40 MG/10ML
40 INJECTION, POWDER, LYOPHILIZED, FOR SOLUTION INTRAVENOUS ONCE
Status: COMPLETED | OUTPATIENT
Start: 2024-01-21 | End: 2024-01-21

## 2024-01-21 RX ORDER — CHOLECALCIFEROL (VITAMIN D3) 125 MCG
5 CAPSULE ORAL NIGHTLY PRN
Status: DISCONTINUED | OUTPATIENT
Start: 2024-01-21 | End: 2024-01-22 | Stop reason: HOSPADM

## 2024-01-21 RX ORDER — ACETAMINOPHEN 650 MG/1
650 SUPPOSITORY RECTAL EVERY 4 HOURS PRN
Status: DISCONTINUED | OUTPATIENT
Start: 2024-01-21 | End: 2024-01-22 | Stop reason: HOSPADM

## 2024-01-21 RX ORDER — NITROGLYCERIN 0.4 MG/1
0.4 TABLET SUBLINGUAL
Status: DISCONTINUED | OUTPATIENT
Start: 2024-01-21 | End: 2024-01-22 | Stop reason: HOSPADM

## 2024-01-21 RX ORDER — SODIUM CHLORIDE 9 MG/ML
40 INJECTION, SOLUTION INTRAVENOUS AS NEEDED
Status: DISCONTINUED | OUTPATIENT
Start: 2024-01-21 | End: 2024-01-22 | Stop reason: HOSPADM

## 2024-01-21 RX ORDER — ZOLPIDEM TARTRATE 5 MG/1
5 TABLET ORAL NIGHTLY PRN
Status: DISCONTINUED | OUTPATIENT
Start: 2024-01-21 | End: 2024-01-22 | Stop reason: HOSPADM

## 2024-01-21 RX ORDER — ONDANSETRON 2 MG/ML
4 INJECTION INTRAMUSCULAR; INTRAVENOUS ONCE
Status: COMPLETED | OUTPATIENT
Start: 2024-01-21 | End: 2024-01-21

## 2024-01-21 RX ORDER — ONDANSETRON 2 MG/ML
INJECTION INTRAMUSCULAR; INTRAVENOUS
Status: COMPLETED
Start: 2024-01-21 | End: 2024-01-21

## 2024-01-21 RX ORDER — ONDANSETRON 4 MG/1
4 TABLET, ORALLY DISINTEGRATING ORAL EVERY 6 HOURS PRN
Status: DISCONTINUED | OUTPATIENT
Start: 2024-01-21 | End: 2024-01-22 | Stop reason: HOSPADM

## 2024-01-21 RX ADMIN — SODIUM CHLORIDE 150 ML/HR: 9 INJECTION, SOLUTION INTRAVENOUS at 18:51

## 2024-01-21 RX ADMIN — Medication 10 ML: at 20:05

## 2024-01-21 RX ADMIN — Medication 5 MG: at 22:32

## 2024-01-21 RX ADMIN — POTASSIUM CHLORIDE 40 MEQ: 1500 TABLET, EXTENDED RELEASE ORAL at 17:23

## 2024-01-21 RX ADMIN — ONDANSETRON 4 MG: 2 INJECTION INTRAMUSCULAR; INTRAVENOUS at 22:32

## 2024-01-21 RX ADMIN — SODIUM CHLORIDE 1000 ML: 9 INJECTION, SOLUTION INTRAVENOUS at 16:16

## 2024-01-21 RX ADMIN — SODIUM CHLORIDE, POTASSIUM CHLORIDE, SODIUM LACTATE AND CALCIUM CHLORIDE 1000 ML: 600; 310; 30; 20 INJECTION, SOLUTION INTRAVENOUS at 17:21

## 2024-01-21 RX ADMIN — ACETAMINOPHEN 650 MG: 325 TABLET ORAL at 20:05

## 2024-01-21 RX ADMIN — ONDANSETRON 4 MG: 2 INJECTION INTRAMUSCULAR; INTRAVENOUS at 16:19

## 2024-01-21 RX ADMIN — PANTOPRAZOLE SODIUM 40 MG: 40 INJECTION, POWDER, FOR SOLUTION INTRAVENOUS at 17:23

## 2024-01-21 NOTE — ED PROVIDER NOTES
Subjective   History of Present Illness  48-year-old female presents emergency room complaint of abdominal pain nausea and vomiting.  Patient states she started taking Wegovy 2 days ago and after her first dose which she states was larger than the starting dose should be she started having symptoms of abdominal pain nausea and vomiting and anorexia.  She has not eaten since Thursday evening 3 days ago and has had continued nausea and vomiting since 2 days ago when she took the pills.  Patient states she is never had this happen to her before as she has never been on Wegovy before.  Patient denies headache visual changes fever neck pain jaw pain cough shortness of breath diaphoresis chest pain diarrhea or dysuria.      Review of Systems   Constitutional:  Positive for activity change and appetite change. Negative for chills, diaphoresis, fatigue and fever.   HENT:  Negative for congestion, rhinorrhea and sore throat.    Eyes:  Negative for photophobia and visual disturbance.   Respiratory:  Negative for cough and shortness of breath.    Cardiovascular:  Negative for chest pain, palpitations and leg swelling.   Gastrointestinal:  Positive for abdominal pain, nausea and vomiting. Negative for abdominal distention and diarrhea.   Genitourinary:  Negative for dysuria and flank pain.   Musculoskeletal:  Negative for arthralgias and back pain.   Skin:  Negative for rash.   Neurological:  Negative for dizziness, weakness and headaches.   Psychiatric/Behavioral:  Negative for agitation and behavioral problems.        Past Medical History:   Diagnosis Date    Anxiety     Endometrial polyp     GERD (gastroesophageal reflux disease)     Insomnia     Ovarian cyst     PONV (postoperative nausea and vomiting)        Allergies   Allergen Reactions    Penicillins Anaphylaxis       Past Surgical History:   Procedure Laterality Date    BREAST CYST EXCISION      BREAST SURGERY       SECTION      CHOLECYSTECTOMY      COLONOSCOPY  N/A 2019    Procedure: COLONOSCOPY TO CECUM AND INTO TI WITH COLD BIOSPIES AND HOT SNARE POLYPECTOMIES WITH RESOLUTION CLIP X1;  Surgeon: Jordana Cruz MD;  Location: [facility] ENDOSCOPY;  Service: Gastroenterology    D & C HYSTEROSCOPY N/A 2019    Procedure: HYSTERSCOPE DILATATION AND CURETTAGE MYOSURE AND NOVASURE ABLATION;  Surgeon: Abimbola Dumont MD;  Location: [facility] OR [facility];  Service: Obstetrics/Gynecology    GALLBLADDER SURGERY      TUBAL ABDOMINAL LIGATION      WISDOM TOOTH EXTRACTION         Family History   Problem Relation Age of Onset    Migraines Mother     Diverticulitis Mother     Malig Hyperthermia Neg Hx        Social History     Socioeconomic History    Marital status:    Tobacco Use    Smoking status: Former     Packs/day: 0.50     Years: 15.00     Additional pack years: 0.00     Total pack years: 7.50     Types: Cigarettes     Start date:      Quit date: 2008     Years since quittin.0     Passive exposure: Past    Smokeless tobacco: Former   Vaping Use    Vaping Use: Never used   Substance and Sexual Activity    Alcohol use: Not Currently     Comment: SOCIAL, weekends    Drug use: No    Sexual activity: Defer           Objective   Physical Exam  Vitals and nursing note reviewed.   Constitutional:       General: She is not in acute distress.     Appearance: Normal appearance. She is not ill-appearing, toxic-appearing or diaphoretic.      Comments: 48-year-old female in no acute distress   HENT:      Head: Normocephalic and atraumatic.      Nose: Nose normal.      Mouth/Throat:      Mouth: Mucous membranes are moist.   Eyes:      Conjunctiva/sclera: Conjunctivae normal.   Cardiovascular:      Rate and Rhythm: Normal rate.      Pulses: Normal pulses.   Pulmonary:      Effort: Pulmonary effort is normal.   Abdominal:      General: Abdomen is flat. There is no distension.      Tenderness: There is abdominal tenderness. There is no right CVA tenderness, left CVA  tenderness, guarding or rebound.      Comments: Patient has some mild epigastric tenderness to palpation   Musculoskeletal:         General: No swelling. Normal range of motion.      Cervical back: Normal range of motion and neck supple.   Skin:     General: Skin is warm and dry.   Neurological:      General: No focal deficit present.      Mental Status: She is alert.   Psychiatric:         Mood and Affect: Mood normal.         Procedures           ED Course  ED Course as of 01/21/24 1714   Sun Jan 21, 2024   1700 Patient's lab work is markedly abnormal reflecting hemoconcentration and acute renal failure along with mild hypokalemia and mild hyponatremia.  Patient's white blood cell count is 23,000 which is most likely reactive. []   1710 Spoke with hospitalist Dr. Urban who accepted patient to hospitalist service. []      ED Course User Index  [] Deric Tapia MD                                             Medical Decision Making  My differential diagnosis for abdominal pain includes but is not limited to:  Gastritis, gastroenteritis, peptic ulcer disease, GERD, esophageal perforation, acute appendicitis, mesenteric adenitis, Meckel's diverticulum, epiploic appendagitis, diverticulitis, colon cancer, ulcerative colitis, Crohn's disease, intussusception, small bowel obstruction, adhesions, ischemic bowel, perforated viscus, ileus, obstipation, biliary colic, cholecystitis, cholelithiasis, Genaro-Saravanan Evan, hepatitis, pancreatitis, common bile duct obstruction, cholangitis, bile leak, splenic trauma, splenic rupture, splenic infarction, splenic abscess, abdominal wall hematoma, abdominal wall abscess, intra-abdominal abscess, ascites, spontaneous bacterial peritonitis, hernia, UTI, cystitis, prostatitis, ureterolithiasis, urinary obstruction, AAA, myocardial infarction, pneumonia, cancer, porphyria, DKA, medications, sickle cell, viral syndrome, zoster     Amount and/or Complexity of Data  Reviewed  Labs: ordered. Decision-making details documented in ED Course.    Risk  Prescription drug management.        Final diagnoses:   Acute renal failure, unspecified acute renal failure type   Hypokalemia   Hyponatremia   Medication reaction, initial encounter       ED Disposition  ED Disposition       ED Disposition   Decision to Admit    Condition   --    Comment   Level of Care: Med/Surg [1]   Diagnosis: Acute renal failure [[id number]]   Admitting Physician: RUT DOWNEY [[id number]]   Attending Physician: ARUNA TAPIA [[id number]]   Certification: I Certify That Inpatient Hospital Services Are Medically Necessary For Greater Than 2 Midnights                 No follow-up provider specified.       Medication List      No changes were made to your prescriptions during this visit.            Aruna Tapia MD  01/21/24 2123

## 2024-01-21 NOTE — H&P
[facility] GROUP HOSPITALIST     Adalberto Livingston MD    CHIEF COMPLAINT:     Abdominal pain/Nausea/vomiting    HISTORY OF PRESENT ILLNESS:  Radha Barry is a 48 y.o. female with a past medical history of anxiety and insomnia. She reports she was started on Wegovy on Friday and approximately 2 hours after taking her first dose she developed nausea and started vomiting multiple times per day. Patient reports she was started on the 1.7mg dose instead of starting on the lower 0.25mg dose. Patient has not had anything to eat since Thursday and has had decreased urine output, but is still urinating occasionally. Patient reports she came in today, because she was weak, dizzy, and symptoms were not improving.   In the ED, patient was found to have a Cr of 5.2, discussed with Dr. Tapia and will admit the patient for further hydration.       Past Medical History:   Diagnosis Date    Anxiety     Endometrial polyp     GERD (gastroesophageal reflux disease)     Insomnia     Ovarian cyst     PONV (postoperative nausea and vomiting)      Past Surgical History:   Procedure Laterality Date    BREAST CYST EXCISION      BREAST SURGERY       SECTION      CHOLECYSTECTOMY      COLONOSCOPY N/A 2019    Procedure: COLONOSCOPY TO CECUM AND INTO TI WITH COLD BIOSPIES AND HOT SNARE POLYPECTOMIES WITH RESOLUTION CLIP X1;  Surgeon: Jordana rCuz MD;  Location: [facility] ENDOSCOPY;  Service: Gastroenterology    D & C HYSTEROSCOPY N/A 2019    Procedure: HYSTERSCOPE DILATATION AND CURETTAGE MYOSURE AND NOVASURE ABLATION;  Surgeon: Abimbola Dumont MD;  [place] [facility] [place] [facility] [place]  Service: Obstetrics/Gynecology    GALLBLADDER SURGERY      TUBAL ABDOMINAL LIGATION      WISDOM TOOTH EXTRACTION       Family History   Problem Relation Age of Onset    Migraines Mother     Diverticulitis Mother     Malig Hyperthermia Neg Hx      Social History     Tobacco Use    Smoking status: Former     Packs/day: 0.50     Years: 15.00  "    Additional pack years: 0.00     Total pack years: 7.50     Types: Cigarettes     Start date:      Quit date: 2008     Years since quittin.0     Passive exposure: Past    Smokeless tobacco: Former   Vaping Use    Vaping Use: Never used   Substance Use Topics    Alcohol use: Not Currently     Comment: SOCIAL, weekends    Drug use: No     (Not in a hospital admission)    Allergies:  Penicillins    Immunization History   Administered Date(s) Administered    COVID-19 (MODERNA) 1st,2nd,3rd Dose Monovalent 2021, 2021, 2021    Flu Vaccine Intradermal Quad 18-64YR 2018           REVIEW OF SYSTEMS:    Please see the above history of present illness for pertinent positives and negatives.  The remainder of the patient's systems have been reviewed and are negative.     Vital Signs  Temp:  [98 °F (36.7 °C)] 98 °F (36.7 °C)  Heart Rate:  [] 102  Resp:  [18] 18  BP: (123-142)/(84-93) 142/86    Oxygen Therapy  SpO2: 99 %}    Body mass index is 29.51 kg/m².    Flowsheet Rows      Flowsheet Row First Filed Value   Admission Height 170 cm (66.93\") Documented at 2024 1605   Admission Weight 85.3 kg (188 lb) Documented at 2024 1605                 Physical Exam:    Physical Exam  Vitals reviewed.   Constitutional:       General: She is not in acute distress.     Appearance: She is overweight.   HENT:      Head: Normocephalic and atraumatic.   Cardiovascular:      Rate and Rhythm: Regular rhythm. Tachycardia present.      Heart sounds: No murmur heard.  Pulmonary:      Effort: Pulmonary effort is normal. No respiratory distress.      Breath sounds: No wheezing or rales.   Abdominal:      General: Bowel sounds are normal. There is no distension.      Palpations: Abdomen is soft.      Tenderness: There is no abdominal tenderness.   Musculoskeletal:      Right lower leg: No edema.      Left lower leg: No edema.   Skin:     General: Skin is warm and dry.   Neurological:      Mental " Status: She is alert.   Psychiatric:         Mood and Affect: Mood normal.         Behavior: Behavior normal.           Emotional Behavior: wnl   Judgment and Insight:    Mental Status:    Alertness:   Memory:     Mood and Affect:         Depression                 Anxiety      Debilities: none   Physical Weakness:   Handicaps:     Disabilities:     Agitation:        Results Review:    I reviewed the patient's new clinical results.  Lab Results (most recent)       Procedure Component Value Units Date/Time    CK [[id number]] Collected: 01/21/24 1615    Specimen: Blood from Arm, Right Updated: 01/21/24 1741    [place] Draw [[id number]] Collected: 01/21/24 1615    Specimen: Blood from Arm, Right Updated: 01/21/24 1717    Narrative:      The following orders were created for panel order [place] Draw.  Procedure                               Abnormality         Status                     ---------                               -----------         ------                     Green Top (Gel)[[id number]]                                  Final result               Lavender Top[[id number]]                                     Final result               Gold Top - SST[[id number]]                                   Final result               Light Blue Top[[id number]]                                   Final result                 Please view results for these tests on the individual orders.    Light Blue Top [[id number]] Collected: 01/21/24 1615    Specimen: Blood from Arm, Right Updated: 01/21/24 1717     Extra Tube Hold for add-ons.     Comment: Auto resulted       Green Top (Gel) [[id number]] Collected: 01/21/24 1615    Specimen: Blood from Arm, Right Updated: 01/21/24 1717     Extra Tube Hold for add-ons.     Comment: Auto resulted.       Lavender Top [[id number]] Collected: 01/21/24 1615    Specimen: Blood from Arm, Right Updated: 01/21/24 1717     Extra Tube hold for add-on     Comment: Auto resulted       Gold Top - SST [[id number]]  Collected: 01/21/24 1615    Specimen: Blood from Arm, Right Updated: 01/21/24 1717     Extra Tube Hold for add-ons.     Comment: Auto resulted.       hCG, Serum, Qualitative [[id number]]  (Normal) Collected: 01/21/24 1615    Specimen: Blood from Arm, Right Updated: 01/21/24 1648     HCG Qualitative Negative    Comprehensive Metabolic Panel [[id number]]  (Abnormal) Collected: 01/21/24 1615    Specimen: Blood from Arm, Right Updated: 01/21/24 1645     Glucose 168 mg/dL      BUN 48 mg/dL      Creatinine 5.26 mg/dL      Sodium 134 mmol/L      Potassium 3.1 mmol/L      Chloride 85 mmol/L      CO2 20.1 mmol/L      Calcium 10.5 mg/dL      Total Protein 9.7 g/dL      Albumin 5.6 g/dL      ALT (SGPT) 17 U/L      AST (SGOT) 22 U/L      Alkaline Phosphatase 75 U/L      Total Bilirubin 0.8 mg/dL      Globulin 4.1 gm/dL      A/G Ratio 1.4 g/dL      BUN/Creatinine Ratio 9.1     Anion Gap 28.9 mmol/L      eGFR 9.6 mL/min/1.73      Comment: <15 Indicative of kidney failure       Narrative:      GFR Normal >60  Chronic Kidney Disease <60  Kidney Failure <15      Lipase [[id number]]  (Normal) Collected: 01/21/24 1615    Specimen: Blood from Arm, Right Updated: 01/21/24 1645     Lipase 58 U/L     CBC & Differential [[id number]]  (Abnormal) Collected: 01/21/24 1615    Specimen: Blood from Arm, Right Updated: 01/21/24 1632    Narrative:      The following orders were created for panel order CBC & Differential.  Procedure                               Abnormality         Status                     ---------                               -----------         ------                     CBC Auto Differential[[id number]]        Abnormal            Final result                 Please view results for these tests on the individual orders.    CBC Auto Differential [[id number]]  (Abnormal) Collected: 01/21/24 1615    Specimen: Blood from Arm, Right Updated: 01/21/24 1632     WBC 22.91 10*3/mm3      RBC 5.67 10*6/mm3      Hemoglobin 15.2 g/dL       Hematocrit 47.3 %      MCV 83.4 fL      MCH 26.8 pg      MCHC 32.1 g/dL      RDW 14.2 %      RDW-SD 43.6 fl      MPV 9.8 fL      Platelets 465 10*3/mm3      Neutrophil % 88.3 %      Lymphocyte % 5.5 %      Monocyte % 5.9 %      Eosinophil % 0.0 %      Basophil % 0.0 %      Immature Grans % 0.3 %      Neutrophils, Absolute 20.18 10*3/mm3      Lymphocytes, Absolute 1.27 10*3/mm3      Monocytes, Absolute 1.36 10*3/mm3      Eosinophils, Absolute 0.01 10*3/mm3      Basophils, Absolute 0.01 10*3/mm3      Immature Grans, Absolute 0.08 10*3/mm3             Imaging Results (Most Recent)       None          ECG/EMG Results (most recent)       None              Assessment & Plan   Active Hospital Problems    Diagnosis  POA    **Acute renal failure [N17.9]  Yes      Resolved Hospital Problems   No resolved problems to display.       Acute Renal Failure  - likely d/t volume depletion   - patient was given 2L bolus in the ED  - continue IV fluids at 150cc/hr  - Urine studies pending  - check renal ultrasound   - repeat in am    Mild rhabdomyolysis  - Ck 277  - continue IV fluids   - repeat in am    Hypokalemia  - likely d/t n/v  - given 40meq in the ED   - check magnesium  - will repeat in am     N/V/Abdominal pain  - likely side effect of Wegovy  - lipase wnl   - HCG negative  - supportive care with IV fluids, antiemetics  - clear liquid diet    Leukocytosis  - likely reactive  - afebrile  - continue to monitor closely     Hyperglycemia  - likely reactive  - monitor with routine accu-checks    Anxiety/Insomnia  - holding home meds given significant renal failure     DVT Prophylaxis  - SCDs    Code Status  - Full Code    Patient is high risk given acute renal failure     I discussed the patient's findings and my recommendations with patient.          Valery Ba DO  01/21/24  17:50 EST        Note disclaimer: At [facility] we believe that sharing information builds trust and better relationships. You are receiving  this note because you recently visited [facility]. It is possible you will see health information before a provider has talked with you about it. This kind of information can be easy to misunderstand. To help you fully understand what it means for your health, we urge you to discuss this note with your provider.

## 2024-01-21 NOTE — ED NOTES
"Nursing report ED to floor  Radha Barry  48 y.o.  female    HPI :   Chief Complaint   Patient presents with    Vomiting       Admitting doctor:   Valery Ba DO    Admitting diagnosis:   The primary encounter diagnosis was Acute renal failure, unspecified acute renal failure type. Diagnoses of Hypokalemia, Hyponatremia, and Medication reaction, initial encounter were also pertinent to this visit.    Code status:   Current Code Status       Date Active Code Status Order ID Comments User Context       Not on file            Allergies:   Penicillins    Isolation:   No active isolations    Intake and Output  No intake or output data in the 24 hours ending 01/21/24 1731    Weight:       01/21/24  1605   Weight: 85.3 kg (188 lb)       Most recent vitals:   Vitals:    01/21/24 1605 01/21/24 1630 01/21/24 1721   BP: 123/84  136/93   Pulse: 105 96 90   Resp: 18  18   Temp: 98 °F (36.7 °C)     SpO2: 95% 97% 99%   Weight: 85.3 kg (188 lb)     Height: 170 cm (66.93\")         Active LDAs/IV Access:   Lines, Drains & Airways       Active LDAs       Name Placement date Placement time Site Days    Peripheral IV 01/21/24 1614 Right Antecubital 01/21/24  1614  Antecubital  less than 1                    Labs (abnormal labs have a star):   Labs Reviewed   COMPREHENSIVE METABOLIC PANEL - Abnormal; Notable for the following components:       Result Value    Glucose 168 (*)     BUN 48 (*)     Creatinine 5.26 (*)     Sodium 134 (*)     Potassium 3.1 (*)     Chloride 85 (*)     CO2 20.1 (*)     Total Protein 9.7 (*)     Albumin 5.6 (*)     Anion Gap 28.9 (*)     eGFR 9.6 (*)     All other components within normal limits    Narrative:     GFR Normal >60  Chronic Kidney Disease <60  Kidney Failure <15     CBC WITH AUTO DIFFERENTIAL - Abnormal; Notable for the following components:    WBC 22.91 (*)     RBC 5.67 (*)     Hematocrit 47.3 (*)     Platelets 465 (*)     Neutrophil % 88.3 (*)     Lymphocyte % 5.5 (*)     Eosinophil % " 0.0 (*)     Neutrophils, Absolute 20.18 (*)     Monocytes, Absolute 1.36 (*)     Immature Grans, Absolute 0.08 (*)     All other components within normal limits   HCG, SERUM, QUALITATIVE - Normal   LIPASE - Normal   RAINBOW DRAW    Narrative:     The following orders were created for panel order [place] Draw.  Procedure                               Abnormality         Status                     ---------                               -----------         ------                     Green Top (Gel)[[id number]]                                  Final result               Lavender Top[[id number]]                                     Final result               Gold Top - SST[[id number]]                                   Final result               Light Blue Top[[id number]]                                   Final result                 Please view results for these tests on the individual orders.   URINALYSIS W/ MICROSCOPIC IF INDICATED (NO CULTURE)   GREEN TOP   LAVENDER TOP   GOLD TOP - SST   LIGHT BLUE TOP   CBC AND DIFFERENTIAL    Narrative:     The following orders were created for panel order CBC & Differential.  Procedure                               Abnormality         Status                     ---------                               -----------         ------                     CBC Auto Differential[[id number]]        Abnormal            Final result                 Please view results for these tests on the individual orders.       Results       Procedure Component Value Ref Range Date/Time    [place] Draw [[id number]] Collected: 01/21/24 1615    Order Status: Completed Specimen: Blood from Arm, Right Updated: 01/21/24 1717    Narrative:      The following orders were created for panel order [place] Draw.  Procedure                               Abnormality         Status                     ---------                               -----------         ------                     Green Top (Gel)[[id number]]                                   Final result               Lavender Top[[id number]]                                     Final result               Gold Top - SST[[id number]]                                   Final result               Light Blue Top[[id number]]                                   Final result                 Please view results for these tests on the individual orders.    hCG, Serum, Qualitative [[id number]]  (Normal) Collected: 01/21/24 1615    Order Status: Completed Specimen: Blood from Arm, Right Updated: 01/21/24 1648     HCG Qualitative Negative Negative     Comprehensive Metabolic Panel [[id number]]  (Abnormal) Collected: 01/21/24 1615    Order Status: Completed Specimen: Blood from Arm, Right Updated: 01/21/24 1645     Glucose 168 65 - 99 mg/dL      BUN 48 6 - 20 mg/dL      Creatinine 5.26 0.57 - 1.00 mg/dL      Sodium 134 136 - 145 mmol/L      Potassium 3.1 3.5 - 5.2 mmol/L      Chloride 85 98 - 107 mmol/L      CO2 20.1 22.0 - 29.0 mmol/L      Calcium 10.5 8.6 - 10.5 mg/dL      Total Protein 9.7 6.0 - 8.5 g/dL      Albumin 5.6 3.5 - 5.2 g/dL      ALT (SGPT) 17 1 - 33 U/L      AST (SGOT) 22 1 - 32 U/L      Alkaline Phosphatase 75 39 - 117 U/L      Total Bilirubin 0.8 0.0 - 1.2 mg/dL      Globulin 4.1 gm/dL      A/G Ratio 1.4 g/dL      BUN/Creatinine Ratio 9.1 7.0 - 25.0      Anion Gap 28.9 5.0 - 15.0 mmol/L      eGFR 9.6 >60.0 mL/min/1.73      Comment: <15 Indicative of kidney failure       Narrative:      GFR Normal >60  Chronic Kidney Disease <60  Kidney Failure <15      Lipase [[id number]]  (Normal) Collected: 01/21/24 1615    Order Status: Completed Specimen: Blood from Arm, Right Updated: 01/21/24 1645     Lipase 58 13 - 60 U/L     CBC Auto Differential [[id number]]  (Abnormal) Collected: 01/21/24 1615    Order Status: Completed Specimen: Blood from Arm, Right Updated: 01/21/24 1632     WBC 22.91 3.40 - 10.80 10*3/mm3      RBC 5.67 3.77 - 5.28 10*6/mm3      Hemoglobin 15.2 12.0 - 15.9 g/dL      Hematocrit  47.3 34.0 - 46.6 %      MCV 83.4 79.0 - 97.0 fL      MCH 26.8 26.6 - 33.0 pg      MCHC 32.1 31.5 - 35.7 g/dL      RDW 14.2 12.3 - 15.4 %      RDW-SD 43.6 37.0 - 54.0 fl      MPV 9.8 6.0 - 12.0 fL      Platelets 465 140 - 450 10*3/mm3      Neutrophil % 88.3 42.7 - 76.0 %      Lymphocyte % 5.5 19.6 - 45.3 %      Monocyte % 5.9 5.0 - 12.0 %      Eosinophil % 0.0 0.3 - 6.2 %      Basophil % 0.0 0.0 - 1.5 %      Immature Grans % 0.3 0.0 - 0.5 %      Neutrophils, Absolute 20.18 1.70 - 7.00 10*3/mm3      Lymphocytes, Absolute 1.27 0.70 - 3.10 10*3/mm3      Monocytes, Absolute 1.36 0.10 - 0.90 10*3/mm3      Eosinophils, Absolute 0.01 0.00 - 0.40 10*3/mm3      Basophils, Absolute 0.01 0.00 - 0.20 10*3/mm3      Immature Grans, Absolute 0.08 0.00 - 0.05 10*3/mm3     Comprehensive Metabolic Panel [[id number]]     Order Status: Canceled Specimen: Blood     Lipase [[id number]]     Order Status: Canceled Specimen: Blood     Urinalysis With Microscopic If Indicated (No Culture) - Urine, Clean Catch [[id number]]     Order Status: Sent Specimen: Urine, Clean Catch     hCG, Serum, Qualitative [[id number]]     Order Status: Canceled Specimen: Blood     CBC Auto Differential [[id number]]     Order Status: Canceled Specimen: Blood              EKG:   No orders to display       Meds given in ED:   Medications   lactated ringers bolus 1,000 mL (1,000 mL Intravenous New Bag 1/21/24 1721)   sodium chloride 0.9 % bolus 1,000 mL (0 mL Intravenous Stopped 1/21/24 1720)   ondansetron (ZOFRAN) injection 4 mg (4 mg Intravenous Given 1/21/24 1619)   potassium chloride (K-DUR,KLOR-CON) CR tablet 40 mEq (40 mEq Oral Given 1/21/24 1723)   pantoprazole (PROTONIX) injection 40 mg (40 mg Intravenous Given 1/21/24 1723)       Imaging results:  No radiology results for the last day    Ambulatory status:   - UP WITHOUT ASSISTANCE    Social issues:   Social History     Socioeconomic History    Marital status:    Tobacco Use    Smoking status: Former      Packs/day: 0.50     Years: 15.00     Additional pack years: 0.00     Total pack years: 7.50     Types: Cigarettes     Start date:      Quit date: 2008     Years since quittin.0     Passive exposure: Past    Smokeless tobacco: Former   Vaping Use    Vaping Use: Never used   Substance and Sexual Activity    Alcohol use: Not Currently     Comment: SOCIAL, weekends    Drug use: No    Sexual activity: Defer       NIH Stroke Scale:         Emilie Hurtado RN  24 17:31 EST

## 2024-01-22 ENCOUNTER — READMISSION MANAGEMENT (OUTPATIENT)
Dept: CALL CENTER | Facility: HOSPITAL | Age: 49
End: 2024-01-22
Payer: COMMERCIAL

## 2024-01-22 ENCOUNTER — APPOINTMENT (OUTPATIENT)
Dept: ULTRASOUND IMAGING | Facility: HOSPITAL | Age: 49
End: 2024-01-22
Payer: COMMERCIAL

## 2024-01-22 VITALS
DIASTOLIC BLOOD PRESSURE: 73 MMHG | HEIGHT: 67 IN | SYSTOLIC BLOOD PRESSURE: 122 MMHG | WEIGHT: 186.95 LBS | OXYGEN SATURATION: 98 % | BODY MASS INDEX: 29.34 KG/M2 | TEMPERATURE: 97.9 F | RESPIRATION RATE: 18 BRPM | HEART RATE: 74 BPM

## 2024-01-22 LAB
ALBUMIN SERPL-MCNC: 4.1 G/DL (ref 3.5–5.2)
ANION GAP SERPL CALCULATED.3IONS-SCNC: 11.3 MMOL/L (ref 5–15)
ANION GAP SERPL CALCULATED.3IONS-SCNC: 8.3 MMOL/L (ref 5–15)
BASOPHILS # BLD AUTO: 0.03 10*3/MM3 (ref 0–0.2)
BASOPHILS NFR BLD AUTO: 0.2 % (ref 0–1.5)
BUN SERPL-MCNC: 30 MG/DL (ref 6–20)
BUN SERPL-MCNC: 42 MG/DL (ref 6–20)
BUN/CREAT SERPL: 22.2 (ref 7–25)
BUN/CREAT SERPL: 27.5 (ref 7–25)
CALCIUM SPEC-SCNC: 8.2 MG/DL (ref 8.6–10.5)
CALCIUM SPEC-SCNC: 8.5 MG/DL (ref 8.6–10.5)
CHLORIDE SERPL-SCNC: 100 MMOL/L (ref 98–107)
CHLORIDE SERPL-SCNC: 102 MMOL/L (ref 98–107)
CK SERPL-CCNC: 364 U/L (ref 20–180)
CO2 SERPL-SCNC: 26.7 MMOL/L (ref 22–29)
CO2 SERPL-SCNC: 27.7 MMOL/L (ref 22–29)
CREAT SERPL-MCNC: 1.09 MG/DL (ref 0.57–1)
CREAT SERPL-MCNC: 1.89 MG/DL (ref 0.57–1)
CREAT UR-MCNC: 345.3 MG/DL
DEPRECATED RDW RBC AUTO: 45 FL (ref 37–54)
EGFRCR SERPLBLD CKD-EPI 2021: 32.8 ML/MIN/1.73
EGFRCR SERPLBLD CKD-EPI 2021: 63.6 ML/MIN/1.73
EOSINOPHIL # BLD AUTO: 0.02 10*3/MM3 (ref 0–0.4)
EOSINOPHIL NFR BLD AUTO: 0.1 % (ref 0.3–6.2)
ERYTHROCYTE [DISTWIDTH] IN BLOOD BY AUTOMATED COUNT: 14.7 % (ref 12.3–15.4)
GLUCOSE BLDC GLUCOMTR-MCNC: 86 MG/DL (ref 70–130)
GLUCOSE BLDC GLUCOMTR-MCNC: 95 MG/DL (ref 70–130)
GLUCOSE SERPL-MCNC: 94 MG/DL (ref 65–99)
GLUCOSE SERPL-MCNC: 96 MG/DL (ref 65–99)
HCT VFR BLD AUTO: 35.8 % (ref 34–46.6)
HGB BLD-MCNC: 11.7 G/DL (ref 12–15.9)
IMM GRANULOCYTES # BLD AUTO: 0.14 10*3/MM3 (ref 0–0.05)
IMM GRANULOCYTES NFR BLD AUTO: 0.9 % (ref 0–0.5)
LYMPHOCYTES # BLD AUTO: 1.82 10*3/MM3 (ref 0.7–3.1)
LYMPHOCYTES NFR BLD AUTO: 11.2 % (ref 19.6–45.3)
MAGNESIUM SERPL-MCNC: 2.1 MG/DL (ref 1.6–2.6)
MCH RBC QN AUTO: 27.8 PG (ref 26.6–33)
MCHC RBC AUTO-ENTMCNC: 32.7 G/DL (ref 31.5–35.7)
MCV RBC AUTO: 85 FL (ref 79–97)
MONOCYTES # BLD AUTO: 1.98 10*3/MM3 (ref 0.1–0.9)
MONOCYTES NFR BLD AUTO: 12.2 % (ref 5–12)
NEUTROPHILS NFR BLD AUTO: 12.3 10*3/MM3 (ref 1.7–7)
NEUTROPHILS NFR BLD AUTO: 75.4 % (ref 42.7–76)
NRBC BLD AUTO-RTO: 0 /100 WBC (ref 0–0.2)
PHOSPHATE SERPL-MCNC: 3.6 MG/DL (ref 2.5–4.5)
PLATELET # BLD AUTO: 315 10*3/MM3 (ref 140–450)
PMV BLD AUTO: 10.1 FL (ref 6–12)
POTASSIUM SERPL-SCNC: 3.3 MMOL/L (ref 3.5–5.2)
POTASSIUM SERPL-SCNC: 3.6 MMOL/L (ref 3.5–5.2)
RBC # BLD AUTO: 4.21 10*6/MM3 (ref 3.77–5.28)
SODIUM SERPL-SCNC: 138 MMOL/L (ref 136–145)
SODIUM SERPL-SCNC: 138 MMOL/L (ref 136–145)
WBC NRBC COR # BLD AUTO: 16.29 10*3/MM3 (ref 3.4–10.8)

## 2024-01-22 PROCEDURE — 25810000003 SODIUM CHLORIDE 0.9 % SOLUTION: Performed by: INTERNAL MEDICINE

## 2024-01-22 PROCEDURE — 99238 HOSP IP/OBS DSCHRG MGMT 30/<: CPT | Performed by: HOSPITALIST

## 2024-01-22 PROCEDURE — 83735 ASSAY OF MAGNESIUM: CPT | Performed by: INTERNAL MEDICINE

## 2024-01-22 PROCEDURE — 82948 REAGENT STRIP/BLOOD GLUCOSE: CPT

## 2024-01-22 PROCEDURE — 80048 BASIC METABOLIC PNL TOTAL CA: CPT | Performed by: HOSPITALIST

## 2024-01-22 PROCEDURE — 80069 RENAL FUNCTION PANEL: CPT | Performed by: INTERNAL MEDICINE

## 2024-01-22 PROCEDURE — 76775 US EXAM ABDO BACK WALL LIM: CPT

## 2024-01-22 PROCEDURE — 82550 ASSAY OF CK (CPK): CPT | Performed by: INTERNAL MEDICINE

## 2024-01-22 PROCEDURE — 85025 COMPLETE CBC W/AUTO DIFF WBC: CPT | Performed by: INTERNAL MEDICINE

## 2024-01-22 RX ORDER — CLONAZEPAM 0.5 MG/1
0.5 TABLET ORAL 2 TIMES DAILY PRN
Status: DISCONTINUED | OUTPATIENT
Start: 2024-01-22 | End: 2024-01-22 | Stop reason: HOSPADM

## 2024-01-22 RX ADMIN — SODIUM CHLORIDE 150 ML/HR: 9 INJECTION, SOLUTION INTRAVENOUS at 07:55

## 2024-01-22 RX ADMIN — ACETAMINOPHEN 650 MG: 325 TABLET ORAL at 05:42

## 2024-01-22 RX ADMIN — SODIUM CHLORIDE 150 ML/HR: 9 INJECTION, SOLUTION INTRAVENOUS at 00:59

## 2024-01-22 RX ADMIN — ACETAMINOPHEN 650 MG: 325 TABLET ORAL at 11:31

## 2024-01-22 RX ADMIN — Medication 10 ML: at 07:58

## 2024-01-22 RX ADMIN — PANTOPRAZOLE SODIUM 40 MG: 40 INJECTION, POWDER, FOR SOLUTION INTRAVENOUS at 05:39

## 2024-01-22 NOTE — PLAN OF CARE
Goal Outcome Evaluation:  Plan of Care Reviewed With: patient         Patient making good amount of f/c.

## 2024-01-22 NOTE — PLAN OF CARE
Problem: Adult Inpatient Plan of Care  Goal: Plan of Care Review  Outcome: Adequate for Care Transition  Goal: Patient-Specific Goal (Individualized)  Outcome: Adequate for Care Transition  Goal: Absence of Hospital-Acquired Illness or Injury  Outcome: Adequate for Care Transition  Goal: Optimal Comfort and Wellbeing  Outcome: Adequate for Care Transition  Goal: Readiness for Transition of Care  Outcome: Adequate for Care Transition     Problem: Fluid and Electrolyte Imbalance (Acute Kidney Injury/Impairment)  Goal: Fluid and Electrolyte Balance  Outcome: Adequate for Care Transition     Problem: Oral Intake Inadequate (Acute Kidney Injury/Impairment)  Goal: Optimal Nutrition Intake  Outcome: Adequate for Care Transition     Problem: Renal Function Impairment (Acute Kidney Injury/Impairment)  Goal: Effective Renal Function  Outcome: Adequate for Care Transition   Goal Outcome Evaluation:

## 2024-01-22 NOTE — DISCHARGE INSTR - APPOINTMENTS
Patient has an appointment already scheduled with Dr. Adalberto Livingston on February 1 @ [facility] [phone]

## 2024-01-22 NOTE — CASE MANAGEMENT/SOCIAL WORK
Discharge Planning Assessment  PAVEL Toth     Patient Name: Radha Barry  MRN: [id number]  Today's Date: 1/22/2024    Admit Date: 1/21/2024    Plan: Home   Discharge Needs Assessment       Row Name 01/22/24 1327       Living Environment    People in Home child(chioma), dependent    Name(s) of People in Home children ages 17 and 14    Current Living Arrangements home    Duration at Residence 11 Y    Potentially Unsafe Housing Conditions none    In the past 12 months has the electric, gas, oil, or water company threatened to shut off services in your home? No    Primary Care Provided by self    Provides Primary Care For child(chioma)    Caregiving Concerns pt voiced no discharge needs at this time.    Family Caregiver if Needed child(chioma), adult;parent(s)    Family Caregiver Names daughter Ita and her dad    Quality of Family Relationships unable to assess    Able to Return to Prior Arrangements yes    Living Arrangement Comments Patient states she lives with her two children ages 17 and 14 in a single story house with a basement and 9 steps to gain entry       Resource/Environmental Concerns    Resource/Environmental Concerns none    Transportation Concerns none       Transportation Needs    In the past 12 months, has lack of transportation kept you from medical appointments or from getting medications? no    In the past 12 months, has lack of transportation kept you from meetings, work, or from getting things needed for daily living? No       Food Insecurity    Within the past 12 months, you worried that your food would run out before you got the money to buy more. Never true    Within the past 12 months, the food you bought just didn't last and you didn't have money to get more. Never true       Transition Planning    Patient/Family Anticipates Transition to home with family    Patient/Family Anticipated Services at Transition none    Transportation Anticipated family or friend will provide  pt states either  her daughter or dad will be able to provide ride home at discharge       Discharge Needs Assessment    Readmission Within the Last 30 Days no previous admission in last 30 days    Current Outpatient/Agency/Support Group --  none    Equipment Currently Used at Home none    Concerns to be Addressed denies needs/concerns at this time;adjustment to diagnosis/illness;discharge planning    Concerns Comments pt voiced no discharge needs at this time.    Anticipated Changes Related to Illness none    Equipment Needed After Discharge none    Outpatient/Agency/Support Group Needs --  pt declines the need for services    Discharge Facility/Level of Care Needs --  pt declined the need for services    Provided Post Acute Provider List? Refused    Patient's Choice of Community Agency(s) none    Current Discharge Risk --  none    Discharge Coordination/Progress Patient states she plans on returning home at discharge with her family to help if needed and voiced no discharge needs at this time.                   Discharge Plan       Row Name 01/22/24 0409       Plan    Plan Home    Patient/Family in Agreement with Plan yes    Plan Comments 0930am, into room and introduced self and role of CM. Discussed discharge disposition with patient at bedside. Patient is currently resting in bed and voiced no complaints. Patient confirms that the info on her face sheet is correct and she see's Dr. Adalberto Livingston as PCP. She states she normally uses [facility] in [place] and currently has no problem picking up or paying for her med's. She also states she does not have a living will and declines information regarding one. Patient states she lives with her two children in a single story house with a basement and nine steps to gain entry and normally has no issues entering the home or maneuvering inside. She states she is independent with her ADL's, works and drives and either her daughter or dad will be able to provide ride home at  discharge. She also states she does not currently use any DME at home and does not anticipate needing any other equipment at discharge. Patient states she has not used home health in the past and states she will not need this service at discharge and declined the need for any other services at this time. Patient states she plans on returning home at discharge with her family to help as needed and voiced no discharge needs at this time. She had no other questions or concerns regarding discharge plans. Name and number placed on white board in room. CM will follow.                  Continued Care and Services - Admitted Since 1/21/2024    Coordination has not been started for this encounter.       Expected Discharge Date and Time       Expected Discharge Date Expected Discharge Time    Jan 22, 2024            Demographic Summary       Row Name 01/22/24 1348       General Information    Admission Type inpatient    Arrived From home    Referral Source admission list    Reason for Consult discharge planning    Preferred Language English       Contact Information    Permission Granted to Share Info With       Row Name 01/22/24 1327       General Information    Admission Type inpatient    Arrived From home    Referral Source admission list    Reason for Consult discharge planning    Preferred Language English       Contact Information    Permission Granted to Share Info With                    Functional Status    No documentation.                  Psychosocial    No documentation.                  Abuse/Neglect    No documentation.                  Legal    No documentation.                  Substance Abuse    No documentation.                  Patient Forms    No documentation.                     Renetta Maynard RN

## 2024-01-22 NOTE — DISCHARGE SUMMARY
Radha Barry  1975  [id number]    Hospitalists Discharge Summary    Date of Admission: 1/21/2024  Date of Discharge:  1/22/2024    Primary Discharge Diagnoses:  JAZMÍN due to nausea and vomiting  Nausea and vomiting due to   Hypokalemia  Leukocytosis    History of Present Illness (taken from H&P):  Radha Barry is a 48 y.o. female with a past medical history of anxiety and insomnia. She reports she was started on Wegovy on Friday and approximately 2 hours after taking her first dose she developed nausea and started vomiting multiple times per day. Patient reports she was started on the 1.7mg dose instead of starting on the lower 0.25mg dose. Patient has not had anything to eat since Thursday and has had decreased urine output, but is still urinating occasionally. Patient reports she came in today, because she was weak, dizzy, and symptoms were not improving.   In the ED, patient was found to have a Cr of 5.2, discussed with Dr. Tapia and will admit the patient for further hydration.     Hospital Course:  Ms. Barry was admitted to the Med/Surg unit and continued on IVF.  Biochemical markers improved.  Leukocytosis improved w/ hydration and was likely hemoconcentrated as all three cell lines decreased.  Her diet was advanced and tolerated so I stopped her IVF.  Repeat chemistry panel showed continued improvement in renal function.    PCP  Patient Care Team:  Adalberto Livingston MD as PCP - General    Consults:   Consults       No orders found for last 30 day(s).            Operations and Procedures Performed:       US Renal Bilateral    Result Date: 1/22/2024  Narrative: BILATERAL RENAL ULTRASOUND, 1/22/2024  HISTORY: 48-year-old female hospital inpatient with nausea, vomiting and dehydration. Abnormal renal function testing.  TECHNIQUE: Grayscale ultrasound imaging of both kidneys and the urinary bladder was performed.  FINDINGS: Both kidneys are normal in size and ultrasound appearance. No evidence of  urinary obstruction. No visible nephrolithiasis, renal mass, perinephric fluid collection or renal parenchymal scarring.  Incompletely filled urinary bladder is grossly negative. There is no bladder distention.  Right renal length: 11.1 cm. Left renal length: 12.2 cm.      Impression: Negative bilateral renal ultrasound examination.  This report was finalized on 1/22/2024 9:55 AM by Dr. Rafael Nickerson MD.       Allergies:  is allergic to penicillins.    Jacob  reviewed    Discharge Medications:     Discharge Medications        Continue These Medications        Instructions Start Date   clonazePAM 0.5 MG tablet  Commonly known as: KlonoPIN   1 p.o. twice daily as needed anxiety.      ondansetron 4 MG tablet  Commonly known as: ZOFRAN   4 mg, Oral, Every 8 Hours PRN      zolpidem 10 MG tablet  Commonly known as: AMBIEN   10 mg, Oral, Nightly PRN             Stop These Medications      Semaglutide-Weight Management 1.7 MG/0.75ML solution auto-injector              Last Lab Results:   Lab Results (most recent)       Procedure Component Value Units Date/Time    Basic Metabolic Panel [[id number]]  (Abnormal) Collected: 01/22/24 1254    Specimen: Blood Updated: 01/22/24 1321     Glucose 94 mg/dL      BUN 30 mg/dL      Creatinine 1.09 mg/dL      Sodium 138 mmol/L      Potassium 3.3 mmol/L      Chloride 102 mmol/L      CO2 27.7 mmol/L      Calcium 8.2 mg/dL      BUN/Creatinine Ratio 27.5     Anion Gap 8.3 mmol/L      eGFR 63.6 mL/min/1.73     Narrative:      GFR Normal >60  Chronic Kidney Disease <60  Kidney Failure <15      POC Glucose Once [[id number]]  (Normal) Collected: 01/22/24 1203    Specimen: Blood Updated: 01/22/24 1211     Glucose 86 mg/dL     Creatinine Urine Random (kidney function) GFR component - Urine, Clean Catch [[id number]] Collected: 01/21/24 1858    Specimen: Urine, Clean Catch Updated: 01/22/24 1044     Creatinine, Urine 345.3 mg/dL     Narrative:      Reference intervals for random urine have not been  established.  Clinical usage is dependent upon physician's interpretation in combination with other laboratory tests.       POC Glucose Once [[id number]]  (Normal) Collected: 01/22/24 0748    Specimen: Blood Updated: 01/22/24 0756     Glucose 95 mg/dL     Renal Function Panel [[id number]]  (Abnormal) Collected: 01/22/24 0347    Specimen: Blood Updated: 01/22/24 0448     Glucose 96 mg/dL      BUN 42 mg/dL      Creatinine 1.89 mg/dL      Sodium 138 mmol/L      Potassium 3.6 mmol/L      Chloride 100 mmol/L      CO2 26.7 mmol/L      Calcium 8.5 mg/dL      Albumin 4.1 g/dL      Phosphorus 3.6 mg/dL      Anion Gap 11.3 mmol/L      BUN/Creatinine Ratio 22.2     eGFR 32.8 mL/min/1.73     Narrative:      GFR Normal >60  Chronic Kidney Disease <60  Kidney Failure <15      Magnesium [[id number]]  (Normal) Collected: 01/22/24 0347    Specimen: Blood Updated: 01/22/24 0445     Magnesium 2.1 mg/dL     CK [[id number]]  (Abnormal) Collected: 01/22/24 0347    Specimen: Blood Updated: 01/22/24 0445     Creatine Kinase 364 U/L     CBC & Differential [[id number]]  (Abnormal) Collected: 01/22/24 0347    Specimen: Blood Updated: 01/22/24 0422    Narrative:      The following orders were created for panel order CBC & Differential.  Procedure                               Abnormality         Status                     ---------                               -----------         ------                     CBC Auto Differential[[id number]]        Abnormal            Final result                 Please view results for these tests on the individual orders.    CBC Auto Differential [[id number]]  (Abnormal) Collected: 01/22/24 0347    Specimen: Blood Updated: 01/22/24 0422     WBC 16.29 10*3/mm3      RBC 4.21 10*6/mm3      Hemoglobin 11.7 g/dL      Hematocrit 35.8 %      MCV 85.0 fL      MCH 27.8 pg      MCHC 32.7 g/dL      RDW 14.7 %      RDW-SD 45.0 fl      MPV 10.1 fL      Platelets 315 10*3/mm3      Neutrophil % 75.4 %      Lymphocyte % 11.2 %       Monocyte % 12.2 %      Eosinophil % 0.1 %      Basophil % 0.2 %      Immature Grans % 0.9 %      Neutrophils, Absolute 12.30 10*3/mm3      Lymphocytes, Absolute 1.82 10*3/mm3      Monocytes, Absolute 1.98 10*3/mm3      Eosinophils, Absolute 0.02 10*3/mm3      Basophils, Absolute 0.03 10*3/mm3      Immature Grans, Absolute 0.14 10*3/mm3      nRBC 0.0 /100 WBC     Sodium, Urine, Random - Urine, Clean Catch [[id number]] Collected: 01/21/24 1858    Specimen: Urine, Clean Catch Updated: 01/21/24 1918     Sodium, Urine 53 mmol/L     Narrative:      Reference intervals for random urine have not been established.  Clinical usage is dependent upon physician's interpretation in combination with other laboratory tests.       Urinalysis, Microscopic Only - Urine, Clean Catch [[id number]]  (Abnormal) Collected: 01/21/24 1858    Specimen: Urine, Clean Catch Updated: 01/21/24 1911     RBC, UA 11-20 /HPF      WBC, UA None Seen /HPF      Bacteria, UA Trace /HPF      Squamous Epithelial Cells, UA Too Numerous to Count /HPF      Hyaline Casts, UA None Seen /LPF      Fine Granular Casts, UA 0-2 /LPF      Methodology Manual Light Microscopy    Urinalysis With Microscopic If Indicated (No Culture) - Urine, Clean Catch [[id number]]  (Abnormal) Collected: 01/21/24 1858    Specimen: Urine, Clean Catch Updated: 01/21/24 1908     Color, UA Yellow     Appearance, UA Slightly Cloudy     pH, UA <=5.0     Specific Gravity, UA 1.031     Comment: Result obtained by Refractometer        Glucose, UA Negative     Ketones, UA Trace     Bilirubin, UA Moderate (2+)     Blood, UA Moderate (2+)     Protein,  mg/dL (2+)     Leuk Esterase, UA Negative     Nitrite, UA Negative     Urobilinogen, UA 0.2 E.U./dL    Magnesium [[id number]]  (Normal) Collected: 01/21/24 1615    Specimen: Blood from Arm, Right Updated: 01/21/24 1818     Magnesium 2.1 mg/dL     CK [[id number]]  (Abnormal) Collected: 01/21/24 1615    Specimen: Blood from Arm, Right Updated: 01/21/24  1754     Creatine Kinase 277 U/L     Whick Draw [[id number]] Collected: 01/21/24 1615    Specimen: Blood from Arm, Right Updated: 01/21/24 1717    Narrative:      The following orders were created for panel order Whick Draw.  Procedure                               Abnormality         Status                     ---------                               -----------         ------                     Green Top (Gel)[[id number]]                                  Final result               Lavender Top[[id number]]                                     Final result               Gold Top - SST[[id number]]                                   Final result               Light Blue Top[[id number]]                                   Final result                 Please view results for these tests on the individual orders.    Light Blue Top [[id number]] Collected: 01/21/24 1615    Specimen: Blood from Arm, Right Updated: 01/21/24 1717     Extra Tube Hold for add-ons.     Comment: Auto resulted       Green Top (Gel) [[id number]] Collected: 01/21/24 1615    Specimen: Blood from Arm, Right Updated: 01/21/24 1717     Extra Tube Hold for add-ons.     Comment: Auto resulted.       Lavender Top [[id number]] Collected: 01/21/24 1615    Specimen: Blood from Arm, Right Updated: 01/21/24 1717     Extra Tube hold for add-on     Comment: Auto resulted       Gold Top - SST [[id number]] Collected: 01/21/24 1615    Specimen: Blood from Arm, Right Updated: 01/21/24 1717     Extra Tube Hold for add-ons.     Comment: Auto resulted.       hCG, Serum, Qualitative [[id number]]  (Normal) Collected: 01/21/24 1615    Specimen: Blood from Arm, Right Updated: 01/21/24 1648     HCG Qualitative Negative    Comprehensive Metabolic Panel [[id number]]  (Abnormal) Collected: 01/21/24 1615    Specimen: Blood from Arm, Right Updated: 01/21/24 1645     Glucose 168 mg/dL      BUN 48 mg/dL      Creatinine 5.26 mg/dL      Sodium 134 mmol/L      Potassium 3.1 mmol/L      Chloride  85 mmol/L      CO2 20.1 mmol/L      Calcium 10.5 mg/dL      Total Protein 9.7 g/dL      Albumin 5.6 g/dL      ALT (SGPT) 17 U/L      AST (SGOT) 22 U/L      Alkaline Phosphatase 75 U/L      Total Bilirubin 0.8 mg/dL      Globulin 4.1 gm/dL      A/G Ratio 1.4 g/dL      BUN/Creatinine Ratio 9.1     Anion Gap 28.9 mmol/L      eGFR 9.6 mL/min/1.73      Comment: <15 Indicative of kidney failure       Narrative:      GFR Normal >60  Chronic Kidney Disease <60  Kidney Failure <15      Lipase [[id number]]  (Normal) Collected: 01/21/24 1615    Specimen: Blood from Arm, Right Updated: 01/21/24 1645     Lipase 58 U/L     CBC & Differential [[id number]]  (Abnormal) Collected: 01/21/24 1615    Specimen: Blood from Arm, Right Updated: 01/21/24 1632    Narrative:      The following orders were created for panel order CBC & Differential.  Procedure                               Abnormality         Status                     ---------                               -----------         ------                     CBC Auto Differential[[id number]]        Abnormal            Final result                 Please view results for these tests on the individual orders.    CBC Auto Differential [[id number]]  (Abnormal) Collected: 01/21/24 1615    Specimen: Blood from Arm, Right Updated: 01/21/24 1632     WBC 22.91 10*3/mm3      RBC 5.67 10*6/mm3      Hemoglobin 15.2 g/dL      Hematocrit 47.3 %      MCV 83.4 fL      MCH 26.8 pg      MCHC 32.1 g/dL      RDW 14.2 %      RDW-SD 43.6 fl      MPV 9.8 fL      Platelets 465 10*3/mm3      Neutrophil % 88.3 %      Lymphocyte % 5.5 %      Monocyte % 5.9 %      Eosinophil % 0.0 %      Basophil % 0.0 %      Immature Grans % 0.3 %      Neutrophils, Absolute 20.18 10*3/mm3      Lymphocytes, Absolute 1.27 10*3/mm3      Monocytes, Absolute 1.36 10*3/mm3      Eosinophils, Absolute 0.01 10*3/mm3      Basophils, Absolute 0.01 10*3/mm3      Immature Grans, Absolute 0.08 10*3/mm3           Imaging Results (Most Recent)        Procedure Component Value Units Date/Time    US Renal Bilateral [[id number]] Collected: 01/22/24 0954     Updated: 01/22/24 0957    Narrative:      BILATERAL RENAL ULTRASOUND, 1/22/2024     HISTORY:  48-year-old female hospital inpatient with nausea, vomiting and  dehydration. Abnormal renal function testing.     TECHNIQUE:  Grayscale ultrasound imaging of both kidneys and the urinary bladder was  performed.     FINDINGS:  Both kidneys are normal in size and ultrasound appearance. No evidence  of urinary obstruction. No visible nephrolithiasis, renal mass,  perinephric fluid collection or renal parenchymal scarring.     Incompletely filled urinary bladder is grossly negative. There is no  bladder distention.     Right renal length: 11.1 cm.  Left renal length: 12.2 cm.       Impression:      Negative bilateral renal ultrasound examination.     This report was finalized on 1/22/2024 9:55 AM by Dr. Rafael Nickerson MD.               PROCEDURES      Condition on Discharge: Stable    Physical Exam at Discharge  Vital Signs  Temp:  [97.7 °F (36.5 °C)-98.7 °F (37.1 °C)] 97.9 °F (36.6 °C)  Heart Rate:  [] 74  Resp:  [18] 18  BP: (122-142)/(66-93) 122/73    Physical Exam:  Physical Exam   Constitutional: Patient appears well-developed and well-nourished and in no acute distress   Cardiovascular: Regular rate, regular rhythm, S1 normal and S2 normal.  Exam reveals no gallop and no friction rub.  No murmur heard.  Pulmonary/Chest: Lungs are clear to auscultation bilaterally. No respiratory distress. No wheezes. No rhonchi. No rales.   Abdominal: Soft. Bowel sounds are normal. There is no tenderness.   Musculoskeletal: Normal Muscle tone  Extremities: No edema.   Neurological: Patient is alert and oriented to person, place, and time. Cranial nerves II-XII are grossly intact with no focal deficits.  Skin: Skin is warm. No rash noted. Nails show no clubbing.  No cyanosis or erythema.    Discharge  Disposition  Home    Visiting Nurse:    No     Home PT/OT:  No     Home Safety Evaluation:  No    DME  None    Discharge Diet:      Dietary Orders (From admission, onward)       Start     Ordered    01/22/24 1126  Diet: Regular/House Diet, Gastrointestinal Diets; Fat-Restricted; Texture: Regular Texture (IDDSI 7); Fluid Consistency: Thin (IDDSI 0)  Diet Effective Now        References:    Diet Order [facility]   Question Answer Comment   Diets: Regular/House Diet    Diets: Gastrointestinal Diets    Gastrointestinal Diet: Fat-Restricted    Texture: Regular Texture (IDDSI 7)    Fluid Consistency: Thin (IDDSI 0)        01/22/24 1126                    Activity at Discharge:  As tolerated      Follow-up Appointments  Future Appointments   Date Time Provider Department Center   1/26/2024  9:45 AM [facility]   2/1/2024  2:00 PM Adalberto Livingston MD [facility]     Additional Instructions for the Follow-ups that You Need to Schedule       Discharge Follow-up with PCP   As directed       Currently Documented PCP:    Adalberto Livingston MD    PCP Phone Number:    [phone]     Follow Up Details: As scheduled                Test Results Pending at Discharge  None     Aquiles Schmid MD  01/22/24  13:40 EST    Time: <30 minutes

## 2024-01-22 NOTE — CASE MANAGEMENT/SOCIAL WORK
Case Management Discharge Note      Final Note: dc home    Provided Post Acute Provider List?: Refused    Selected Continued Care - Discharged on 1/22/2024 Admission date: 1/21/2024 - Discharge disposition: Home or Self Care      Destination    No services have been selected for the patient.                Durable Medical Equipment    No services have been selected for the patient.                Dialysis/Infusion    No services have been selected for the patient.                Home Medical Care    No services have been selected for the patient.                Therapy    No services have been selected for the patient.                Community Resources    No services have been selected for the patient.                Community & DME    No services have been selected for the patient.                         Final Discharge Disposition Code: 01 - home or self-care

## 2024-01-22 NOTE — PAYOR COMM NOTE
[facility]    &  [facility]  [address]     [address] [place] [address]     [place] [address]  NPI: [id number]     NPI: [id number]  Tax ID: [id number]     Tax ID: [id number]    Lucien Epstein - [phone]  Utilization Review/Room Reservations  Phone: [phone], [phone], [phone], Laura [phone], Jamila [phone] or [phone]  Fax: [phone]  Email: [email or web address]  Please call, fax back, or email with authorization or any questions! Thanks!      REQUESTED CLINICAL  AUTH#[id number]  FAX#[phone]        This fax contains any of the following:  Face Sheet, H&P, progress notes, consults, orders, meds, lab results, labor record, vitals, delivery worksheet, op note, d/c summary.  The information contained in this fax is confidential for the use of the Individual or entity named above. If the reader of this message is not the Intended recipient (or the employee or agent responsible to deliver it to the Intended recipient), you are hereby notified that any dissemination, distribution, or copy of this communication is prohibited. If you have received this communication in error, please notify us by collect telephone call and return the original message to us at the above address at our expense.  Radha Barry (48 y.o. Female)       Date of Birth   1975    Social Security Number       Address   [address] [facility] [address] [address]    Home Phone   [phone]    MRN   [id number]       Mormon   None    Marital Status                               Admission Date   1/21/24    Admission Type   Emergency    Admitting Provider   Valery Ba DO    Attending Provider   Valery Ba DO    Department, Room/Bed   [facility] MED SURG, 1419/1       Discharge Date       Discharge Disposition       Discharge Destination                                  "Attending Provider: Valery Ba DO    Allergies: Penicillins    Isolation: None   Infection: None   Code Status: CPR    Ht: 170.2 cm (67\")   Wt: 84.8 kg (186 lb 15.2 oz)    Admission Cmt: None   Principal Problem: Acute renal failure [N17.9]                   Active Insurance as of 1/21/2024       Primary Coverage       Payor Plan Insurance Group Employer/Plan Group    [employer] [id number]       Payor Plan Address Payor Plan Phone Number Payor Plan Fax Number Effective Dates    [address] [phone]  1/1/2011 - None Entered    [facility] [id number]         Subscriber Name Subscriber Birth Date Member ID       PATRICIA GONG 1975 [id number]                     Emergency Contacts        (Rel.) Home Phone Work Phone Mobile Phone    Yovani Gong (Spouse) [phone]                 History & Physical        Valery Ba DO at 01/21/24 1750          [facility] HOSPITALIST     Adalberto Livingston MD    CHIEF COMPLAINT:     Abdominal pain/Nausea/vomiting    HISTORY OF PRESENT ILLNESS:  Patricia Gong is a 48 y.o. female with a past medical history of anxiety and insomnia. She reports she was started on Wegovy on Friday and approximately 2 hours after taking her first dose she developed nausea and started vomiting multiple times per day. Patient reports she was started on the 1.7mg dose instead of starting on the lower 0.25mg dose. Patient has not had anything to eat since Thursday and has had decreased urine output, but is still urinating occasionally. Patient reports she came in today, because she was weak, dizzy, and symptoms were not improving.   In the ED, patient was found to have a Cr of 5.2, discussed with Dr. Tapia and will admit the patient for further hydration.       Past Medical History:   Diagnosis Date    Anxiety     Endometrial polyp     GERD (gastroesophageal reflux disease)     Insomnia     Ovarian cyst     PONV " (postoperative nausea and vomiting)      Past Surgical History:   Procedure Laterality Date    BREAST CYST EXCISION      BREAST SURGERY       SECTION      CHOLECYSTECTOMY      COLONOSCOPY N/A 2019    Procedure: COLONOSCOPY TO CECUM AND INTO TI WITH COLD BIOSPIES AND HOT SNARE POLYPECTOMIES WITH RESOLUTION CLIP X1;  Surgeon: Jordana Cruz MD;  Location: [facility] ENDOSCOPY;  Service: Gastroenterology    D & C HYSTEROSCOPY N/A 2019    Procedure: HYSTERSCOPE DILATATION AND CURETTAGE MYOSURE AND NOVASURE ABLATION;  Surgeon: Abimbola Dumont MD;  Location: [facility] OR [facility];  Service: Obstetrics/Gynecology    GALLBLADDER SURGERY      TUBAL ABDOMINAL LIGATION      WISDOM TOOTH EXTRACTION       Family History   Problem Relation Age of Onset    Migraines Mother     Diverticulitis Mother     Malig Hyperthermia Neg Hx      Social History     Tobacco Use    Smoking status: Former     Packs/day: 0.50     Years: 15.00     Additional pack years: 0.00     Total pack years: 7.50     Types: Cigarettes     Start date:      Quit date: 2008     Years since quittin.0     Passive exposure: Past    Smokeless tobacco: Former   Vaping Use    Vaping Use: Never used   Substance Use Topics    Alcohol use: Not Currently     Comment: SOCIAL, weekends    Drug use: No     (Not in a hospital admission)    Allergies:  Penicillins    Immunization History   Administered Date(s) Administered    COVID-19 (MODERNA) 1st,2nd,3rd Dose Monovalent 2021, 2021, 2021    Flu Vaccine Intradermal Quad 18-64YR 2018           REVIEW OF SYSTEMS:    Please see the above history of present illness for pertinent positives and negatives.  The remainder of the patient's systems have been reviewed and are negative.     Vital Signs  Temp:  [98 °F (36.7 °C)] 98 °F (36.7 °C)  Heart Rate:  [] 102  Resp:  [18] 18  BP: (123-142)/(84-93) 142/86    Oxygen Therapy  SpO2: 99 %}    Body mass index is 29.51 kg/m².    Flowsheet  "Rows      Flowsheet Row First Filed Value   Admission Height 170 cm (66.93\") Documented at 01/21/2024 1605   Admission Weight 85.3 kg (188 lb) Documented at 01/21/2024 1605                 Physical Exam:    Physical Exam  Vitals reviewed.   Constitutional:       General: She is not in acute distress.     Appearance: She is overweight.   HENT:      Head: Normocephalic and atraumatic.   Cardiovascular:      Rate and Rhythm: Regular rhythm. Tachycardia present.      Heart sounds: No murmur heard.  Pulmonary:      Effort: Pulmonary effort is normal. No respiratory distress.      Breath sounds: No wheezing or rales.   Abdominal:      General: Bowel sounds are normal. There is no distension.      Palpations: Abdomen is soft.      Tenderness: There is no abdominal tenderness.   Musculoskeletal:      Right lower leg: No edema.      Left lower leg: No edema.   Skin:     General: Skin is warm and dry.   Neurological:      Mental Status: She is alert.   Psychiatric:         Mood and Affect: Mood normal.         Behavior: Behavior normal.           Emotional Behavior: wnl   Judgment and Insight:    Mental Status:    Alertness:   Memory:     Mood and Affect:         Depression                 Anxiety      Debilities: none   Physical Weakness:   Handicaps:     Disabilities:     Agitation:        Results Review:    I reviewed the patient's new clinical results.  Lab Results (most recent)       Procedure Component Value Units Date/Time    CK [[id number]] Collected: 01/21/24 1615    Specimen: Blood from Arm, Right Updated: 01/21/24 1741    [place] Draw [[id number]] Collected: 01/21/24 1615    Specimen: Blood from Arm, Right Updated: 01/21/24 1717    Narrative:      The following orders were created for panel order [place] Draw.  Procedure                               Abnormality         Status                     ---------                               -----------         ------                     Green Top (Gel)[[id number]]          "                         Final result               Lavender Top[[id number]]                                     Final result               Gold Top - SST[[id number]]                                   Final result               Light Blue Top[[id number]]                                   Final result                 Please view results for these tests on the individual orders.    Light Blue Top [[id number]] Collected: 01/21/24 1615    Specimen: Blood from Arm, Right Updated: 01/21/24 1717     Extra Tube Hold for add-ons.     Comment: Auto resulted       Green Top (Gel) [[id number]] Collected: 01/21/24 1615    Specimen: Blood from Arm, Right Updated: 01/21/24 1717     Extra Tube Hold for add-ons.     Comment: Auto resulted.       Lavender Top [[id number]] Collected: 01/21/24 1615    Specimen: Blood from Arm, Right Updated: 01/21/24 1717     Extra Tube hold for add-on     Comment: Auto resulted       Gold Top - SST [[id number]] Collected: 01/21/24 1615    Specimen: Blood from Arm, Right Updated: 01/21/24 1717     Extra Tube Hold for add-ons.     Comment: Auto resulted.       hCG, Serum, Qualitative [[id number]]  (Normal) Collected: 01/21/24 1615    Specimen: Blood from Arm, Right Updated: 01/21/24 1648     HCG Qualitative Negative    Comprehensive Metabolic Panel [[id number]]  (Abnormal) Collected: 01/21/24 1615    Specimen: Blood from Arm, Right Updated: 01/21/24 1645     Glucose 168 mg/dL      BUN 48 mg/dL      Creatinine 5.26 mg/dL      Sodium 134 mmol/L      Potassium 3.1 mmol/L      Chloride 85 mmol/L      CO2 20.1 mmol/L      Calcium 10.5 mg/dL      Total Protein 9.7 g/dL      Albumin 5.6 g/dL      ALT (SGPT) 17 U/L      AST (SGOT) 22 U/L      Alkaline Phosphatase 75 U/L      Total Bilirubin 0.8 mg/dL      Globulin 4.1 gm/dL      A/G Ratio 1.4 g/dL      BUN/Creatinine Ratio 9.1     Anion Gap 28.9 mmol/L      eGFR 9.6 mL/min/1.73      Comment: <15 Indicative of kidney failure       Narrative:      GFR Normal >60  Chronic  Kidney Disease <60  Kidney Failure <15      Lipase [[id number]]  (Normal) Collected: 01/21/24 1615    Specimen: Blood from Arm, Right Updated: 01/21/24 1645     Lipase 58 U/L     CBC & Differential [[id number]]  (Abnormal) Collected: 01/21/24 1615    Specimen: Blood from Arm, Right Updated: 01/21/24 1632    Narrative:      The following orders were created for panel order CBC & Differential.  Procedure                               Abnormality         Status                     ---------                               -----------         ------                     CBC Auto Differential[[id number]]        Abnormal            Final result                 Please view results for these tests on the individual orders.    CBC Auto Differential [[id number]]  (Abnormal) Collected: 01/21/24 1615    Specimen: Blood from Arm, Right Updated: 01/21/24 1632     WBC 22.91 10*3/mm3      RBC 5.67 10*6/mm3      Hemoglobin 15.2 g/dL      Hematocrit 47.3 %      MCV 83.4 fL      MCH 26.8 pg      MCHC 32.1 g/dL      RDW 14.2 %      RDW-SD 43.6 fl      MPV 9.8 fL      Platelets 465 10*3/mm3      Neutrophil % 88.3 %      Lymphocyte % 5.5 %      Monocyte % 5.9 %      Eosinophil % 0.0 %      Basophil % 0.0 %      Immature Grans % 0.3 %      Neutrophils, Absolute 20.18 10*3/mm3      Lymphocytes, Absolute 1.27 10*3/mm3      Monocytes, Absolute 1.36 10*3/mm3      Eosinophils, Absolute 0.01 10*3/mm3      Basophils, Absolute 0.01 10*3/mm3      Immature Grans, Absolute 0.08 10*3/mm3             Imaging Results (Most Recent)       None          ECG/EMG Results (most recent)       None              Assessment & Plan  Active Hospital Problems    Diagnosis  POA    **Acute renal failure [N17.9]  Yes      Resolved Hospital Problems   No resolved problems to display.       Acute Renal Failure  - likely d/t volume depletion   - patient was given 2L bolus in the ED  - continue IV fluids at 150cc/hr  - Urine studies pending  - check renal ultrasound   - repeat  in am    Mild rhabdomyolysis  - Ck 277  - continue IV fluids   - repeat in am    Hypokalemia  - likely d/t n/v  - given 40meq in the ED   - check magnesium  - will repeat in am     N/V/Abdominal pain  - likely side effect of Wegovy  - lipase wnl   - HCG negative  - supportive care with IV fluids, antiemetics  - clear liquid diet    Leukocytosis  - likely reactive  - afebrile  - continue to monitor closely     Hyperglycemia  - likely reactive  - monitor with routine accu-checks    Anxiety/Insomnia  - holding home meds given significant renal failure     DVT Prophylaxis  - SCDs    Code Status  - Full Code    Patient is high risk given acute renal failure     I discussed the patient's findings and my recommendations with patient.          Valery Ba DO  01/21/24  17:50 EST        Note disclaimer: At [facility], we believe that sharing information builds trust and better relationships. You are receiving this note because you recently visited [facility]. It is possible you will see health information before a provider has talked with you about it. This kind of information can be easy to misunderstand. To help you fully understand what it means for your health, we urge you to discuss this note with your provider.        Electronically signed by Valery Ba DO at 01/21/24 1807          Emergency Department Notes        Emilie Hurtado, RN at 01/21/24 1749          Gave phone report to Mike SORIANO.     Electronically signed by Emilie Hurtado, RN at 01/21/24 1751       Emilie Hurtado, RN at 01/21/24 1731          Nursing report ED to floor  Radha Barry  48 y.o.  female    HPI :   Chief Complaint   Patient presents with    Vomiting       Admitting doctor:   Valery Ba DO    Admitting diagnosis:   The primary encounter diagnosis was Acute renal failure, unspecified acute renal failure type. Diagnoses of Hypokalemia, Hyponatremia, and Medication reaction, initial encounter were also pertinent  "to this visit.    Code status:   Current Code Status       Date Active Code Status Order ID Comments User Context       Not on file            Allergies:   Penicillins    Isolation:   No active isolations    Intake and Output  No intake or output data in the 24 hours ending 01/21/24 1731    Weight:       01/21/24  1605   Weight: 85.3 kg (188 lb)       Most recent vitals:   Vitals:    01/21/24 1605 01/21/24 1630 01/21/24 1721   BP: 123/84  136/93   Pulse: 105 96 90   Resp: 18  18   Temp: 98 °F (36.7 °C)     SpO2: 95% 97% 99%   Weight: 85.3 kg (188 lb)     Height: 170 cm (66.93\")         Active LDAs/IV Access:   Lines, Drains & Airways       Active LDAs       Name Placement date Placement time Site Days    Peripheral IV 01/21/24 1614 Right Antecubital 01/21/24  1614  Antecubital  less than 1                    Labs (abnormal labs have a star):   Labs Reviewed   COMPREHENSIVE METABOLIC PANEL - Abnormal; Notable for the following components:       Result Value    Glucose 168 (*)     BUN 48 (*)     Creatinine 5.26 (*)     Sodium 134 (*)     Potassium 3.1 (*)     Chloride 85 (*)     CO2 20.1 (*)     Total Protein 9.7 (*)     Albumin 5.6 (*)     Anion Gap 28.9 (*)     eGFR 9.6 (*)     All other components within normal limits    Narrative:     GFR Normal >60  Chronic Kidney Disease <60  Kidney Failure <15     CBC WITH AUTO DIFFERENTIAL - Abnormal; Notable for the following components:    WBC 22.91 (*)     RBC 5.67 (*)     Hematocrit 47.3 (*)     Platelets 465 (*)     Neutrophil % 88.3 (*)     Lymphocyte % 5.5 (*)     Eosinophil % 0.0 (*)     Neutrophils, Absolute 20.18 (*)     Monocytes, Absolute 1.36 (*)     Immature Grans, Absolute 0.08 (*)     All other components within normal limits   HCG, SERUM, QUALITATIVE - Normal   LIPASE - Normal   RAINBOW DRAW    Narrative:     The following orders were created for panel order [place] Draw.  Procedure                               Abnormality         Status                   "   ---------                               -----------         ------                     Green Top (Gel)[[id number]]                                  Final result               Lavender Top[[id number]]                                     Final result               Gold Top - SST[[id number]]                                   Final result               Light Blue Top[[id number]]                                   Final result                 Please view results for these tests on the individual orders.   URINALYSIS W/ MICROSCOPIC IF INDICATED (NO CULTURE)   GREEN TOP   LAVENDER TOP   GOLD TOP - SST   LIGHT BLUE TOP   CBC AND DIFFERENTIAL    Narrative:     The following orders were created for panel order CBC & Differential.  Procedure                               Abnormality         Status                     ---------                               -----------         ------                     CBC Auto Differential[[id number]]        Abnormal            Final result                 Please view results for these tests on the individual orders.       Results       Procedure Component Value Ref Range Date/Time    [place] Draw [[id number]] Collected: 01/21/24 1615    Order Status: Completed Specimen: Blood from Arm, Right Updated: 01/21/24 1715    Narrative:      The following orders were created for panel order [place] Draw.  Procedure                               Abnormality         Status                     ---------                               -----------         ------                     Green Top (Gel)[[id number]]                                  Final result               Lavender Top[[id number]]                                     Final result               Gold Top - SST[[id number]]                                   Final result               Light Blue Top[[id number]]                                   Final result                 Please view results for these tests on the individual orders.    hCG, Serum,  Qualitative [[id number]]  (Normal) Collected: 01/21/24 1615    Order Status: Completed Specimen: Blood from Arm, Right Updated: 01/21/24 1648     HCG Qualitative Negative Negative     Comprehensive Metabolic Panel [[id number]]  (Abnormal) Collected: 01/21/24 1615    Order Status: Completed Specimen: Blood from Arm, Right Updated: 01/21/24 1645     Glucose 168 65 - 99 mg/dL      BUN 48 6 - 20 mg/dL      Creatinine 5.26 0.57 - 1.00 mg/dL      Sodium 134 136 - 145 mmol/L      Potassium 3.1 3.5 - 5.2 mmol/L      Chloride 85 98 - 107 mmol/L      CO2 20.1 22.0 - 29.0 mmol/L      Calcium 10.5 8.6 - 10.5 mg/dL      Total Protein 9.7 6.0 - 8.5 g/dL      Albumin 5.6 3.5 - 5.2 g/dL      ALT (SGPT) 17 1 - 33 U/L      AST (SGOT) 22 1 - 32 U/L      Alkaline Phosphatase 75 39 - 117 U/L      Total Bilirubin 0.8 0.0 - 1.2 mg/dL      Globulin 4.1 gm/dL      A/G Ratio 1.4 g/dL      BUN/Creatinine Ratio 9.1 7.0 - 25.0      Anion Gap 28.9 5.0 - 15.0 mmol/L      eGFR 9.6 >60.0 mL/min/1.73      Comment: <15 Indicative of kidney failure       Narrative:      GFR Normal >60  Chronic Kidney Disease <60  Kidney Failure <15      Lipase [[id number]]  (Normal) Collected: 01/21/24 1615    Order Status: Completed Specimen: Blood from Arm, Right Updated: 01/21/24 1645     Lipase 58 13 - 60 U/L     CBC Auto Differential [[id number]]  (Abnormal) Collected: 01/21/24 1615    Order Status: Completed Specimen: Blood from Arm, Right Updated: 01/21/24 1632     WBC 22.91 3.40 - 10.80 10*3/mm3      RBC 5.67 3.77 - 5.28 10*6/mm3      Hemoglobin 15.2 12.0 - 15.9 g/dL      Hematocrit 47.3 34.0 - 46.6 %      MCV 83.4 79.0 - 97.0 fL      MCH 26.8 26.6 - 33.0 pg      MCHC 32.1 31.5 - 35.7 g/dL      RDW 14.2 12.3 - 15.4 %      RDW-SD 43.6 37.0 - 54.0 fl      MPV 9.8 6.0 - 12.0 fL      Platelets 465 140 - 450 10*3/mm3      Neutrophil % 88.3 42.7 - 76.0 %      Lymphocyte % 5.5 19.6 - 45.3 %      Monocyte % 5.9 5.0 - 12.0 %      Eosinophil % 0.0 0.3 - 6.2 %      Basophil  % 0.0 0.0 - 1.5 %      Immature Grans % 0.3 0.0 - 0.5 %      Neutrophils, Absolute 20.18 1.70 - 7.00 10*3/mm3      Lymphocytes, Absolute 1.27 0.70 - 3.10 10*3/mm3      Monocytes, Absolute 1.36 0.10 - 0.90 10*3/mm3      Eosinophils, Absolute 0.01 0.00 - 0.40 10*3/mm3      Basophils, Absolute 0.01 0.00 - 0.20 10*3/mm3      Immature Grans, Absolute 0.08 0.00 - 0.05 10*3/mm3     Comprehensive Metabolic Panel [[id number]]     Order Status: Canceled Specimen: Blood     Lipase [[id number]]     Order Status: Canceled Specimen: Blood     Urinalysis With Microscopic If Indicated (No Culture) - Urine, Clean Catch [[id number]]     Order Status: Sent Specimen: Urine, Clean Catch     hCG, Serum, Qualitative [[id number]]     Order Status: Canceled Specimen: Blood     CBC Auto Differential [[id number]]     Order Status: Canceled Specimen: Blood              EKG:   No orders to display       Meds given in ED:   Medications   lactated ringers bolus 1,000 mL (1,000 mL Intravenous New Bag 24 1721)   sodium chloride 0.9 % bolus 1,000 mL (0 mL Intravenous Stopped 24 1720)   ondansetron (ZOFRAN) injection 4 mg (4 mg Intravenous Given 24 1619)   potassium chloride (K-DUR,KLOR-CON) CR tablet 40 mEq (40 mEq Oral Given 24 1723)   pantoprazole (PROTONIX) injection 40 mg (40 mg Intravenous Given 24 1723)       Imaging results:  No radiology results for the last day    Ambulatory status:   - UP WITHOUT ASSISTANCE    Social issues:   Social History     Socioeconomic History    Marital status:    Tobacco Use    Smoking status: Former     Packs/day: 0.50     Years: 15.00     Additional pack years: 0.00     Total pack years: 7.50     Types: Cigarettes     Start date:      Quit date: 2008     Years since quittin.0     Passive exposure: Past    Smokeless tobacco: Former   Vaping Use    Vaping Use: Never used   Substance and Sexual Activity    Alcohol use: Not Currently     Comment: SOCIAL, weekends    Drug use:  No    Sexual activity: Defer       NIH Stroke Scale:         Emilie Hurtado RN  01/21/24 17:31 EST     Electronically signed by Emilie Hurtado RN at 01/21/24 1731       Emilie Hurtado RN at 01/21/24 1704          Patient reports not urinating for the last 3 days due to dehydration.     Electronically signed by Emilie Hurtado RN at 01/21/24 1705       Deric Tapia MD at 01/21/24 0720          Subjective   History of Present Illness  48-year-old female presents emergency room complaint of abdominal pain nausea and vomiting.  Patient states she started taking Wegovy 2 days ago and after her first dose which she states was larger than the starting dose should be she started having symptoms of abdominal pain nausea and vomiting and anorexia.  She has not eaten since Thursday evening 3 days ago and has had continued nausea and vomiting since 2 days ago when she took the pills.  Patient states she is never had this happen to her before as she has never been on Wegovy before.  Patient denies headache visual changes fever neck pain jaw pain cough shortness of breath diaphoresis chest pain diarrhea or dysuria.      Review of Systems   Constitutional:  Positive for activity change and appetite change. Negative for chills, diaphoresis, fatigue and fever.   HENT:  Negative for congestion, rhinorrhea and sore throat.    Eyes:  Negative for photophobia and visual disturbance.   Respiratory:  Negative for cough and shortness of breath.    Cardiovascular:  Negative for chest pain, palpitations and leg swelling.   Gastrointestinal:  Positive for abdominal pain, nausea and vomiting. Negative for abdominal distention and diarrhea.   Genitourinary:  Negative for dysuria and flank pain.   Musculoskeletal:  Negative for arthralgias and back pain.   Skin:  Negative for rash.   Neurological:  Negative for dizziness, weakness and headaches.   Psychiatric/Behavioral:  Negative for agitation and behavioral problems.        Past Medical  History:   Diagnosis Date    Anxiety     Endometrial polyp     GERD (gastroesophageal reflux disease)     Insomnia     Ovarian cyst     PONV (postoperative nausea and vomiting)        Allergies   Allergen Reactions    Penicillins Anaphylaxis       Past Surgical History:   Procedure Laterality Date    BREAST CYST EXCISION      BREAST SURGERY       SECTION      CHOLECYSTECTOMY      COLONOSCOPY N/A 2019    Procedure: COLONOSCOPY TO CECUM AND INTO TI WITH COLD BIOSPIES AND HOT SNARE POLYPECTOMIES WITH RESOLUTION CLIP X1;  Surgeon: Jordana Cruz MD;  Location: [facility] ENDOSCOPY;  Service: Gastroenterology    D & C HYSTEROSCOPY N/A 2019    Procedure: HYSTERSCOPE DILATATION AND CURETTAGE MYOSURE AND NOVASURE ABLATION;  Surgeon: Abimbola Dumont MD;  [place]  [facility] [place] [facility] [place] [facility] [place]  Service: Obstetrics/Gynecology    GALLBLADDER SURGERY      TUBAL ABDOMINAL LIGATION      WISDOM TOOTH EXTRACTION         Family History   Problem Relation Age of Onset    Migraines Mother     Diverticulitis Mother     Malig Hyperthermia Neg Hx        Social History     Socioeconomic History    Marital status:    Tobacco Use    Smoking status: Former     Packs/day: 0.50     Years: 15.00     Additional pack years: 0.00     Total pack years: 7.50     Types: Cigarettes     Start date:      Quit date: 2008     Years since quittin.0     Passive exposure: Past    Smokeless tobacco: Former   Vaping Use    Vaping Use: Never used   Substance and Sexual Activity    Alcohol use: Not Currently     Comment: SOCIAL, weekends    Drug use: No    Sexual activity: Defer           Objective   Physical Exam  Vitals and nursing note reviewed.   Constitutional:       General: She is not in acute distress.     Appearance: Normal appearance. She is not ill-appearing, toxic-appearing or diaphoretic.      Comments: 48-year-old female in no acute distress   HENT:      Head: Normocephalic and atraumatic.      Nose: Nose normal.       Mouth/Throat:      Mouth: Mucous membranes are moist.   Eyes:      Conjunctiva/sclera: Conjunctivae normal.   Cardiovascular:      Rate and Rhythm: Normal rate.      Pulses: Normal pulses.   Pulmonary:      Effort: Pulmonary effort is normal.   Abdominal:      General: Abdomen is flat. There is no distension.      Tenderness: There is abdominal tenderness. There is no right CVA tenderness, left CVA tenderness, guarding or rebound.      Comments: Patient has some mild epigastric tenderness to palpation   Musculoskeletal:         General: No swelling. Normal range of motion.      Cervical back: Normal range of motion and neck supple.   Skin:     General: Skin is warm and dry.   Neurological:      General: No focal deficit present.      Mental Status: She is alert.   Psychiatric:         Mood and Affect: Mood normal.         Procedures          ED Course  ED Course as of 01/21/24 1714   Sun Jan 21, 2024   1700 Patient's lab work is markedly abnormal reflecting hemoconcentration and acute renal failure along with mild hypokalemia and mild hyponatremia.  Patient's white blood cell count is 23,000 which is most likely reactive. []   1710 Spoke with hospitalist Dr. Urban who accepted patient to hospitalist service. []      ED Course User Index  [] Deric Tapia MD                                             Medical Decision Making  My differential diagnosis for abdominal pain includes but is not limited to:  Gastritis, gastroenteritis, peptic ulcer disease, GERD, esophageal perforation, acute appendicitis, mesenteric adenitis, Meckel's diverticulum, epiploic appendagitis, diverticulitis, colon cancer, ulcerative colitis, Crohn's disease, intussusception, small bowel obstruction, adhesions, ischemic bowel, perforated viscus, ileus, obstipation, biliary colic, cholecystitis, cholelithiasis, Genaro-Saravanan Evan, hepatitis, pancreatitis, common bile duct obstruction, cholangitis, bile leak, splenic trauma, splenic  rupture, splenic infarction, splenic abscess, abdominal wall hematoma, abdominal wall abscess, intra-abdominal abscess, ascites, spontaneous bacterial peritonitis, hernia, UTI, cystitis, prostatitis, ureterolithiasis, urinary obstruction, AAA, myocardial infarction, pneumonia, cancer, porphyria, DKA, medications, sickle cell, viral syndrome, zoster     Amount and/or Complexity of Data Reviewed  Labs: ordered. Decision-making details documented in ED Course.    Risk  Prescription drug management.        Final diagnoses:   Acute renal failure, unspecified acute renal failure type   Hypokalemia   Hyponatremia   Medication reaction, initial encounter       ED Disposition  ED Disposition       ED Disposition   Decision to Admit    Condition   --    Comment   Level of Care: Med/Surg [1]   Diagnosis: Acute renal failure [896518]   Admitting Physician: RUT DOWNEY [[id number]]   Attending Physician: ARUNA TAPIA [[id number]]   Certification: I Certify That Inpatient Hospital Services Are Medically Necessary For Greater Than 2 Midnights                 No follow-up provider specified.       Medication List      No changes were made to your prescriptions during this visit.            Aruna Tapia MD  01/21/24 1714      Electronically signed by Aruna Tapia MD at 01/21/24 1714       Orders (all)        Start     Ordered    01/22/24 0757  POC Glucose Once  PROCEDURE ONCE        Comments: Complete no more than 45 minutes prior to patient eating      01/22/24 0748    01/22/24 0632  clonazePAM (KlonoPIN) tablet 0.5 mg  2 Times Daily PRN         01/22/24 0632    01/22/24 0600  Renal Function Panel  Morning Draw         01/21/24 1808    01/22/24 0600  CBC & Differential  Morning Draw         01/21/24 1808    01/22/24 0600  Magnesium  Morning Draw         01/21/24 1808    01/22/24 0600  CK  Morning Draw         01/21/24 1808    01/22/24 0600  CBC Auto Differential  PROCEDURE ONCE         01/21/24 3522     01/21/24 2231  POC Glucose Once  PROCEDURE ONCE        Comments: Complete no more than 45 minutes prior to patient eating      01/21/24 2224    01/21/24 2200  POC Glucose Finger 4x Daily Before Meals & at Bedtime  4 Times Daily Before Meals & at Bedtime      Comments: Complete no more than 45 minutes prior to patient eating      01/21/24 1805    01/21/24 2100  sodium chloride 0.9 % flush 10 mL  Every 12 Hours Scheduled         01/21/24 1827    01/21/24 2000  Vital Signs  Every 4 Hours       01/21/24 1827    01/21/24 1915  sodium chloride 0.9 % infusion  Continuous         01/21/24 1827    01/21/24 1915  pantoprazole (PROTONIX) injection 40 mg  Every Early Morning         01/21/24 1827    01/21/24 1909  Urinalysis, Microscopic Only - Urine, Clean Catch  Once         01/21/24 1908    01/21/24 1854  zolpidem (AMBIEN) tablet 5 mg  Nightly PRN         01/21/24 1854    01/21/24 1853  clonazePAM (KlonoPIN) disintegrating tablet 0.5 mg  2 Times Daily PRN,   Status:  Discontinued         01/21/24 1854    01/21/24 1828  Intake & Output  Every Shift       01/21/24 1827    01/21/24 1828  Weigh Patient  Once         01/21/24 1827    01/21/24 1828  Insert Peripheral IV  Once         01/21/24 1827    01/21/24 1828  Saline Lock & Maintain IV Access  Continuous,   Status:  Canceled         01/21/24 1827    01/21/24 1828  Place Sequential Compression Device  Once         01/21/24 1827    01/21/24 1828  Maintain Sequential Compression Device  Continuous         01/21/24 1827    01/21/24 1828  Continuous Cardiac Monitoring  Continuous        Comments: Follow Standing Orders As Outlined in Process Instructions (Open Order Report to View Full Instructions)    01/21/24 1827    01/21/24 1828  Telemetry - Maintain IV Access  Continuous         01/21/24 1827    01/21/24 1828  Telemetry - Place Orders & Notify Provider of Results When Patient Experiences Acute Chest Pain, Dysrhythmia or Respiratory Distress  Until Discontinued          01/21/24 1827 01/21/24 1828  May Be Off Telemetry for Tests  Continuous         01/21/24 1827    01/21/24 1828  Activity - Ad Kathy  Until Discontinued         01/21/24 1827    01/21/24 1828  Notify Provider (With Default Parameters)  Until Discontinued         01/21/24 1827 01/21/24 1828  Diet: Liquid Diets; Clear Liquid; Fluid Consistency: Thin (IDDSI 0)  Diet Effective Now         01/21/24 1827    01/21/24 1828  Bowel Regimen Not Indicated  Once         01/21/24 1827    01/21/24 1827  sodium chloride 0.9 % flush 10 mL  As Needed         01/21/24 1827    01/21/24 1827  sodium chloride 0.9 % infusion 40 mL  As Needed         01/21/24 1827 01/21/24 1827  nitroglycerin (NITROSTAT) SL tablet 0.4 mg  Every 5 Minutes PRN         01/21/24 1827    01/21/24 1827  acetaminophen (TYLENOL) tablet 650 mg  Every 4 Hours PRN        See Hyperspace for full Linked Orders Report.    01/21/24 1827    01/21/24 1827  acetaminophen (TYLENOL) 160 MG/5ML oral solution 650 mg  Every 4 Hours PRN        See Hyperspace for full Linked Orders Report.    01/21/24 1827    01/21/24 1827  acetaminophen (TYLENOL) suppository 650 mg  Every 4 Hours PRN        See Hyperspace for full Linked Orders Report.    01/21/24 1827    01/21/24 1827  melatonin tablet 5 mg  Nightly PRN         01/21/24 1827    01/21/24 1827  ondansetron ODT (ZOFRAN-ODT) disintegrating tablet 4 mg  Every 6 Hours PRN        See Hyperspace for full Linked Orders Report.    01/21/24 1827    01/21/24 1827  ondansetron (ZOFRAN) injection 4 mg  Every 6 Hours PRN        See Hyperspace for full Linked Orders Report.    01/21/24 1827    01/21/24 1805  Magnesium  Once         01/21/24 1804 01/21/24 1757  US Renal Bilateral  1 Time Imaging         01/21/24 1756    01/21/24 1757  Sodium, Urine, Random - Urine, Clean Catch  Once         01/21/24 1756    01/21/24 1757  Creatinine Urine Random (kidney function) GFR component - Urine, Clean Catch  Once         01/21/24 1756 01/21/24  1749  Code Status and Medical Interventions:  Continuous         01/21/24 1750    01/21/24 1735  CK  Once         01/21/24 1734    01/21/24 1729  potassium chloride (K-DUR,KLOR-CON) CR tablet 40 mEq  Once         01/21/24 1713    01/21/24 1729  pantoprazole (PROTONIX) injection 40 mg  Once         01/21/24 1713    01/21/24 1716  lactated ringers bolus 1,000 mL  Once         01/21/24 1700    01/21/24 1715  Inpatient Admission  Once         01/21/24 1714    01/21/24 1632  sodium chloride 0.9 % bolus 500 mL  Once,   Status:  Discontinued         01/21/24 1616    01/21/24 1632  ondansetron (ZOFRAN) injection 4 mg  Once         01/21/24 1616    01/21/24 1631  sodium chloride 0.9 % bolus 1,000 mL  Once         01/21/24 1615    01/21/24 1629  CBC Auto Differential  Once         01/21/24 1628    01/21/24 1628  CBC & Differential  Once         01/21/24 1627    01/21/24 1628  Comprehensive Metabolic Panel  Once         01/21/24 1627    01/21/24 1628  hCG, Serum, Qualitative  STAT         01/21/24 1627    01/21/24 1628  Lipase  Once         01/21/24 1627    01/21/24 1616  CBC & Differential  Once,   Status:  Canceled         01/21/24 1616    01/21/24 1616  Comprehensive Metabolic Panel  Once,   Status:  Canceled         01/21/24 1616    01/21/24 1616  Lipase  Once,   Status:  Canceled         01/21/24 1616    01/21/24 1616  Urinalysis With Microscopic If Indicated (No Culture) - Urine, Clean Catch  Once         01/21/24 1616    01/21/24 1616  hCG, Serum, Qualitative  STAT,   Status:  Canceled         01/21/24 1616    01/21/24 1616  CBC Auto Differential  PROCEDURE ONCE,   Status:  Canceled         01/21/24 1616    01/21/24 1615  [place] Draw  Once         01/21/24 1615    01/21/24 1615  Green Top (Gel)  PROCEDURE ONCE         01/21/24 1615    01/21/24 1615  Lavender Top  PROCEDURE ONCE         01/21/24 1615    01/21/24 1615  Gold Top - SST  PROCEDURE ONCE         01/21/24 1615    01/21/24 1615  Light Blue Top  PROCEDURE ONCE          01/21/24 8963

## 2024-01-22 NOTE — OUTREACH NOTE
Prep Survey      Flowsheet Row Responses   [facility] patient discharged from? [place]   Is LACE score < 7 ? Yes   Eligibility [facility]   Date of Admission 01/21/24   Date of Discharge 01/22/24   Discharge Disposition Home or Self Care   Discharge diagnosis Abdominal pain/Nausea/vomiting  Acute renal failure   Does the patient have one of the following disease processes/diagnoses(primary or secondary)? Other   Does the patient have Home health ordered? No   Is there a DME ordered? No   Prep survey completed? Yes            JEFF MARIA - Registered Nurse

## 2024-01-23 ENCOUNTER — TRANSITIONAL CARE MANAGEMENT TELEPHONE ENCOUNTER (OUTPATIENT)
Dept: CALL CENTER | Facility: HOSPITAL | Age: 49
End: 2024-01-23
Payer: COMMERCIAL

## 2024-01-23 DIAGNOSIS — N17.9 ACUTE RENAL FAILURE, UNSPECIFIED ACUTE RENAL FAILURE TYPE: Primary | ICD-10-CM

## 2024-01-23 NOTE — OUTREACH NOTE
Call Center TCM Note      Flowsheet Row Responses   [facility] patient discharged from? [place]   Does the patient have one of the following disease processes/diagnoses(primary or secondary)? Other   TCM attempt successful? Yes   Call start time 1451   Call end time 1456   Discharge diagnosis Abdominal pain/Nausea/vomiting  Acute renal failure   Meds reviewed with patient/caregiver? Yes   Does the patient have all medications ordered at discharge? N/A   Comments Pt is already sched with PCP office 01/26 for labs prior to 02/01/2024 3 mth ov with Dr Livingston. This appt date is within TCM APPT time frame, and PCP did not have any other times I could access to sched true TCM APPT by 02/05/2024.   Does the patient have an appointment with their PCP within 7-14 days of discharge? Yes   Has home health visited the patient within 72 hours of discharge? N/A   Psychosocial issues? No   Did the patient receive a copy of their discharge instructions? Yes   Nursing interventions Reviewed instructions with patient   What is the patient's perception of their health status since discharge? Improving   Is the patient/caregiver able to teach back signs and symptoms related to disease process for when to call PCP? Yes   Is the patient/caregiver able to teach back signs and symptoms related to disease process for when to call 911? Yes   Is the patient/caregiver able to teach back the hierarchy of who to call/visit for symptoms/problems? PCP, Specialist, Home health nurse, Urgent Care, ED, 911 Yes   If the patient is a current smoker, are they able to teach back resources for cessation? Not a smoker   TCM call completed? Yes   Wrap up additional comments D/C DX: Abdominal pain/Nausea/vomiting Acute renal failure - Pt is feeling better, very tired. She is mostly taking in liquids, soups, lots of water. Dry food difficult as yet. She is going to try some BOOST or similar drinks. Semaglutide-Weight Mgmt 1.7MG/0.75ML has been  discontinued. Pt is already sched with PCP office 01/26 for labs prior to 02/01/2024 3 mth ov with Dr Livingston. This appt date is within TCM APPT time frame, and PCP did not have any other times I could access to sched true TCM APPT by 02/05/2024.   Call end time [id number]            Ramila Werner MA    1/23/2024, 15:01 EST

## 2024-01-25 NOTE — PAYOR COMM NOTE
[facility]    &  [facility]  [address]     [address] [place] [address]     [place] [address]  NPI: [id number]     NPI: [id number]  Tax ID: [id number]     Tax ID: [id number]    Lucien Epstein - [phone]  Utilization Review/Room Reservations  Phone: Lkoiu-[phone], [phone], [phone], Laura [phone], Jamila [phone] or [phone]  Fax: [phone]  Email: [email or web address]  Please call, fax back, or email with authorization or any questions! Thanks!    D/C SUMMARY  AUTH#IP [id number]           This fax contains any of the following:  Face Sheet, H&P, progress notes, consults, orders, meds, lab results, labor record, vitals, delivery worksheet, op note, d/c summary.  The information contained in this fax is confidential for the use of the Individual or entity named above. If the reader of this message is not the Intended recipient (or the employee or agent responsible to deliver it to the Intended recipient), you are hereby notified that any dissemination, distribution, or copy of this communication is prohibited. If you have received this communication in error, please notify us by collect telephone call and return the original message to us at the above address at our expense.  Chris Barryley (48 y.o. Female)       Date of Birth   1975    Social Security Number       Address   [address] [id number]    Home Phone   [phone]    MRN   [id number]       Holiness   None    Marital Status                               Admission Date   1/21/24    Admission Type   Emergency    Admitting Provider   Valery Ba DO    Attending Provider       Department, Room/Bed   [facility] MED SURG, 1419/1       Discharge Date   1/22/2024    Discharge Disposition   Home or Self Care    Discharge Destination                                 Attending Provider: (none)  "  Allergies: Penicillins    Isolation: None   Infection: None   Code Status: Prior    Ht: 170.2 cm (67\")   Wt: 84.8 kg (186 lb 15.2 oz)    Admission Cmt: None   Principal Problem: Acute renal failure [N17.9]                   Active Insurance as of 1/21/2024       Primary Coverage       Payor Plan Insurance Group Employer/Plan Group    [id number]       Payor Plan Address Payor Plan Phone Number Payor Plan Fax Number Effective Dates    [address] [phone]  1/1/2011 - None Entered    [facility] [id number]         Subscriber Name Subscriber Birth Date Member ID       PATRICIA GONG 1975 [id number]                     Emergency Contacts        (Rel.) Home Phone Work Phone Mobile Phone    Yovani Gong (Spouse) [phone]                 Discharge Summary        Aquiles Schmid MD at 01/22/24 1340          Patricia Gong  1975  [id number]    Hospitalists Discharge Summary    Date of Admission: 1/21/2024  Date of Discharge:  1/22/2024    Primary Discharge Diagnoses:  JAZMÍN due to nausea and vomiting  Nausea and vomiting due to   Hypokalemia  Leukocytosis    History of Present Illness (taken from H&P):  Patricia Gong is a 48 y.o. female with a past medical history of anxiety and insomnia. She reports she was started on Wegovy on Friday and approximately 2 hours after taking her first dose she developed nausea and started vomiting multiple times per day. Patient reports she was started on the 1.7mg dose instead of starting on the lower 0.25mg dose. Patient has not had anything to eat since Thursday and has had decreased urine output, but is still urinating occasionally. Patient reports she came in today, because she was weak, dizzy, and symptoms were not improving.   In the ED, patient was found to have a Cr of 5.2, discussed with Dr. Tapia and will admit the patient for further hydration.     Hospital Course:  Ms. Gong was admitted to the " Med/Surg unit and continued on IVF.  Biochemical markers improved.  Leukocytosis improved w/ hydration and was likely hemoconcentrated as all three cell lines decreased.  Her diet was advanced and tolerated so I stopped her IVF.  Repeat chemistry panel showed continued improvement in renal function.    PCP  Patient Care Team:  Adalberto Livingston MD as PCP - General    Consults:   Consults       No orders found for last 30 day(s).            Operations and Procedures Performed:       US Renal Bilateral    Result Date: 1/22/2024  Narrative: BILATERAL RENAL ULTRASOUND, 1/22/2024  HISTORY: 48-year-old female hospital inpatient with nausea, vomiting and dehydration. Abnormal renal function testing.  TECHNIQUE: Grayscale ultrasound imaging of both kidneys and the urinary bladder was performed.  FINDINGS: Both kidneys are normal in size and ultrasound appearance. No evidence of urinary obstruction. No visible nephrolithiasis, renal mass, perinephric fluid collection or renal parenchymal scarring.  Incompletely filled urinary bladder is grossly negative. There is no bladder distention.  Right renal length: 11.1 cm. Left renal length: 12.2 cm.      Impression: Negative bilateral renal ultrasound examination.  This report was finalized on 1/22/2024 9:55 AM by Dr. Rafael Nickerson MD.       Allergies:  is allergic to penicillins.    Jacob  reviewed    Discharge Medications:     Discharge Medications        Continue These Medications        Instructions Start Date   clonazePAM 0.5 MG tablet  Commonly known as: KlonoPIN   1 p.o. twice daily as needed anxiety.      ondansetron 4 MG tablet  Commonly known as: ZOFRAN   4 mg, Oral, Every 8 Hours PRN      zolpidem 10 MG tablet  Commonly known as: AMBIEN   10 mg, Oral, Nightly PRN             Stop These Medications      Semaglutide-Weight Management 1.7 MG/0.75ML solution auto-injector              Last Lab Results:   Lab Results (most recent)       Procedure Component Value Units  Date/Time    Basic Metabolic Panel [[id number]]  (Abnormal) Collected: 01/22/24 1254    Specimen: Blood Updated: 01/22/24 1321     Glucose 94 mg/dL      BUN 30 mg/dL      Creatinine 1.09 mg/dL      Sodium 138 mmol/L      Potassium 3.3 mmol/L      Chloride 102 mmol/L      CO2 27.7 mmol/L      Calcium 8.2 mg/dL      BUN/Creatinine Ratio 27.5     Anion Gap 8.3 mmol/L      eGFR 63.6 mL/min/1.73     Narrative:      GFR Normal >60  Chronic Kidney Disease <60  Kidney Failure <15      POC Glucose Once [[id number]]  (Normal) Collected: 01/22/24 1203    Specimen: Blood Updated: 01/22/24 1211     Glucose 86 mg/dL     Creatinine Urine Random (kidney function) GFR component - Urine, Clean Catch [[id number]] Collected: 01/21/24 1858    Specimen: Urine, Clean Catch Updated: 01/22/24 1044     Creatinine, Urine 345.3 mg/dL     Narrative:      Reference intervals for random urine have not been established.  Clinical usage is dependent upon physician's interpretation in combination with other laboratory tests.       POC Glucose Once [[id number]]  (Normal) Collected: 01/22/24 0748    Specimen: Blood Updated: 01/22/24 0756     Glucose 95 mg/dL     Renal Function Panel [[id number]]  (Abnormal) Collected: 01/22/24 0347    Specimen: Blood Updated: 01/22/24 0448     Glucose 96 mg/dL      BUN 42 mg/dL      Creatinine 1.89 mg/dL      Sodium 138 mmol/L      Potassium 3.6 mmol/L      Chloride 100 mmol/L      CO2 26.7 mmol/L      Calcium 8.5 mg/dL      Albumin 4.1 g/dL      Phosphorus 3.6 mg/dL      Anion Gap 11.3 mmol/L      BUN/Creatinine Ratio 22.2     eGFR 32.8 mL/min/1.73     Narrative:      GFR Normal >60  Chronic Kidney Disease <60  Kidney Failure <15      Magnesium [[id number]]  (Normal) Collected: 01/22/24 0347    Specimen: Blood Updated: 01/22/24 0445     Magnesium 2.1 mg/dL     CK [[id number]]  (Abnormal) Collected: 01/22/24 0347    Specimen: Blood Updated: 01/22/24 0445     Creatine Kinase 364 U/L     CBC & Differential [[id number]]   (Abnormal) Collected: 01/22/24 0347    Specimen: Blood Updated: 01/22/24 0422    Narrative:      The following orders were created for panel order CBC & Differential.  Procedure                               Abnormality         Status                     ---------                               -----------         ------                     CBC Auto Differential[[id number]]        Abnormal            Final result                 Please view results for these tests on the individual orders.    CBC Auto Differential [[id number]]  (Abnormal) Collected: 01/22/24 0347    Specimen: Blood Updated: 01/22/24 0422     WBC 16.29 10*3/mm3      RBC 4.21 10*6/mm3      Hemoglobin 11.7 g/dL      Hematocrit 35.8 %      MCV 85.0 fL      MCH 27.8 pg      MCHC 32.7 g/dL      RDW 14.7 %      RDW-SD 45.0 fl      MPV 10.1 fL      Platelets 315 10*3/mm3      Neutrophil % 75.4 %      Lymphocyte % 11.2 %      Monocyte % 12.2 %      Eosinophil % 0.1 %      Basophil % 0.2 %      Immature Grans % 0.9 %      Neutrophils, Absolute 12.30 10*3/mm3      Lymphocytes, Absolute 1.82 10*3/mm3      Monocytes, Absolute 1.98 10*3/mm3      Eosinophils, Absolute 0.02 10*3/mm3      Basophils, Absolute 0.03 10*3/mm3      Immature Grans, Absolute 0.14 10*3/mm3      nRBC 0.0 /100 WBC     Sodium, Urine, Random - Urine, Clean Catch [[id number]] Collected: 01/21/24 1858    Specimen: Urine, Clean Catch Updated: 01/21/24 1918     Sodium, Urine 53 mmol/L     Narrative:      Reference intervals for random urine have not been established.  Clinical usage is dependent upon physician's interpretation in combination with other laboratory tests.       Urinalysis, Microscopic Only - Urine, Clean Catch [[id number]]  (Abnormal) Collected: 01/21/24 1858    Specimen: Urine, Clean Catch Updated: 01/21/24 1911     RBC, UA 11-20 /HPF      WBC, UA None Seen /HPF      Bacteria, UA Trace /HPF      Squamous Epithelial Cells, UA Too Numerous to Count /HPF      Hyaline Casts, UA None Seen  /LPF      Fine Granular Casts, UA 0-2 /LPF      Methodology Manual Light Microscopy    Urinalysis With Microscopic If Indicated (No Culture) - Urine, Clean Catch [[id number]]  (Abnormal) Collected: 01/21/24 1858    Specimen: Urine, Clean Catch Updated: 01/21/24 1908     Color, UA Yellow     Appearance, UA Slightly Cloudy     pH, UA <=5.0     Specific Gravity, UA 1.031     Comment: Result obtained by Refractometer        Glucose, UA Negative     Ketones, UA Trace     Bilirubin, UA Moderate (2+)     Blood, UA Moderate (2+)     Protein,  mg/dL (2+)     Leuk Esterase, UA Negative     Nitrite, UA Negative     Urobilinogen, UA 0.2 E.U./dL    Magnesium [[id number]]  (Normal) Collected: 01/21/24 1615    Specimen: Blood from Arm, Right Updated: 01/21/24 1818     Magnesium 2.1 mg/dL     CK [[id number]]  (Abnormal) Collected: 01/21/24 1615    Specimen: Blood from Arm, Right Updated: 01/21/24 1754     Creatine Kinase 277 U/L     [place] Draw [[id number]] Collected: 01/21/24 1615    Specimen: Blood from Arm, Right Updated: 01/21/24 1717    Narrative:      The following orders were created for panel order [place] Draw.  Procedure                               Abnormality         Status                     ---------                               -----------         ------                     Green Top (Gel)[[id number]]                                  Final result               Lavender Top[[id number]]                                     Final result               Gold Top - SST[[id number]]                                   Final result               Light Blue Top[[id number]]                                   Final result                 Please view results for these tests on the individual orders.    Light Blue Top [[id number]] Collected: 01/21/24 1615    Specimen: Blood from Arm, Right Updated: 01/21/24 1717     Extra Tube Hold for add-ons.     Comment: Auto resulted       Green Top (Gel) [[id number]] Collected: 01/21/24 1615     Specimen: Blood from Arm, Right Updated: 01/21/24 1717     Extra Tube Hold for add-ons.     Comment: Auto resulted.       Lavender Top [[id number]] Collected: 01/21/24 1615    Specimen: Blood from Arm, Right Updated: 01/21/24 1717     Extra Tube hold for add-on     Comment: Auto resulted       Gold Top - SST [[id number]] Collected: 01/21/24 1615    Specimen: Blood from Arm, Right Updated: 01/21/24 1717     Extra Tube Hold for add-ons.     Comment: Auto resulted.       hCG, Serum, Qualitative [[id number]]  (Normal) Collected: 01/21/24 1615    Specimen: Blood from Arm, Right Updated: 01/21/24 1648     HCG Qualitative Negative    Comprehensive Metabolic Panel [[id number]]  (Abnormal) Collected: 01/21/24 1615    Specimen: Blood from Arm, Right Updated: 01/21/24 1645     Glucose 168 mg/dL      BUN 48 mg/dL      Creatinine 5.26 mg/dL      Sodium 134 mmol/L      Potassium 3.1 mmol/L      Chloride 85 mmol/L      CO2 20.1 mmol/L      Calcium 10.5 mg/dL      Total Protein 9.7 g/dL      Albumin 5.6 g/dL      ALT (SGPT) 17 U/L      AST (SGOT) 22 U/L      Alkaline Phosphatase 75 U/L      Total Bilirubin 0.8 mg/dL      Globulin 4.1 gm/dL      A/G Ratio 1.4 g/dL      BUN/Creatinine Ratio 9.1     Anion Gap 28.9 mmol/L      eGFR 9.6 mL/min/1.73      Comment: <15 Indicative of kidney failure       Narrative:      GFR Normal >60  Chronic Kidney Disease <60  Kidney Failure <15      Lipase [[id number]]  (Normal) Collected: 01/21/24 1615    Specimen: Blood from Arm, Right Updated: 01/21/24 1645     Lipase 58 U/L     CBC & Differential [[id number]]  (Abnormal) Collected: 01/21/24 1615    Specimen: Blood from Arm, Right Updated: 01/21/24 1632    Narrative:      The following orders were created for panel order CBC & Differential.  Procedure                               Abnormality         Status                     ---------                               -----------         ------                     CBC Auto Differential[[id number]]         Abnormal            Final result                 Please view results for these tests on the individual orders.    CBC Auto Differential [[id number]]  (Abnormal) Collected: 01/21/24 1615    Specimen: Blood from Arm, Right Updated: 01/21/24 1632     WBC 22.91 10*3/mm3      RBC 5.67 10*6/mm3      Hemoglobin 15.2 g/dL      Hematocrit 47.3 %      MCV 83.4 fL      MCH 26.8 pg      MCHC 32.1 g/dL      RDW 14.2 %      RDW-SD 43.6 fl      MPV 9.8 fL      Platelets 465 10*3/mm3      Neutrophil % 88.3 %      Lymphocyte % 5.5 %      Monocyte % 5.9 %      Eosinophil % 0.0 %      Basophil % 0.0 %      Immature Grans % 0.3 %      Neutrophils, Absolute 20.18 10*3/mm3      Lymphocytes, Absolute 1.27 10*3/mm3      Monocytes, Absolute 1.36 10*3/mm3      Eosinophils, Absolute 0.01 10*3/mm3      Basophils, Absolute 0.01 10*3/mm3      Immature Grans, Absolute 0.08 10*3/mm3           Imaging Results (Most Recent)       Procedure Component Value Units Date/Time    US Renal Bilateral [[id number]] Collected: 01/22/24 0954     Updated: 01/22/24 0957    Narrative:      BILATERAL RENAL ULTRASOUND, 1/22/2024     HISTORY:  48-year-old female hospital inpatient with nausea, vomiting and  dehydration. Abnormal renal function testing.     TECHNIQUE:  Grayscale ultrasound imaging of both kidneys and the urinary bladder was  performed.     FINDINGS:  Both kidneys are normal in size and ultrasound appearance. No evidence  of urinary obstruction. No visible nephrolithiasis, renal mass,  perinephric fluid collection or renal parenchymal scarring.     Incompletely filled urinary bladder is grossly negative. There is no  bladder distention.     Right renal length: 11.1 cm.  Left renal length: 12.2 cm.       Impression:      Negative bilateral renal ultrasound examination.     This report was finalized on 1/22/2024 9:55 AM by Dr. Rafael Nickerson MD.               PROCEDURES      Condition on Discharge: Stable    Physical Exam at Discharge  Vital  Signs  Temp:  [97.7 °F (36.5 °C)-98.7 °F (37.1 °C)] 97.9 °F (36.6 °C)  Heart Rate:  [] 74  Resp:  [18] 18  BP: (122-142)/(66-93) 122/73    Physical Exam:  Physical Exam   Constitutional: Patient appears well-developed and well-nourished and in no acute distress   Cardiovascular: Regular rate, regular rhythm, S1 normal and S2 normal.  Exam reveals no gallop and no friction rub.  No murmur heard.  Pulmonary/Chest: Lungs are clear to auscultation bilaterally. No respiratory distress. No wheezes. No rhonchi. No rales.   Abdominal: Soft. Bowel sounds are normal. There is no tenderness.   Musculoskeletal: Normal Muscle tone  Extremities: No edema.   Neurological: Patient is alert and oriented to person, place, and time. Cranial nerves II-XII are grossly intact with no focal deficits.  Skin: Skin is warm. No rash noted. Nails show no clubbing.  No cyanosis or erythema.    Discharge Disposition  Home    Visiting Nurse:    No     Home PT/OT:  No     Home Safety Evaluation:  No    DME  None    Discharge Diet:      Dietary Orders (From admission, onward)       Start     Ordered    01/22/24 1126  Diet: Regular/House Diet, Gastrointestinal Diets; Fat-Restricted; Texture: Regular Texture (IDDSI 7); Fluid Consistency: Thin (IDDSI 0)  Diet Effective Now        References:    Diet Order [place]   Question Answer Comment   Diets: Regular/House Diet    Diets: Gastrointestinal Diets    Gastrointestinal Diet: Fat-Restricted    Texture: Regular Texture (IDDSI 7)    Fluid Consistency: Thin (IDDSI 0)        01/22/24 1126                    Activity at Discharge:  As tolerated      Follow-up Appointments  Future Appointments   Date Time Provider Department Center   1/26/2024  9:45 AM [facility]   2/1/2024  2:00 PM Adalberto Livnigston MD [facility]     Additional Instructions for the Follow-ups that You Need to Schedule       Discharge Follow-up with PCP   As directed       Currently Documented PCP:     Adalberto Livingston MD    PCP Phone Number:    [phone]     Follow Up Details: As scheduled                Test Results Pending at Discharge  None     Aquiles Schmid MD  01/22/24  13:40 EST    Time: <30 minutes    Electronically signed by Aquiles Schmid MD at 01/24/24 0331

## 2024-01-26 LAB
ALBUMIN SERPL-MCNC: 5 G/DL (ref 3.5–5.2)
ALBUMIN/GLOB SERPL: 2.1 G/DL
ALP SERPL-CCNC: 58 U/L (ref 39–117)
ALT SERPL-CCNC: 21 U/L (ref 1–33)
AST SERPL-CCNC: 17 U/L (ref 1–32)
BASOPHILS # BLD AUTO: 0.03 10*3/MM3 (ref 0–0.2)
BASOPHILS NFR BLD AUTO: 0.5 % (ref 0–1.5)
BILIRUB SERPL-MCNC: 0.6 MG/DL (ref 0–1.2)
BUN SERPL-MCNC: 14 MG/DL (ref 6–20)
BUN/CREAT SERPL: 15.9 (ref 7–25)
CALCIUM SERPL-MCNC: 9.8 MG/DL (ref 8.6–10.5)
CHLORIDE SERPL-SCNC: 99 MMOL/L (ref 98–107)
CHOLEST SERPL-MCNC: 198 MG/DL (ref 0–200)
CHOLEST/HDLC SERPL: 4.95 {RATIO}
CO2 SERPL-SCNC: 24.4 MMOL/L (ref 22–29)
CREAT SERPL-MCNC: 0.88 MG/DL (ref 0.57–1)
EGFRCR SERPLBLD CKD-EPI 2021: 82.2 ML/MIN/1.73
EOSINOPHIL # BLD AUTO: 0.1 10*3/MM3 (ref 0–0.4)
EOSINOPHIL NFR BLD AUTO: 1.6 % (ref 0.3–6.2)
ERYTHROCYTE [DISTWIDTH] IN BLOOD BY AUTOMATED COUNT: 13.8 % (ref 12.3–15.4)
GLOBULIN SER CALC-MCNC: 2.4 GM/DL
GLUCOSE SERPL-MCNC: 88 MG/DL (ref 65–99)
HCT VFR BLD AUTO: 37.9 % (ref 34–46.6)
HDLC SERPL-MCNC: 40 MG/DL (ref 40–60)
HGB BLD-MCNC: 12.6 G/DL (ref 12–15.9)
IMM GRANULOCYTES # BLD AUTO: 0.02 10*3/MM3 (ref 0–0.05)
IMM GRANULOCYTES NFR BLD AUTO: 0.3 % (ref 0–0.5)
LDLC SERPL CALC-MCNC: 137 MG/DL (ref 0–100)
LYMPHOCYTES # BLD AUTO: 2.22 10*3/MM3 (ref 0.7–3.1)
LYMPHOCYTES NFR BLD AUTO: 34.9 % (ref 19.6–45.3)
MCH RBC QN AUTO: 27.7 PG (ref 26.6–33)
MCHC RBC AUTO-ENTMCNC: 33.2 G/DL (ref 31.5–35.7)
MCV RBC AUTO: 83.3 FL (ref 79–97)
MONOCYTES # BLD AUTO: 0.74 10*3/MM3 (ref 0.1–0.9)
MONOCYTES NFR BLD AUTO: 11.6 % (ref 5–12)
NEUTROPHILS # BLD AUTO: 3.25 10*3/MM3 (ref 1.7–7)
NEUTROPHILS NFR BLD AUTO: 51.1 % (ref 42.7–76)
NRBC BLD AUTO-RTO: 0 /100 WBC (ref 0–0.2)
PLATELET # BLD AUTO: 320 10*3/MM3 (ref 140–450)
POTASSIUM SERPL-SCNC: 3.8 MMOL/L (ref 3.5–5.2)
PROT SERPL-MCNC: 7.4 G/DL (ref 6–8.5)
RBC # BLD AUTO: 4.55 10*6/MM3 (ref 3.77–5.28)
SODIUM SERPL-SCNC: 137 MMOL/L (ref 136–145)
TRIGL SERPL-MCNC: 117 MG/DL (ref 0–150)
VLDLC SERPL CALC-MCNC: 21 MG/DL (ref 5–40)
WBC # BLD AUTO: 6.36 10*3/MM3 (ref 3.4–10.8)

## 2024-02-01 ENCOUNTER — OFFICE VISIT (OUTPATIENT)
Dept: FAMILY MEDICINE CLINIC | Facility: CLINIC | Age: 49
End: 2024-02-01
Payer: COMMERCIAL

## 2024-02-01 VITALS
HEART RATE: 81 BPM | WEIGHT: 186.3 LBS | TEMPERATURE: 96.8 F | OXYGEN SATURATION: 99 % | BODY MASS INDEX: 29.24 KG/M2 | DIASTOLIC BLOOD PRESSURE: 82 MMHG | HEIGHT: 67 IN | RESPIRATION RATE: 15 BRPM | SYSTOLIC BLOOD PRESSURE: 118 MMHG

## 2024-02-01 DIAGNOSIS — F41.9 ANXIETY: ICD-10-CM

## 2024-02-01 DIAGNOSIS — Z12.11 COLON CANCER SCREENING: Primary | ICD-10-CM

## 2024-02-01 DIAGNOSIS — F51.01 PRIMARY INSOMNIA: ICD-10-CM

## 2024-02-01 RX ORDER — CLONAZEPAM 0.5 MG/1
TABLET ORAL
Qty: 60 TABLET | Refills: 2 | Status: SHIPPED | OUTPATIENT
Start: 2024-02-01

## 2024-02-01 RX ORDER — ZOLPIDEM TARTRATE 10 MG/1
10 TABLET ORAL NIGHTLY PRN
Qty: 30 TABLET | Refills: 2 | Status: SHIPPED | OUTPATIENT
Start: 2024-02-01

## 2024-02-01 NOTE — TELEPHONE ENCOUNTER
Caller: Radha Barry    Relationship to patient: Self    Best call back number: [phone]    Patient is needing: PATIENT STATED THAT SHE FORGOT TO MENTION TO REFILL THIS PRESCRIPTION AND IS REQUESTING IF IT CAN BE FILED AS SOON POSSIBLE AS SHE IS OUT OF THE MEDICATION.

## 2024-02-02 NOTE — PROGRESS NOTES
Subjective   Radah Barry is a 48 y.o. female. Patient is here today for   Chief Complaint   Patient presents with    Anxiety    Med Refill          Vitals:    24 1348   BP: 118/82   Pulse: 81   Resp: 15   Temp: 96.8 °F (36 °C)   SpO2: 99%     Body mass index is 29.17 kg/m².      Past Medical History:   Diagnosis Date    Anxiety     Endometrial polyp     GERD (gastroesophageal reflux disease)     Insomnia     Ovarian cyst     PONV (postoperative nausea and vomiting)       Allergies   Allergen Reactions    Penicillins Anaphylaxis      Social History     Socioeconomic History    Marital status:    Tobacco Use    Smoking status: Former     Packs/day: 0.50     Years: 15.00     Additional pack years: 0.00     Total pack years: 7.50     Types: Cigarettes     Start date:      Quit date: 2008     Years since quittin.0     Passive exposure: Past    Smokeless tobacco: Former   Vaping Use    Vaping Use: Never used   Substance and Sexual Activity    Alcohol use: Not Currently     Comment: SOCIAL, weekends    Drug use: No    Sexual activity: Defer        Current Outpatient Medications:     ondansetron (ZOFRAN) 4 MG tablet, Take 1 tablet by mouth Every 8 (Eight) Hours As Needed for Nausea or Vomiting (one to two tablets po q 6 hours prn nausea)., Disp: 15 tablet, Rfl: 1    zolpidem (AMBIEN) 10 MG tablet, Take 1 tablet by mouth At Night As Needed for Sleep., Disp: 30 tablet, Rfl: 2    clonazePAM (KlonoPIN) 0.5 MG tablet, 1 p.o. twice daily as needed anxiety., Disp: 60 tablet, Rfl: 2     Objective     History of Present Illness  This patient is here to follow-up on Chronic sleep onset insomnia.  She has been taking Ambien nightly for years.  She and I have discussed the desirability of her stopping this medication for the past several visits.  I would like her to begin to taper down on this medication with an idea towards stopping it entirely.    She is also here to follow-up on generalized anxiety.   She takes 1 clonazepam twice daily as needed anxiety.    She is due for colon cancer screening.    She does follow-up with gynecology for yearly for routine gynecology exams.  Her gynecologist arranges mammograms and bone densities Pap smears etc. as appropriate.      Anxiety             Review of Systems   Constitutional: Negative.    HENT: Negative.     Respiratory: Negative.     Cardiovascular: Negative.    Musculoskeletal: Negative.    Psychiatric/Behavioral: Negative.         Physical Exam  Vitals and nursing note reviewed.   Constitutional:       Appearance: Normal appearance.      Comments: Pleasant, neatly groomed, no distress.   Cardiovascular:      Rate and Rhythm: Regular rhythm.      Heart sounds: Normal heart sounds. No murmur heard.     No gallop.   Pulmonary:      Effort: No respiratory distress.      Breath sounds: Normal breath sounds. No wheezing or rales.   Neurological:      Mental Status: She is alert and oriented to person, place, and time.   Psychiatric:         Mood and Affect: Mood normal.         Behavior: Behavior normal.         Thought Content: Thought content normal.           Problems Addressed this Visit          Sleep    Primary insomnia    Relevant Medications    zolpidem (AMBIEN) 10 MG tablet     Other Visit Diagnoses       Colon cancer screening    -  Primary    Relevant Orders    Ambulatory Referral to Gastroenterology          Diagnoses         Codes Comments    Colon cancer screening    -  Primary ICD-10-CM: Z12.11  ICD-9-CM: V76.51     Primary insomnia     ICD-10-CM: F51.01  ICD-9-CM: 307.42               PLAN  I asked her to follow-up with me for a yearly annual physical exam in few months.    Fasting labs prior to that visit should include: Urine drug screen, comprehensive metabolic panel, CBC, urinalysis, vitamin D level.  TSH with reflex free T4 (regarding her chronic anxiety).  No follow-ups on file.  Answers submitted by the patient for this visit:  Other (Submitted on  1/25/2024)  Please describe your symptoms.: Six month med check along with check up from blood results and after hospital stay  Have you had these symptoms before?: Yes  How long have you been having these symptoms?: Greater than 2 weeks  Please list any medications you are currently taking for this condition.: Zolpidem and clonazepam  Primary Reason for Visit (Submitted on 1/25/2024)  What is the primary reason for your visit?: Other

## 2024-04-12 DIAGNOSIS — F51.01 PRIMARY INSOMNIA: ICD-10-CM

## 2024-04-12 DIAGNOSIS — F41.9 ANXIETY: ICD-10-CM

## 2024-04-12 RX ORDER — ZOLPIDEM TARTRATE 10 MG/1
10 TABLET ORAL NIGHTLY PRN
Qty: 30 TABLET | Refills: 2 | Status: SHIPPED | OUTPATIENT
Start: 2024-04-12

## 2024-04-12 RX ORDER — CLONAZEPAM 0.5 MG/1
TABLET ORAL
Qty: 60 TABLET | Refills: 2 | Status: SHIPPED | OUTPATIENT
Start: 2024-04-12

## 2024-05-10 ENCOUNTER — TELEPHONE (OUTPATIENT)
Dept: GASTROENTEROLOGY | Facility: CLINIC | Age: 49
End: 2024-05-10
Payer: COMMERCIAL

## 2024-05-10 DIAGNOSIS — Z86.010 PERSONAL HISTORY OF COLONIC POLYPS: Primary | ICD-10-CM

## 2024-05-13 PROBLEM — Z86.010 PERSONAL HISTORY OF COLONIC POLYPS: Status: ACTIVE | Noted: 2024-05-10

## 2024-05-13 PROBLEM — Z86.0100 PERSONAL HISTORY OF COLONIC POLYPS: Status: ACTIVE | Noted: 2024-05-10

## 2024-05-20 ENCOUNTER — TELEPHONE (OUTPATIENT)
Dept: GASTROENTEROLOGY | Facility: CLINIC | Age: 49
End: 2024-05-20
Payer: COMMERCIAL

## 2024-05-20 NOTE — TELEPHONE ENCOUNTER
Spoke with patient.  She states will be leaving the following day on vacation and will be driving.  Would like to reschedule after I get back.    Scheduled at [place] 07/17/2024 at 12pm - arrive 11am.  She corrected her copy of prep instructions.

## 2024-05-20 NOTE — TELEPHONE ENCOUNTER
Caller: Radha Barry    Relationship to patient: Self    Best call back number: [phone]    Chief complaint: RESCHEDULE COLONOSCOPY     Type of visit: GASTRO PROCEDURE     If rescheduling, when is the original appointment: 07/12/24     Additional notes:PATIENT WILL BE GOING OUT OF TOWN THE FOLLOWING DAY NEEDS TO R/S

## 2024-05-21 NOTE — TELEPHONE ENCOUNTER
Caller: Radha Barry    Relationship to patient: Self    Best call back number: [phone]    Chief complaint: RESCHEDULE COLONOSCOPY     Type of visit: GASTRO PROCEDURE     Requested date: THE FOLLOWING WEDNESDAY IF STILL AVAILABLE.      If rescheduling, when is the original appointment: 07/17/24     Additional notes:WILL STILL BE OUT OF TOWN ON 07/17/24. SHE WAS OFFERED THE FOLLOWING WEDNESDAY AND WILL TAKE THAT APPT IF STILL AVAILABLE.

## 2024-06-04 ENCOUNTER — TELEPHONE (OUTPATIENT)
Dept: GASTROENTEROLOGY | Facility: CLINIC | Age: 49
End: 2024-06-04
Payer: COMMERCIAL

## 2024-06-25 RX ORDER — ONDANSETRON 4 MG/1
4 TABLET, FILM COATED ORAL EVERY 8 HOURS PRN
Qty: 15 TABLET | Refills: 1 | Status: SHIPPED | OUTPATIENT
Start: 2024-06-25

## 2024-06-30 DIAGNOSIS — F41.9 ANXIETY: ICD-10-CM

## 2024-06-30 DIAGNOSIS — F51.01 PRIMARY INSOMNIA: ICD-10-CM

## 2024-07-01 RX ORDER — ZOLPIDEM TARTRATE 10 MG/1
10 TABLET ORAL NIGHTLY PRN
Qty: 30 TABLET | Refills: 0 | Status: SHIPPED | OUTPATIENT
Start: 2024-07-01

## 2024-07-01 RX ORDER — CLONAZEPAM 0.5 MG/1
TABLET ORAL
Qty: 60 TABLET | Refills: 0 | Status: SHIPPED | OUTPATIENT
Start: 2024-07-01

## 2024-07-23 ENCOUNTER — ANESTHESIA EVENT (OUTPATIENT)
Dept: PERIOP | Facility: HOSPITAL | Age: 49
End: 2024-07-23
Payer: COMMERCIAL

## 2024-07-24 ENCOUNTER — HOSPITAL ENCOUNTER (OUTPATIENT)
Facility: HOSPITAL | Age: 49
Setting detail: HOSPITAL OUTPATIENT SURGERY
Discharge: HOME | End: 2024-07-24
Attending: INTERNAL MEDICINE | Admitting: INTERNAL MEDICINE
Payer: COMMERCIAL

## 2024-07-24 ENCOUNTER — ANESTHESIA (OUTPATIENT)
Dept: PERIOP | Facility: HOSPITAL | Age: 49
End: 2024-07-24
Payer: COMMERCIAL

## 2024-07-24 VITALS
OXYGEN SATURATION: 100 % | HEART RATE: 67 BPM | RESPIRATION RATE: 18 BRPM | WEIGHT: 188.6 LBS | SYSTOLIC BLOOD PRESSURE: 124 MMHG | TEMPERATURE: 97.5 F | BODY MASS INDEX: 29.53 KG/M2 | DIASTOLIC BLOOD PRESSURE: 74 MMHG

## 2024-07-24 DIAGNOSIS — Z86.010 PERSONAL HISTORY OF COLONIC POLYPS: ICD-10-CM

## 2024-07-24 PROCEDURE — 88305 TISSUE EXAM BY PATHOLOGIST: CPT | Performed by: INTERNAL MEDICINE

## 2024-07-24 PROCEDURE — 45380 COLONOSCOPY AND BIOPSY: CPT | Performed by: INTERNAL MEDICINE

## 2024-07-24 PROCEDURE — 25810000003 LACTATED RINGERS PER 1000 ML

## 2024-07-24 PROCEDURE — 25010000002 PROPOFOL 200 MG/20ML EMULSION: Performed by: NURSE ANESTHETIST, CERTIFIED REGISTERED

## 2024-07-24 RX ORDER — SODIUM CHLORIDE, SODIUM LACTATE, POTASSIUM CHLORIDE, CALCIUM CHLORIDE 600; 310; 30; 20 MG/100ML; MG/100ML; MG/100ML; MG/100ML
9 INJECTION, SOLUTION INTRAVENOUS CONTINUOUS
Status: DISCONTINUED | OUTPATIENT
Start: 2024-07-24 | End: 2024-07-25 | Stop reason: HOSPADM

## 2024-07-24 RX ORDER — ONDANSETRON 2 MG/ML
4 INJECTION INTRAMUSCULAR; INTRAVENOUS ONCE AS NEEDED
Status: DISCONTINUED | OUTPATIENT
Start: 2024-07-24 | End: 2024-07-25 | Stop reason: HOSPADM

## 2024-07-24 RX ORDER — SODIUM CHLORIDE 0.9 % (FLUSH) 0.9 %
10 SYRINGE (ML) INJECTION AS NEEDED
Status: DISCONTINUED | OUTPATIENT
Start: 2024-07-24 | End: 2024-07-25 | Stop reason: HOSPADM

## 2024-07-24 RX ORDER — SODIUM CHLORIDE 0.9 % (FLUSH) 0.9 %
10 SYRINGE (ML) INJECTION EVERY 12 HOURS SCHEDULED
Status: DISCONTINUED | OUTPATIENT
Start: 2024-07-24 | End: 2024-07-25 | Stop reason: HOSPADM

## 2024-07-24 RX ORDER — SODIUM CHLORIDE, SODIUM LACTATE, POTASSIUM CHLORIDE, CALCIUM CHLORIDE 600; 310; 30; 20 MG/100ML; MG/100ML; MG/100ML; MG/100ML
100 INJECTION, SOLUTION INTRAVENOUS ONCE
Status: DISCONTINUED | OUTPATIENT
Start: 2024-07-24 | End: 2024-07-25 | Stop reason: HOSPADM

## 2024-07-24 RX ORDER — LIDOCAINE HYDROCHLORIDE 20 MG/ML
INJECTION, SOLUTION INFILTRATION; PERINEURAL AS NEEDED
Status: DISCONTINUED | OUTPATIENT
Start: 2024-07-24 | End: 2024-07-24 | Stop reason: SURG

## 2024-07-24 RX ORDER — PROPOFOL 10 MG/ML
INJECTION, EMULSION INTRAVENOUS AS NEEDED
Status: DISCONTINUED | OUTPATIENT
Start: 2024-07-24 | End: 2024-07-24 | Stop reason: SURG

## 2024-07-24 RX ORDER — LIDOCAINE HYDROCHLORIDE 10 MG/ML
0.5 INJECTION, SOLUTION EPIDURAL; INFILTRATION; INTRACAUDAL; PERINEURAL ONCE AS NEEDED
Status: DISCONTINUED | OUTPATIENT
Start: 2024-07-24 | End: 2024-07-25 | Stop reason: HOSPADM

## 2024-07-24 RX ORDER — FAMOTIDINE 10 MG/ML
20 INJECTION, SOLUTION INTRAVENOUS
Status: DISCONTINUED | OUTPATIENT
Start: 2024-07-24 | End: 2024-07-25 | Stop reason: HOSPADM

## 2024-07-24 RX ORDER — SODIUM CHLORIDE 9 MG/ML
40 INJECTION, SOLUTION INTRAVENOUS AS NEEDED
Status: DISCONTINUED | OUTPATIENT
Start: 2024-07-24 | End: 2024-07-25 | Stop reason: HOSPADM

## 2024-07-24 RX ADMIN — PROPOFOL 350 MG: 10 INJECTION, EMULSION INTRAVENOUS at 11:02

## 2024-07-24 RX ADMIN — SODIUM CHLORIDE, POTASSIUM CHLORIDE, SODIUM LACTATE AND CALCIUM CHLORIDE 9 ML/HR: 600; 310; 30; 20 INJECTION, SOLUTION INTRAVENOUS at 09:09

## 2024-07-24 RX ADMIN — LIDOCAINE HYDROCHLORIDE 50 MG: 20 INJECTION, SOLUTION INFILTRATION; PERINEURAL at 11:02

## 2024-07-24 NOTE — ANESTHESIA PREPROCEDURE EVALUATION
Anesthesia Evaluation     Patient summary reviewed, Nursing notes reviewed and pregnancy test completed   NPO Solid Status: > 8 hours  NPO Liquid Status: > 8 hours           Airway   Mallampati: II  TM distance: >3 FB  Neck ROM: full  No difficulty expected  Dental - normal exam     Pulmonary - normal exam    breath sounds clear to auscultation  (+) a smoker (16 years ago) Former,  Cardiovascular - negative cardio ROS and normal exam    Rhythm: regular  Rate: normal        Neuro/Psych  (+) psychiatric history Anxiety  GI/Hepatic/Renal/Endo    (+) obesity, GERD well controlled, renal disease (Pre-renal JAZMÍN earlier this year s/p first dose of wegovy and subsequent vomiting for multiple days. Kidney function now stable.)-    Musculoskeletal (-) negative ROS    Abdominal  - normal exam   Substance History - negative use     OB/GYN negative ob/gyn ROS         Other                          Anesthesia Plan    ASA 2     MAC     intravenous induction     Anesthetic plan, risks, benefits, and alternatives have been provided, discussed and informed consent has been obtained with: patient and child.  Pre-procedure education provided  Use of blood products discussed with patient and child  Consented to blood products.    Plan discussed with CRNA.

## 2024-07-24 NOTE — H&P
Patient Care Team:  Adalberto Livingston MD as PCP - General    CHIEF COMPLAINT: Personal hx colon polyps    HISTORY OF PRESENT ILLNESS:  Last exam was     Past Medical History:   Diagnosis Date    Anxiety     Endometrial polyp     GERD (gastroesophageal reflux disease)     History of kidney problems     Insomnia     Ovarian cyst     PONV (postoperative nausea and vomiting)      Past Surgical History:   Procedure Laterality Date    BREAST CYST EXCISION      BREAST SURGERY       SECTION      CHOLECYSTECTOMY      COLONOSCOPY N/A 2019    Procedure: COLONOSCOPY TO CECUM AND INTO TI WITH COLD BIOSPIES AND HOT SNARE POLYPECTOMIES WITH RESOLUTION CLIP X1;  Surgeon: Jordana Cruz MD;  Location: [facility] ENDOSCOPY;  Service: Gastroenterology    D & C HYSTEROSCOPY N/A 2019    Procedure: HYSTERSCOPE DILATATION AND CURETTAGE MYOSURE AND NOVASURE ABLATION;  Surgeon: Abimbola Dumont MD;  [place] [facility] [place] [facility] [place]  Service: Obstetrics/Gynecology    GALLBLADDER SURGERY      TUBAL ABDOMINAL LIGATION      WISDOM TOOTH EXTRACTION       Family History   Problem Relation Age of Onset    Migraines Mother     Diverticulitis Mother     Malig Hyperthermia Neg Hx      Social History     Tobacco Use    Smoking status: Former     Current packs/day: 0.00     Average packs/day: 0.5 packs/day for 15.0 years (7.5 ttl pk-yrs)     Types: Cigarettes     Start date:      Quit date: 2008     Years since quittin.5     Passive exposure: Past    Smokeless tobacco: Former   Vaping Use    Vaping status: Never Used   Substance Use Topics    Alcohol use: Yes     Comment: SOCIAL, weekends    Drug use: No     Medications Prior to Admission   Medication Sig Dispense Refill Last Dose    clonazePAM (KlonoPIN) 0.5 MG tablet 1 p.o. twice daily as needed anxiety. 60 tablet 0 2024    zolpidem (AMBIEN) 10 MG tablet Take 1 tablet by mouth At Night As Needed for Sleep. 30 tablet 0 2024    ondansetron (ZOFRAN) 4 MG tablet Take  1 tablet by mouth Every 8 (Eight) Hours As Needed for Nausea or Vomiting (one to two tablets po q 6 hours prn nausea). 15 tablet 1 More than a month     Allergies:  Penicillins    REVIEW OF SYSTEMS:  Please see the above history of present illness for pertinent positives and negatives.  The remainder of the patient's systems have been reviewed and are negative.     Vital Signs  Temp:  [97.5 °F (36.4 °C)] 97.5 °F (36.4 °C)  Resp:  [15] 15  BP: (115)/(73) 115/73    Flowsheet Rows      Flowsheet Row First Filed Value   Admission Height --   Admission Weight 85.5 kg (188 lb 9.6 oz) Documented at 07/24/2024 0902             Physical Exam:  Physical Exam   Constitutional: Patient appears well-developed and well-nourished and in no acute distress   HEENT:   Head: Normocephalic and atraumatic.   Eyes:  Pupils are equal, round, and reactive to light. EOM are intact. Sclerae are anicteric and non-injected.  Mouth and Throat: Patient has moist mucous membranes. Oropharynx is clear of any erythema or exudate.     Neck: Neck supple. No JVD present. No thyromegaly present. No lymphadenopathy present.  Cardiovascular: Regular rate, regular rhythm, S1 normal and S2 normal.  Exam reveals no gallop and no friction rub.  No murmur heard.  Pulmonary/Chest: Lungs are clear to auscultation bilaterally. No respiratory distress. No wheezes. No rhonchi. No rales.   Abdominal: Soft. Bowel sounds are normal. No distension and no mass. There is no hepatosplenomegaly. There is no tenderness.   Musculoskeletal: Normal Muscle tone  Extremities: No edema. Pulses are palpable in all 4 extremities.  Neurological: Patient is alert and oriented to person, place, and time. Cranial nerves II-XII are grossly intact with no focal deficits.  Skin: Skin is warm. No rash noted. Nails show no clubbing.  No cyanosis or erythema.    Debilities/Disabilities Identified: None  Emotional Behavior: Appropriate     Results Review:   I reviewed the patient's new  clinical results.    Lab Results (most recent)       None            Imaging Results (Most Recent)       None          reviewed    ECG/EMG Results (most recent)       None          reviewed    Assessment & Plan   Personal hx colon polyps/  colonoscopy      I discussed the patient's findings and my recommendations with patient.     Ángel Aguiar MD  07/24/24  10:56 EDT    Time: 10 min prior to procedure.

## 2024-07-24 NOTE — BRIEF OP NOTE
COLONOSCOPY WITH POLYPECTOMY  Progress Note    Radha Barry  7/24/2024    Pre-op Diagnosis:   Personal history of colonic polyps [Z86.010]       Post-Op Diagnosis Codes:     * Personal history of colonic polyps [Z86.010]     * Diverticulosis [K57.90]     * Colon polyp [K63.5]    Procedure/CPT® Codes:        Procedure(s):  COLONOSCOPY WITH POLYPECTOMY              Surgeon(s):  Ángel Aguiar MD    Anesthesia: Monitored Anesthesia Care    Staff:   Circulator: Lela Pérez RN; Bryce Miranda RN  Scrub Person: Kayley Chawla Technician: Anastasiia Paulino         Estimated Blood Loss: none    Urine Voided: * No values recorded between 7/24/2024 10:57 AM and 7/24/2024 11:27 AM *    Specimens:                Specimens       ID Source Type Tests Collected By Collected At Frozen?    A Large Intestine, Sigmoid Colon Polyp TISSUE PATHOLOGY EXAM   Ángel Aguiar MD 7/24/24 1120                   Drains: * No LDAs found *    Findings: Colon to TI good prep  Sigmoid Diverticulosis  Polyp-biopsy        Complications: none          Ángel Aguiar MD     Date: 7/24/2024  Time: 11:28 EDT

## 2024-07-24 NOTE — ANESTHESIA POSTPROCEDURE EVALUATION
Patient: Radha Barry    Procedure Summary       Date: 07/24/24 Room / Location: [facility] ENDOSCOPY 1 /  [facility] OR    Anesthesia Start: 1058 Anesthesia Stop: 1131    Procedure: COLONOSCOPY WITH POLYPECTOMY Diagnosis:       Personal history of colonic polyps      Diverticulosis      Colon polyp      (Personal history of colonic polyps [Z86.010])    Surgeons: Ángel Aguiar MD Provider: Jessica Golden CRNA    Anesthesia Type: MAC ASA Status: 2            Anesthesia Type: MAC    Vitals  Vitals Value Taken Time   /74 07/24/24 1153   Temp     Pulse 70 07/24/24 1154   Resp 18 07/24/24 1150   SpO2 100 % 07/24/24 1154   Vitals shown include unfiled device data.        Post Anesthesia Care and Evaluation    Patient location during evaluation: PHASE II  Patient participation: complete - patient participated  Level of consciousness: awake and alert  Pain score: 0  Pain management: adequate    Airway patency: patent  Anesthetic complications: No anesthetic complications  PONV Status: none  Cardiovascular status: acceptable  Respiratory status: acceptable  Hydration status: acceptable

## 2024-07-26 LAB
LAB AP CASE REPORT: NORMAL
PATH REPORT.FINAL DX SPEC: NORMAL
PATH REPORT.GROSS SPEC: NORMAL

## 2024-08-15 ENCOUNTER — OFFICE VISIT (OUTPATIENT)
Dept: FAMILY MEDICINE CLINIC | Facility: CLINIC | Age: 49
End: 2024-08-15
Payer: COMMERCIAL

## 2024-08-15 VITALS
RESPIRATION RATE: 15 BRPM | SYSTOLIC BLOOD PRESSURE: 130 MMHG | BODY MASS INDEX: 30.43 KG/M2 | DIASTOLIC BLOOD PRESSURE: 82 MMHG | TEMPERATURE: 96 F | OXYGEN SATURATION: 99 % | WEIGHT: 193.9 LBS | HEART RATE: 82 BPM | HEIGHT: 67 IN

## 2024-08-15 DIAGNOSIS — F41.9 ANXIETY: ICD-10-CM

## 2024-08-15 DIAGNOSIS — F51.01 PRIMARY INSOMNIA: ICD-10-CM

## 2024-08-15 PROCEDURE — 99213 OFFICE O/P EST LOW 20 MIN: CPT | Performed by: INTERNAL MEDICINE

## 2024-08-15 RX ORDER — ZOLPIDEM TARTRATE 10 MG/1
10 TABLET ORAL NIGHTLY PRN
Qty: 90 TABLET | Refills: 1 | Status: SHIPPED | OUTPATIENT
Start: 2024-08-15

## 2024-08-15 RX ORDER — CLONAZEPAM 0.5 MG/1
TABLET ORAL
Qty: 60 TABLET | Refills: 1 | Status: SHIPPED | OUTPATIENT
Start: 2024-08-15

## 2024-08-16 NOTE — PROGRESS NOTES
Subjective   Radha Barry is a 49 y.o. female. Patient is here today for   Chief Complaint   Patient presents with    Anxiety          Vitals:    08/15/24 1439   BP: 130/82   Pulse: 82   Resp: 15   Temp: 96 °F (35.6 °C)   SpO2: 99%     Body mass index is 30.36 kg/m².      Past Medical History:   Diagnosis Date    Anxiety     Endometrial polyp     GERD (gastroesophageal reflux disease)     History of kidney problems     Insomnia     Ovarian cyst     PONV (postoperative nausea and vomiting)       Allergies   Allergen Reactions    Penicillins Anaphylaxis      Social History     Socioeconomic History    Marital status:    Tobacco Use    Smoking status: Former     Current packs/day: 0.00     Average packs/day: 0.5 packs/day for 15.0 years (7.5 ttl pk-yrs)     Types: Cigarettes     Start date:      Quit date: 2008     Years since quittin.5     Passive exposure: Past    Smokeless tobacco: Former   Vaping Use    Vaping status: Never Used   Substance and Sexual Activity    Alcohol use: Yes     Comment: SOCIAL, weekends    Drug use: No    Sexual activity: Defer        Current Outpatient Medications:     clonazePAM (KlonoPIN) 0.5 MG tablet, 1 p.o. twice daily as needed anxiety., Disp: 60 tablet, Rfl: 1    ondansetron (ZOFRAN) 4 MG tablet, Take 1 tablet by mouth Every 8 (Eight) Hours As Needed for Nausea or Vomiting (one to two tablets po q 6 hours prn nausea)., Disp: 15 tablet, Rfl: 1    zolpidem (AMBIEN) 10 MG tablet, Take 1 tablet by mouth At Night As Needed for Sleep., Disp: 90 tablet, Rfl: 1     Objective     History of Present Illness  She is here to follow-up on anxiety and insomnia.    She and I had discussed for many years my hope that she could cut back on both of these habit-forming medications a little bit in time.    She has not been able to do that.    Otherwise, she feels well.    She has no complaints.  Anxiety         Review of Systems   Constitutional: Negative.    HENT: Negative.      Respiratory: Negative.     Cardiovascular: Negative.    Musculoskeletal: Negative.    Psychiatric/Behavioral: Negative.         Physical Exam  Vitals and nursing note reviewed.   Constitutional:       Appearance: Normal appearance.      Comments: Pleasant, pneumogram, no distress.   Cardiovascular:      Rate and Rhythm: Regular rhythm.      Heart sounds: Normal heart sounds. No murmur heard.     No gallop.   Pulmonary:      Effort: No respiratory distress.      Breath sounds: Normal breath sounds. No wheezing or rales.   Neurological:      Mental Status: She is alert and oriented to person, place, and time.   Psychiatric:         Mood and Affect: Mood normal.         Behavior: Behavior normal.         Thought Content: Thought content normal.           Problems Addressed this Visit          Mental Health    Anxiety    Relevant Medications    clonazePAM (KlonoPIN) 0.5 MG tablet       Sleep    Primary insomnia    Relevant Medications    zolpidem (AMBIEN) 10 MG tablet     Diagnoses         Codes Comments    Anxiety     ICD-10-CM: F41.9  ICD-9-CM: 300.00     Primary insomnia     ICD-10-CM: F51.01  ICD-9-CM: 307.42               PLAN  I refilled her Ambien and alprazolam.  I asked her to be cautious with these for patients.    Should she consider tapering off her medications she should first cut the dose in half and continue to use them as she regularly does at smaller doses.  This time goes by hopefully she will be able to do this.    She is overdue for an annual exam.  I asked her to follow-up in 6 months.  That should be for a annual physical exam.    Fasting labs prior to that visit should include: Lipid profile, comprehensive metabolic panel, CBC, urinalysis and urine drug screen.  No follow-ups on file.

## 2024-10-13 DIAGNOSIS — F41.9 ANXIETY: ICD-10-CM

## 2024-10-14 RX ORDER — CLONAZEPAM 0.5 MG/1
TABLET ORAL
Qty: 60 TABLET | Refills: 1 | Status: SHIPPED | OUTPATIENT
Start: 2024-10-14

## 2024-11-07 ENCOUNTER — APPOINTMENT (OUTPATIENT)
Dept: WOMENS IMAGING | Facility: HOSPITAL | Age: 49
End: 2024-11-07
Payer: COMMERCIAL

## 2024-11-07 PROCEDURE — 76642 ULTRASOUND BREAST LIMITED: CPT | Performed by: RADIOLOGY

## 2024-11-07 PROCEDURE — G0279 TOMOSYNTHESIS, MAMMO: HCPCS | Performed by: RADIOLOGY

## 2024-11-07 PROCEDURE — 77065 DX MAMMO INCL CAD UNI: CPT | Performed by: RADIOLOGY

## 2024-11-07 PROCEDURE — 77061 BREAST TOMOSYNTHESIS UNI: CPT | Performed by: RADIOLOGY

## 2024-11-12 ENCOUNTER — APPOINTMENT (OUTPATIENT)
Dept: OTHER | Facility: HOSPITAL | Age: 49
End: 2024-11-12
Payer: COMMERCIAL

## 2024-11-12 ENCOUNTER — APPOINTMENT (OUTPATIENT)
Dept: WOMENS IMAGING | Facility: HOSPITAL | Age: 49
End: 2024-11-12
Payer: COMMERCIAL

## 2024-11-12 ENCOUNTER — LAB REQUISITION (OUTPATIENT)
Dept: LAB | Facility: HOSPITAL | Age: 49
End: 2024-11-12
Payer: COMMERCIAL

## 2024-11-12 DIAGNOSIS — N63.0 UNSPECIFIED LUMP IN UNSPECIFIED BREAST: ICD-10-CM

## 2024-11-12 PROCEDURE — 88341 IMHCHEM/IMCYTCHM EA ADD ANTB: CPT | Performed by: OBSTETRICS & GYNECOLOGY

## 2024-11-12 PROCEDURE — 88305 TISSUE EXAM BY PATHOLOGIST: CPT | Performed by: OBSTETRICS & GYNECOLOGY

## 2024-11-12 PROCEDURE — 88342 IMHCHEM/IMCYTCHM 1ST ANTB: CPT | Performed by: OBSTETRICS & GYNECOLOGY

## 2024-11-12 PROCEDURE — 19083 BX BREAST 1ST LESION US IMAG: CPT | Performed by: RADIOLOGY

## 2024-11-12 PROCEDURE — A4648 IMPLANTABLE TISSUE MARKER: HCPCS | Performed by: RADIOLOGY

## 2024-11-14 ENCOUNTER — DOCUMENTATION (OUTPATIENT)
Dept: SURGERY | Facility: CLINIC | Age: 49
End: 2024-11-14
Payer: COMMERCIAL

## 2024-11-14 NOTE — PROGRESS NOTES
New patient consult from [facility]   Scheduled for 12/18 at 1:00 pm requested by patient  New patient packet mailed SD/ 11/14/24

## 2024-11-15 LAB
CYTO UR: NORMAL
DX PRELIMINARY: NORMAL
LAB AP CASE REPORT: NORMAL
LAB AP DIAGNOSIS COMMENT: NORMAL
LAB AP SPECIAL STAINS: NORMAL
PATH REPORT.FINAL DX SPEC: NORMAL
PATH REPORT.GROSS SPEC: NORMAL

## 2024-12-16 DIAGNOSIS — F41.9 ANXIETY: ICD-10-CM

## 2024-12-16 DIAGNOSIS — F51.01 PRIMARY INSOMNIA: ICD-10-CM

## 2024-12-16 RX ORDER — ZOLPIDEM TARTRATE 10 MG/1
10 TABLET ORAL NIGHTLY PRN
Qty: 90 TABLET | Refills: 1 | Status: SHIPPED | OUTPATIENT
Start: 2024-12-16

## 2024-12-16 RX ORDER — CLONAZEPAM 0.5 MG/1
TABLET ORAL
Qty: 60 TABLET | Refills: 1 | Status: SHIPPED | OUTPATIENT
Start: 2024-12-16

## 2025-01-07 ENCOUNTER — TELEPHONE (OUTPATIENT)
Dept: SURGERY | Facility: CLINIC | Age: 50
End: 2025-01-07

## 2025-01-07 NOTE — TELEPHONE ENCOUNTER
Hub staff attempted to follow warm transfer process and was unsuccessful     Caller: Radha Barry    Relationship to patient: Self    Best call back number: [phone]    Patient is needing: PATIENT IS SCHEDULED WITH CORRINE AT 1:00 TODAY AND WILL NEED TO BE RESCHEDULED- CAN NOT MAKE IT OUT OF [facility]. PLEASE CALL.

## 2025-01-22 NOTE — PROGRESS NOTES
Reason For Visit:   Chief Complaint   Patient presents with    RECHECK     Review MRI results         65 year old  1959      Primary MD: Milton Iglesias        There were no vitals taken for this visit.    Pain Assessment  Patient Currently in Pain: Jesus Domingo, ATC     Subjective     Radha Barry is a 46 y.o.. female.     Pt here today for request for refill on her Ambien and Clonazepam. Pt stating she has been on Ambien for many years and states it takes care of her insomnia. Pt stating she has been on the clonazepam for many years and it works well for her anxiety. Pt denies any side effects/adverse effects with either medication. Pt stating she has been taking the clonazepam daily recently due to her getting a divorce. 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       The following portions of the patient's history were reviewed and updated as appropriate: allergies, current medications, past family history, past medical history, past social history, past surgical history and problem list.    Past Medical History:   Diagnosis Date   • Anxiety    • Endometrial polyp    • GERD (gastroesophageal reflux disease)    • Insomnia    • Ovarian cyst    • PONV (postoperative nausea and vomiting)        Past Surgical History:   Procedure Laterality Date   • BREAST CYST EXCISION     • BREAST SURGERY     •  SECTION     • CHOLECYSTECTOMY     • COLONOSCOPY N/A 2019    Procedure: COLONOSCOPY TO CECUM AND INTO TI WITH COLD BIOSPIES AND HOT SNARE POLYPECTOMIES WITH RESOLUTION CLIP X1;  Surgeon: Jordana Cruz MD;  Location: [facility] ENDOSCOPY;  Service: Gastroenterology   • D & C HYSTEROSCOPY N/A 4/3/2019    Procedure: HYSTERSCOPE DILATATION AND CURETTAGE MYOSURE AND NOVASURE ABLATION;  Surgeon: Abimbola Dumont MD;  [place] [facility] [place] [facility] [place]  Service: Obstetrics/Gynecology   • GALLBLADDER SURGERY     • WISDOM TOOTH EXTRACTION         Review of Systems   Constitutional: Negative.    Respiratory: Negative.    Cardiovascular: Negative.    Psychiatric/Behavioral: Negative for agitation, decreased concentration, dysphoric mood, self-injury, sleep disturbance (on ambien) and suicidal  "ideas. The patient is not nervous/anxious (on clonazepam).        Allergies   Allergen Reactions   • Penicillins Anaphylaxis       Objective     Vitals:    08/17/21 0947   BP: 122/80   Pulse: 71   Temp: 97.1 °F (36.2 °C)   SpO2: 98%   Weight: 99 kg (218 lb 3.2 oz)   Height: 170.2 cm (67\")     Body mass index is 34.17 kg/m².    Physical Exam  Vitals reviewed.   HENT:      Head: Normocephalic.   Eyes:      Pupils: Pupils are equal, round, and reactive to light.   Cardiovascular:      Rate and Rhythm: Normal rate and regular rhythm.   Pulmonary:      Effort: Pulmonary effort is normal.      Breath sounds: Normal breath sounds.   Musculoskeletal:         General: Normal range of motion.   Skin:     General: Skin is warm and dry.   Neurological:      Mental Status: She is alert and oriented to person, place, and time.   Psychiatric:         Behavior: Behavior normal.           Current Outpatient Medications:   •  clonazePAM (KlonoPIN) 0.5 MG tablet, 1 tab po twice a day as needed for anxiety, follow up at least twice yearly, Disp: 60 tablet, Rfl: 0  •  omeprazole (priLOSEC) 40 MG capsule, TAKE 1 CAPSULE BY MOUTH EVERY NIGHT, Disp: 30 capsule, Rfl: 5  •  zolpidem (AMBIEN) 10 MG tablet, Take 1 tablet by mouth At Night As Needed for Sleep. prn, Disp: 30 tablet, Rfl: 0          Assessment/Plan   Diagnoses and all orders for this visit:    1. Anxiety (Primary)  -     clonazePAM (KlonoPIN) 0.5 MG tablet; 1 tab po twice a day as needed for anxiety, follow up at least twice yearly  Dispense: 60 tablet; Refill: 0    2. Primary insomnia  -     zolpidem (AMBIEN) 10 MG tablet; Take 1 tablet by mouth At Night As Needed for Sleep. prn  Dispense: 30 tablet; Refill: 0        Patient Instructions   Anxiety: script for clonazepam 0.5 mg 1 tab po twice a day prn sent to pt's pharmacay; Jacob pulled, reviewed and appropriate. Recommend eating a heart healthy diet, drinking plenty of water and exercising 3-5 times a week for more than 30 " minutes at a time.    Insomnia: script for ambien 10 mg 1 tab daily at night sent to pt's pharmacy; brett pulled, reviewed and appropriate. Recommend having a good night time routine.       Return for schedule fasting labs and then follow up with Dr. Livingston in Sept. for lab follow up and physical.

## 2025-01-29 ENCOUNTER — OFFICE VISIT (OUTPATIENT)
Dept: SURGERY | Facility: CLINIC | Age: 50
End: 2025-01-29
Payer: COMMERCIAL

## 2025-01-29 ENCOUNTER — HOSPITAL ENCOUNTER (OUTPATIENT)
Dept: SURGERY | Facility: HOSPITAL | Age: 50
Discharge: HOME | End: 2025-01-29
Payer: COMMERCIAL

## 2025-01-29 ENCOUNTER — PREP FOR SURGERY (OUTPATIENT)
Dept: OTHER | Facility: HOSPITAL | Age: 50
End: 2025-01-29

## 2025-01-29 VITALS
HEIGHT: 67 IN | DIASTOLIC BLOOD PRESSURE: 80 MMHG | OXYGEN SATURATION: 100 % | HEART RATE: 88 BPM | BODY MASS INDEX: 30.76 KG/M2 | SYSTOLIC BLOOD PRESSURE: 130 MMHG | WEIGHT: 196 LBS | RESPIRATION RATE: 17 BRPM

## 2025-01-29 DIAGNOSIS — R89.7 ABNORMAL BREAST BIOPSY: Primary | ICD-10-CM

## 2025-01-29 DIAGNOSIS — Z91.89 INCREASED RISK OF BREAST CANCER: ICD-10-CM

## 2025-01-29 PROCEDURE — 99204 OFFICE O/P NEW MOD 45 MIN: CPT | Performed by: SURGERY

## 2025-01-29 NOTE — PROGRESS NOTES
[facility]     Referring Provider: Rita Mayo MD     Chief complaint: Abnormal breast biopsy     Subjective   HPI: Ms. Radha Barry is a 50 yo woman, seen at the request of Dr. Rita Mayo, for evaluation after a discordant breast biopsy.  She was called back after screening mammogram for new right breast findings.  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 Prior to the biopsy, she denies any breast lumps, pain, skin changes, or nipple discharge.  She has a past history of bilateral cyst aspirations and needle biopsies, as well as 2 left surgical biopsies and 1 right surgical biopsy.  Most of these were in her 20s.  She does not have any biopsy clips on mammogram aside from the recently placed one. She denies any family history of breast or ovarian cancer.       I personally reviewed her records and summarized her relevant breast history/[id number], Screening MMG with [facility]):  Extremely dense.  No suspicious masses, significant calcifications or other abnormalities are seen.  There is no mammographic evidence of malignancy.  BI-RADS 1: Negative     10/21/2024, Screening MMG with [facility]):  Extremely dense.   There is a focal asymmetry measuring 18 mm with associated architectural distortion seen in the posterior one-third region of the right breast at 10 o'clock located 8 centimeters from the nipple.  In the left breast, no suspicious masses, significant calcifications or other abnormalities are seen.  BI-RADS 0: Incomplete     2024, Right Diagnostic MMG with [facility] & Right Breast US ([facility]):  MMG:   On the present examination, there is a focal asymmetry measuring 14 mm with associated architectural distortion in the posterior one-third region of the right breast at 10 o'clock located 7 centimeters from the nipple.  US:  Ultrasound demonstrates an irregular non-parallel hypoechoic  avascular mass with spiculated margins measuring 8 x 10 x 7 mm in the posterior one-third region of the right breast at 10 o'clock located 7 centimeters from the nipple. There are decreased posterior echoes; edge shadows are excluded.  Visualized axillary lymph nodes are normal.  BI-RADS 4C: Suspicious     11/12/2024, Right Breast, US-Guided Biopsy ([facility]):  1.  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               -No atypical hyperplasia, in situ nor invasive carcinoma identified.  -Pathology results are radiology-pathology DISCORDANT.      Review of Systems   Constitutional:  Negative for appetite change, chills, diaphoresis, fatigue, fever and unexpected weight change.   HENT:   Negative for hearing loss, lump/mass, mouth sores, nosebleeds, sore throat, tinnitus, trouble swallowing and voice change.    Eyes:  Negative for eye problems and icterus.   Respiratory:  Negative for chest tightness, cough, hemoptysis, shortness of breath and wheezing.    Cardiovascular:  Negative for chest pain, leg swelling and palpitations.   Gastrointestinal:  Negative for abdominal distention, abdominal pain, blood in stool, constipation, diarrhea, nausea, rectal pain and vomiting.   Endocrine: Negative for hot flashes.   Genitourinary:  Negative for bladder incontinence, difficulty urinating, dyspareunia, dysuria, frequency, hematuria, menstrual problem, nocturia, pelvic pain, vaginal bleeding and vaginal discharge.    Musculoskeletal:  Negative for arthralgias, back pain, flank pain, gait problem, myalgias, neck pain and neck stiffness.   Skin:  Negative for itching, rash and wound.   Neurological:  Negative for dizziness, extremity weakness, gait problem, headaches, light-headedness, numbness, seizures and speech difficulty.   Hematological:  Negative for adenopathy. Does not bruise/bleed  easily.   Psychiatric/Behavioral:  Negative for confusion, decreased concentration, depression, sleep disturbance and suicidal ideas. The patient is not nervous/anxious.        Medications:    Current Outpatient Medications:     clonazePAM (KlonoPIN) 0.5 MG tablet, 1 p.o. twice daily as needed anxiety., Disp: 60 tablet, Rfl: 1    zolpidem (AMBIEN) 10 MG tablet, Take 1 tablet by mouth At Night As Needed for Sleep., Disp: 90 tablet, Rfl: 1      Allergies   Allergen Reactions    Penicillins Anaphylaxis       Past Medical History:   Diagnosis Date    Anxiety     Endometrial polyp     GERD (gastroesophageal reflux disease)     History of kidney problems     Insomnia     Ovarian cyst     PONV (postoperative nausea and vomiting)        Past Surgical History:   Procedure Laterality Date    BREAST CYST EXCISION      BREAST SURGERY       SECTION      CHOLECYSTECTOMY      COLONOSCOPY N/A 2019    Procedure: COLONOSCOPY TO CECUM AND INTO TI WITH COLD BIOSPIES AND HOT SNARE POLYPECTOMIES WITH RESOLUTION CLIP X1;  Surgeon: Jordana Cruz MD;  Location: [facility] ENDOSCOPY;  Service: Gastroenterology    COLONOSCOPY W/ POLYPECTOMY N/A 2024    Procedure: COLONOSCOPY WITH POLYPECTOMY;  Surgeon: Ángel Aguiar MD;  Location: [facility] OR;  Service: Gastroenterology;  Laterality: N/A;  diverticulosis, sigmoid polyp    D & C HYSTEROSCOPY N/A 2019    Procedure: HYSTERSCOPE DILATATION AND CURETTAGE MYOSURE AND NOVASURE ABLATION;  Surgeon: Abimbola Dumont MD;  [place] [facility] [place] [facility] [place]  Service: Obstetrics/Gynecology    GALLBLADDER SURGERY      TUBAL ABDOMINAL LIGATION      WISDOM TOOTH EXTRACTION         Family History   Problem Relation Age of Onset    Migraines Mother     Diverticulitis Mother     Other (giloblastoma) Father 77    Malig Hyperthermia Neg Hx        Social History     Socioeconomic History    Marital status: Legally     Number of children: 2   Tobacco Use    Smoking status: Former      Current packs/day: 0.00     Average packs/day: 0.5 packs/day for 15.0 years (7.5 ttl pk-yrs)     Types: Cigarettes     Start date:      Quit date: 2008     Years since quittin.0     Passive exposure: Past    Smokeless tobacco: Former   Vaping Use    Vaping status: Never Used   Substance and Sexual Activity    Alcohol use: Yes     Comment: SOCIAL, weekends    Drug use: No    Sexual activity: Defer     Patient drinks 1-2 servings of caffeine per day.       GYNECOLOGIC HISTORY:   . P: 2. AB: 0.  Last menstrual period: 26  Age at menarche: [age]  Age at first childbirth: 31  Lactation/How long: N/a  Age at menopause: Premenopausal  Total years of oral contraceptive use: several years as a teenager   Total years of hormone replacement therapy: 0      Objective   PHYSICAL EXAMINATION  Vitals:    25 1325   BP: 130/80   Pulse: 88   Resp: 17   SpO2: 100%     ECOG 0 - Asymptomatic  General: NAD, well appearing  Psych: a&o x 3, normal mood and affect  Eyes: EOMI, no scleral icterus  ENMT: neck supple without masses or thyromegaly, mucus membranes moist  Resp: normal effort, CTAB  CV: RRR, no murmurs, no edema  GI: soft, NT, ND  MSK: normal gait, normal ROM in bilateral shoulders  Lymph nodes: no cervical, supraclavicular or axillary lymphadenopathy  Breast: symmetric, moderate size, grade 1 ptosis  Right: Medial periareolar scar, otherwise no visible abnormalities on inspection while seated, with arms raised or hands on hips. No masses or nipple abnormalities.  Left: Superior curvilinear scar, otherwise no visible abnormalities on inspection while seated, with arms raised or hands on hips. No masses or nipple abnormalities.    Right breast, in-office ultrasound: At 930, 6 cm FN, globe clip is seen about 1 cm deep to the skin.       I have independently reviewed her imaging and here are my findings:   In the right upper outer breast, there is a round hypoechoic mass located within a band of  dense fibroglandular tissue.  Extremely dense tissue bilaterally on mammogram.      Assessment & Plan   Assessment:   1. 49 y.o. F with a new diagnosis of discordant right breast percutaneous biopsy for which surgical excision is recommended.  2.  She has an increased lifetime risk of breast cancer (26%, TCv8, calculated 1/29/2025).    Discussion:  We discussed the concept of concordance (pathology must explain the imaging findings).  In her case the pathology does not entirely explain the mass.  In this situation surgical excision is recommended for definitive tissue diagnosis.  She is in agreement with this plan. I explained that excisional biopsy would require intraoperative wire-localization. We discussed that should the final pathology be upgraded, she would likely require an additional operation. We reviewed additional risks and potential complications associated with surgery, including, but not limited to, bleeding, infection, deformity or poor cosmetic result, chronic pain, numbness, seroma, hematoma, altered nipple sensation, deep venous thrombosis, and skin flap necrosis. We reviewed postoperative wound care and activity restrictions. She expressed an understanding of these factors and wished to proceed.    We discussed her extremely dense breast tissue and how this affects her lifetime risk of breast cancer. I explained that breast cancer risk is assessed by considering multiple factors, including her family history and personal history. We discussed the various models for calculating breast cancer risk, that none of them are perfect and that some overestimate or underestimate. I calculated her lifetime risk of breast cancer, which is 25.9% (TCv8). We discussed that breast cancer risk needs to be continually reassessed throughout her lifetime.  She qualifies for high risk screening with annual breast MRI and annual mammogram.  I would like to obtain a baseline MRI prior to surgery.     Plan:  -Breast MRI.   I will call her with results.  If this is negative aside from the biopsy site, we will proceed with a right breast needle-localized excisional biopsy (intraoperative localization).    Agnes Mehta MD    I spent 45 minutes caring for Radha on this date of service. This time includes time spent by me in the following activities: preparing for the visit, performing a medically appropriate examination and/or evaluation , counseling and educating the patient/family/caregiver, ordering medications, tests, or procedures, documenting information in the medical record, and independently interpreting results and communicating that information with the patient/family/caregiver.      CC:  MD Adalberto Lo MD Elena Salerno, MD

## 2025-01-29 NOTE — LETTER
2025     Rita Mayo MD  [address]  [address]  [place] [id number]    Patient: Radha Barry   YOB: 1975   Date of Visit: 2025     Dear Rita Mayo MD:       Thank you for referring Radha Barry to me for evaluation. Below are the relevant portions of my assessment and plan of care.    If you have questions, please do not hesitate to call me. I look forward to following Radha along with you.         Sincerely,        Agnes Mehta MD        CC: MD Tyson Ha Stephanie L, MD  25 1431  Sign when Signing Visit  [facility]     Referring Provider: Rita Mayo MD     Chief complaint: Abnormal breast biopsy     Subjective  HPI: Ms. Radha Barry is a 50 yo woman, seen at the request of Dr. Rita Mayo, for evaluation after a discordant breast biopsy.  She was called back after screening mammogram for new right breast findings.  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 Prior to the biopsy, she denies any breast lumps, pain, skin changes, or nipple discharge.  She has a past history of bilateral cyst aspirations and needle biopsies, as well as 2 left surgical biopsies and 1 right surgical biopsy.  Most of these were in her 20s.  She does not have any biopsy clips on mammogram aside from the recently placed one. She denies any family history of breast or ovarian cancer.       I personally reviewed her records and summarized her relevant breast history/[id number], Screening [facility]G [facility]):  Extremely dense.  No suspicious masses, significant calcifications or other abnormalities are seen.  There is no mammographic evidence of malignancy.  BI-RADS 1: Negative     10/21/2024, Screening MMG with [facility]):  Extremely dense.   There is a focal asymmetry measuring 18 mm with associated architectural distortion  seen in the posterior one-third region of the right breast at 10 o'clock located 8 centimeters from the nipple.  In the left breast, no suspicious masses, significant calcifications or other abnormalities are seen.  BI-RADS 0: Incomplete     11/7/2024, Right Diagnostic MMG with [facility] & Right Breast US ([facility]):  MMG:   On the present examination, there is a focal asymmetry measuring 14 mm with associated architectural distortion in the posterior one-third region of the right breast at 10 o'clock located 7 centimeters from the nipple.  US:  Ultrasound demonstrates an irregular non-parallel hypoechoic avascular mass with spiculated margins measuring 8 x 10 x 7 mm in the posterior one-third region of the right breast at 10 o'clock located 7 centimeters from the nipple. There are decreased posterior echoes; edge shadows are excluded.  Visualized axillary lymph nodes are normal.  BI-RADS 4C: Suspicious     11/12/2024, Right Breast, US-Guided Biopsy ([facility]):  1.  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               -No atypical hyperplasia, in situ nor invasive carcinoma identified.  -Pathology results are radiology-pathology DISCORDANT.      Review of Systems   Constitutional:  Negative for appetite change, chills, diaphoresis, fatigue, fever and unexpected weight change.   HENT:   Negative for hearing loss, lump/mass, mouth sores, nosebleeds, sore throat, tinnitus, trouble swallowing and voice change.    Eyes:  Negative for eye problems and icterus.   Respiratory:  Negative for chest tightness, cough, hemoptysis, shortness of breath and wheezing.    Cardiovascular:  Negative for chest pain, leg swelling and palpitations.   Gastrointestinal:  Negative for abdominal distention, abdominal pain, blood in stool, constipation, diarrhea, nausea, rectal pain and vomiting.   Endocrine: Negative  for hot flashes.   Genitourinary:  Negative for bladder incontinence, difficulty urinating, dyspareunia, dysuria, frequency, hematuria, menstrual problem, nocturia, pelvic pain, vaginal bleeding and vaginal discharge.    Musculoskeletal:  Negative for arthralgias, back pain, flank pain, gait problem, myalgias, neck pain and neck stiffness.   Skin:  Negative for itching, rash and wound.   Neurological:  Negative for dizziness, extremity weakness, gait problem, headaches, light-headedness, numbness, seizures and speech difficulty.   Hematological:  Negative for adenopathy. Does not bruise/bleed easily.   Psychiatric/Behavioral:  Negative for confusion, decreased concentration, depression, sleep disturbance and suicidal ideas. The patient is not nervous/anxious.        Medications:    Current Outpatient Medications:   •  clonazePAM (KlonoPIN) 0.5 MG tablet, 1 p.o. twice daily as needed anxiety., Disp: 60 tablet, Rfl: 1  •  zolpidem (AMBIEN) 10 MG tablet, Take 1 tablet by mouth At Night As Needed for Sleep., Disp: 90 tablet, Rfl: 1      Allergies   Allergen Reactions   • Penicillins Anaphylaxis       Past Medical History:   Diagnosis Date   • Anxiety    • Endometrial polyp    • GERD (gastroesophageal reflux disease)    • History of kidney problems    • Insomnia    • Ovarian cyst    • PONV (postoperative nausea and vomiting)        Past Surgical History:   Procedure Laterality Date   • BREAST CYST EXCISION     • BREAST SURGERY     •  SECTION     • CHOLECYSTECTOMY     • COLONOSCOPY N/A 2019    Procedure: COLONOSCOPY TO CECUM AND INTO TI WITH COLD BIOSPIES AND HOT SNARE POLYPECTOMIES WITH RESOLUTION CLIP X1;  Surgeon: Jordana Cruz MD;  Location: [facility] ENDOSCOPY;  Service: Gastroenterology   • COLONOSCOPY W/ POLYPECTOMY N/A 2024    Procedure: COLONOSCOPY WITH POLYPECTOMY;  Surgeon: Ángel Aguiar MD;  Location:  [facility] OR;  Service: Gastroenterology;  Laterality: N/A;  diverticulosis,  sigmoid polyp   • D & C HYSTEROSCOPY N/A 2019    Procedure: HYSTERSCOPE DILATATION AND CURETTAGE MYOSURE AND NOVASURE ABLATION;  Surgeon: Abimbola Dumont MD;  Location[facility];  Service: Obstetrics/Gynecology   • GALLBLADDER SURGERY     • TUBAL ABDOMINAL LIGATION     • WISDOM TOOTH EXTRACTION         Family History   Problem Relation Age of Onset   • Migraines Mother    • Diverticulitis Mother    • Other (giloblastoma) Father 77   • Malig Hyperthermia Neg Hx        Social History     Socioeconomic History   • Marital status: Legally    • Number of children: 2   Tobacco Use   • Smoking status: Former     Current packs/day: 0.00     Average packs/day: 0.5 packs/day for 15.0 years (7.5 ttl pk-yrs)     Types: Cigarettes     Start date:      Quit date: 2008     Years since quittin.0     Passive exposure: Past   • Smokeless tobacco: Former   Vaping Use   • Vaping status: Never Used   Substance and Sexual Activity   • Alcohol use: Yes     Comment: SOCIAL, weekends   • Drug use: No   • Sexual activity: Defer     Patient drinks 1-2 servings of caffeine per day.       GYNECOLOGIC HISTORY:   . P: 2. AB: 0.  Last menstrual period: 26  Age at menarche: 11-12  Age at first childbirth: 31  Lactation/How long: N/a  Age at menopause: Premenopausal  Total years of oral contraceptive use: several years as a teenager   Total years of hormone replacement therapy: 0      Objective  PHYSICAL EXAMINATION  Vitals:    25 1325   BP: 130/80   Pulse: 88   Resp: 17   SpO2: 100%     ECOG 0 - Asymptomatic  General: NAD, well appearing  Psych: a&o x 3, normal mood and affect  Eyes: EOMI, no scleral icterus  ENMT: neck supple without masses or thyromegaly, mucus membranes moist  Resp: normal effort, CTAB  CV: RRR, no murmurs, no edema  GI: soft, NT, ND  MSK: normal gait, normal ROM in bilateral shoulders  Lymph nodes: no cervical, supraclavicular or axillary lymphadenopathy  Breast: symmetric,  moderate size, grade 1 ptosis  Right: Medial periareolar scar, otherwise no visible abnormalities on inspection while seated, with arms raised or hands on hips. No masses or nipple abnormalities.  Left: Superior curvilinear scar, otherwise no visible abnormalities on inspection while seated, with arms raised or hands on hips. No masses or nipple abnormalities.    Right breast, in-office ultrasound: At 930, 6 cm FN, globe clip is seen about 1 cm deep to the skin.       I have independently reviewed her imaging and here are my findings:   In the right upper outer breast, there is a round hypoechoic mass located within a band of dense fibroglandular tissue.  Extremely dense tissue bilaterally on mammogram.      Assessment & Plan  Assessment:   1. 49 y.o. F with a new diagnosis of discordant right breast percutaneous biopsy for which surgical excision is recommended.  2.  She has an increased lifetime risk of breast cancer (26%, TCv8, calculated 1/29/2025).    Discussion:  We discussed the concept of concordance (pathology must explain the imaging findings).  In her case the pathology does not entirely explain the mass.  In this situation surgical excision is recommended for definitive tissue diagnosis.  She is in agreement with this plan. I explained that excisional biopsy would require intraoperative wire-localization. We discussed that should the final pathology be upgraded, she would likely require an additional operation. We reviewed additional risks and potential complications associated with surgery, including, but not limited to, bleeding, infection, deformity or poor cosmetic result, chronic pain, numbness, seroma, hematoma, altered nipple sensation, deep venous thrombosis, and skin flap necrosis. We reviewed postoperative wound care and activity restrictions. She expressed an understanding of these factors and wished to proceed.    We discussed her extremely dense breast tissue and how this affects her  lifetime risk of breast cancer. I explained that breast cancer risk is assessed by considering multiple factors, including her family history and personal history. We discussed the various models for calculating breast cancer risk, that none of them are perfect and that some overestimate or underestimate. I calculated her lifetime risk of breast cancer, which is 25.9% (TCv8). We discussed that breast cancer risk needs to be continually reassessed throughout her lifetime.  She qualifies for high risk screening with annual breast MRI and annual mammogram.  I would like to obtain a baseline MRI prior to surgery.     Plan:  -Breast MRI.  I will call her with results.  If this is negative aside from the biopsy site, we will proceed with a right breast needle-localized excisional biopsy (intraoperative localization).    Agnes Mehta MD    I spent 45 minutes caring for Radha on this date of service. This time includes time spent by me in the following activities: preparing for the visit, performing a medically appropriate examination and/or evaluation , counseling and educating the patient/family/caregiver, ordering medications, tests, or procedures, documenting information in the medical record, and independently interpreting results and communicating that information with the patient/family/caregiver.      CC:  MD Adalberto oL MD Elena Salerno, MD

## 2025-01-30 ENCOUNTER — TELEPHONE (OUTPATIENT)
Dept: SURGERY | Facility: CLINIC | Age: 50
End: 2025-01-30
Payer: COMMERCIAL

## 2025-01-30 DIAGNOSIS — Z00.00 ROUTINE GENERAL MEDICAL EXAMINATION AT A HEALTH CARE FACILITY: Primary | ICD-10-CM

## 2025-01-30 DIAGNOSIS — F41.9 ANXIETY: ICD-10-CM

## 2025-01-30 DIAGNOSIS — Z51.81 ENCOUNTER FOR THERAPEUTIC DRUG MONITORING: ICD-10-CM

## 2025-01-30 NOTE — TELEPHONE ENCOUNTER
Left message- Surgery 2/10/2025 arrive at 7:30 surgery should start around 9:30. Pre Admission testing 2/4/2025 at 12:30.  Post Op 2/21/2025 at 10:30. Asked Patient to call to confirm.

## 2025-01-30 NOTE — TELEPHONE ENCOUNTER
Informed patient that surgery was moved to 2/11/2025 patient needs to arrive be 6 am to start at 8

## 2025-01-31 ENCOUNTER — PATIENT ROUNDING (BHMG ONLY) (OUTPATIENT)
Dept: SURGERY | Facility: CLINIC | Age: 50
End: 2025-01-31
Payer: COMMERCIAL

## 2025-01-31 RX ORDER — CLONAZEPAM 0.5 MG/1
TABLET ORAL
Qty: 60 TABLET | Refills: 0 | Status: SHIPPED | OUTPATIENT
Start: 2025-01-31

## 2025-01-31 NOTE — PROGRESS NOTES
January 31, 2025    Hello, may I speak with Radha Barry?    My name is Jessica      I am  with [facility]  [address] [address] [phone].    Before we get started may I verify your date of birth? 1975    I am calling to officially welcome you to our practice and ask about your recent visit. Is this a good time to talk? No Unable to leave VM    Tell me about your visit with us. What things went well?         We're always looking for ways to make our patients' experiences even better. Do you have recommendations on ways we may improve?      Overall were you satisfied with your first visit to our practice?        I appreciate you taking the time to speak with me today. Is there anything else I can do for you?       Thank you, and have a great day.

## 2025-02-04 ENCOUNTER — PRE-ADMISSION TESTING (OUTPATIENT)
Dept: PREADMISSION TESTING | Facility: HOSPITAL | Age: 50
End: 2025-02-04
Payer: COMMERCIAL

## 2025-02-04 VITALS
BODY MASS INDEX: 31.95 KG/M2 | RESPIRATION RATE: 16 BRPM | WEIGHT: 198.8 LBS | SYSTOLIC BLOOD PRESSURE: 134 MMHG | DIASTOLIC BLOOD PRESSURE: 72 MMHG | TEMPERATURE: 97.7 F | HEIGHT: 66 IN | OXYGEN SATURATION: 100 % | HEART RATE: 94 BPM

## 2025-02-04 DIAGNOSIS — R89.7 ABNORMAL BREAST BIOPSY: ICD-10-CM

## 2025-02-04 LAB
ALBUMIN SERPL-MCNC: 4.1 G/DL (ref 3.5–5.2)
ALBUMIN/GLOB SERPL: 1.4 G/DL
ALP SERPL-CCNC: 53 U/L (ref 39–117)
ALT SERPL W P-5'-P-CCNC: 20 U/L (ref 1–33)
ANION GAP SERPL CALCULATED.3IONS-SCNC: 13.7 MMOL/L (ref 5–15)
ANISOCYTOSIS BLD QL: NORMAL
AST SERPL-CCNC: 19 U/L (ref 1–32)
BACTERIA UR QL AUTO: ABNORMAL /HPF
BASOPHILS # BLD MANUAL: 0 10*3/MM3 (ref 0–0.2)
BASOPHILS NFR BLD MANUAL: 0 % (ref 0–1.5)
BILIRUB SERPL-MCNC: 0.2 MG/DL (ref 0–1.2)
BILIRUB UR QL STRIP: NEGATIVE
BUN SERPL-MCNC: 15 MG/DL (ref 6–20)
BUN/CREAT SERPL: 18.3 (ref 7–25)
CALCIUM SPEC-SCNC: 8.9 MG/DL (ref 8.6–10.5)
CHLORIDE SERPL-SCNC: 106 MMOL/L (ref 98–107)
CHOLEST SERPL-MCNC: 184 MG/DL (ref 0–200)
CLARITY UR: CLEAR
CO2 SERPL-SCNC: 20.3 MMOL/L (ref 22–29)
COLOR UR: YELLOW
CREAT SERPL-MCNC: 0.82 MG/DL (ref 0.57–1)
DEPRECATED RDW RBC AUTO: 39 FL (ref 37–54)
EGFRCR SERPLBLD CKD-EPI 2021: 88.9 ML/MIN/1.73
ELLIPTOCYTES BLD QL SMEAR: NORMAL
EOSINOPHIL # BLD MANUAL: 0.1 10*3/MM3 (ref 0–0.4)
EOSINOPHIL NFR BLD MANUAL: 2 % (ref 0.3–6.2)
ERYTHROCYTE [DISTWIDTH] IN BLOOD BY AUTOMATED COUNT: 15.9 % (ref 12.3–15.4)
GIANT PLATELETS: NORMAL
GLOBULIN UR ELPH-MCNC: 3 GM/DL
GLUCOSE SERPL-MCNC: 116 MG/DL (ref 65–99)
GLUCOSE UR STRIP-MCNC: NEGATIVE MG/DL
HCT VFR BLD AUTO: 25.9 % (ref 34–46.6)
HDLC SERPL-MCNC: 39 MG/DL (ref 40–60)
HGB BLD-MCNC: 7.7 G/DL (ref 12–15.9)
HGB UR QL STRIP.AUTO: NEGATIVE
HYALINE CASTS UR QL AUTO: ABNORMAL /LPF
KETONES UR QL STRIP: NEGATIVE
LDLC SERPL CALC-MCNC: 124 MG/DL (ref 0–100)
LDLC/HDLC SERPL: 3.11 {RATIO}
LEUKOCYTE ESTERASE UR QL STRIP.AUTO: ABNORMAL
LYMPHOCYTES # BLD MANUAL: 1.11 10*3/MM3 (ref 0.7–3.1)
LYMPHOCYTES NFR BLD MANUAL: 7 % (ref 5–12)
MCH RBC QN AUTO: 20.6 PG (ref 26.6–33)
MCHC RBC AUTO-ENTMCNC: 29.7 G/DL (ref 31.5–35.7)
MCV RBC AUTO: 69.4 FL (ref 79–97)
MICROCYTES BLD QL: NORMAL
MONOCYTES # BLD: 0.34 10*3/MM3 (ref 0.1–0.9)
NEUTROPHILS # BLD AUTO: 3.29 10*3/MM3 (ref 1.7–7)
NEUTROPHILS NFR BLD MANUAL: 68 % (ref 42.7–76)
NITRITE UR QL STRIP: NEGATIVE
NRBC BLD AUTO-RTO: 0 /100 WBC (ref 0–0.2)
OVALOCYTES BLD QL SMEAR: NORMAL
PH UR STRIP.AUTO: 5.5 [PH] (ref 5–8)
PLATELET # BLD AUTO: 327 10*3/MM3 (ref 140–450)
PMV BLD AUTO: 10.5 FL (ref 6–12)
POIKILOCYTOSIS BLD QL SMEAR: NORMAL
POLYCHROMASIA BLD QL SMEAR: NORMAL
POTASSIUM SERPL-SCNC: 3.9 MMOL/L (ref 3.5–5.2)
PROT SERPL-MCNC: 7.1 G/DL (ref 6–8.5)
PROT UR QL STRIP: NEGATIVE
QT INTERVAL: 369 MS
QTC INTERVAL: 407 MS
RBC # BLD AUTO: 3.73 10*6/MM3 (ref 3.77–5.28)
RBC # UR STRIP: ABNORMAL /HPF
REF LAB TEST METHOD: ABNORMAL
SODIUM SERPL-SCNC: 140 MMOL/L (ref 136–145)
SP GR UR STRIP: 1.01 (ref 1–1.03)
SQUAMOUS #/AREA URNS HPF: ABNORMAL /HPF
TRIGL SERPL-MCNC: 118 MG/DL (ref 0–150)
UROBILINOGEN UR QL STRIP: ABNORMAL
VARIANT LYMPHS NFR BLD MANUAL: 23 % (ref 19.6–45.3)
VLDLC SERPL-MCNC: 21 MG/DL (ref 5–40)
WBC # UR STRIP: ABNORMAL /HPF
WBC MORPH BLD: NORMAL
WBC NRBC COR # BLD AUTO: 4.84 10*3/MM3 (ref 3.4–10.8)

## 2025-02-04 PROCEDURE — 85025 COMPLETE CBC W/AUTO DIFF WBC: CPT | Performed by: INTERNAL MEDICINE

## 2025-02-04 PROCEDURE — 81001 URINALYSIS AUTO W/SCOPE: CPT | Performed by: INTERNAL MEDICINE

## 2025-02-04 PROCEDURE — 80053 COMPREHEN METABOLIC PANEL: CPT | Performed by: INTERNAL MEDICINE

## 2025-02-04 PROCEDURE — 85007 BL SMEAR W/DIFF WBC COUNT: CPT | Performed by: INTERNAL MEDICINE

## 2025-02-04 PROCEDURE — 36415 COLL VENOUS BLD VENIPUNCTURE: CPT | Performed by: INTERNAL MEDICINE

## 2025-02-04 PROCEDURE — 80061 LIPID PANEL: CPT | Performed by: INTERNAL MEDICINE

## 2025-02-04 PROCEDURE — 80307 DRUG TEST PRSMV CHEM ANLYZR: CPT | Performed by: INTERNAL MEDICINE

## 2025-02-04 PROCEDURE — 93005 ELECTROCARDIOGRAM TRACING: CPT

## 2025-02-04 RX ORDER — OMEPRAZOLE 40 MG/1
40 CAPSULE, DELAYED RELEASE ORAL DAILY
COMMUNITY

## 2025-02-04 RX ORDER — DIPHENHYDRAMINE HCL 25 MG
25 CAPSULE ORAL EVERY 6 HOURS PRN
COMMUNITY

## 2025-02-04 NOTE — DISCHARGE INSTRUCTIONS
Take the following medications the morning of surgery:    PRILOSEC    If you are on prescription narcotic pain medication to control your pain you may also take that medication the morning of surgery.      General Instructions:     Do not eat solid food after midnight the night before surgery.  Clear liquids day of surgery are allowed but must be stopped at least two hours before your hospital arrival time.       Allowed clear liquids      Water, sodas, and tea or coffee with no cream or milk added.       12 to 20 ounces of a clear liquid that contains carbohydrates is recommended.  If non-diabetic, have Gatorade or Powerade.  If diabetic, have G2 or Powerade Zero.     Do not have liquids red in color.  Do not consume chicken, beef, pork or vegetable broth or bouillon cubes of any variety as they are not considered clear liquids and are not allowed.      Infants may have breast milk up to four hours before surgery.  Infants drinking formula may drink formula up to six hours before surgery.   Patients who avoid smoking, chewing tobacco and alcohol for 4 weeks prior to surgery have a reduced risk of post-operative complications.  Quit smoking as many days before surgery as you can.  Do not smoke, use chewing tobacco or drink alcohol the day of surgery.   If applicable bring your C-PAP/ BI-PAP machine in with you to preop day of surgery.  Bring any papers given to you in the doctor’s office.  Wear clean comfortable clothes.  Do not wear contact lenses, false eyelashes or make-up.  Bring a case for your glasses.   Bring crutches or walker if applicable.  Remove all piercings.  Leave jewelry and any other valuables at home.  Hair extensions with metal clips must be removed prior to surgery.  The Pre-Admission Testing nurse will instruct you to bring medications if unable to obtain an accurate list in Pre-Admission Testing.      Preventing a Surgical Site Infection:  For 2 to 3 days before surgery, avoid shaving with a  razor because the razor can irritate skin and make it easier to develop an infection.    Any areas of open skin can increase the risk of a post-operative wound infection by allowing bacteria to enter and travel throughout the body.  Notify your surgeon if you have any skin wounds / rashes even if it is not near the expected surgical site.  The area will need assessed to determine if surgery should be delayed until it is healed.  The night prior to surgery shower using a fresh bar of anti-bacterial soap (such as Dial) and clean washcloth.  Sleep in a clean bed with clean clothing.  Do not allow pets to sleep with you.  Shower on the morning of surgery using a fresh bar of anti-bacterial soap (such as Dial) and clean washcloth.  Dry with a clean towel and dress in clean clothing.  Ask your surgeon if you will be receiving antibiotics prior to surgery.  Make sure you, your family, and all healthcare providers clean their hands with soap and water or an alcohol based hand  before caring for you or your wound.    Day of surgery:  Your arrival time is approximately two hours before your scheduled surgery time.  Please note if you have an early arrival time the surgery doors do not open before 5:00 AM.  Upon arrival, a Pre-op nurse and Anesthesiologist will review your health history, obtain vital signs, and answer questions you may have.  The only belongings needed at this time will be a list of your home medications and if applicable your C-PAP/BI-PAP machine.  A Pre-op nurse will start an IV and you may receive medication in preparation for surgery, including something to help you relax.     Please be aware that surgery does come with discomfort.  We want to make every effort to control your discomfort so please discuss any uncontrolled symptoms with your nurse.   Your doctor will most likely have prescribed pain medications.      If you are going home after surgery you will receive individualized written care  instructions before being discharged.  A responsible adult must drive you to and from the hospital on the day of your surgery and ideally stay with you through the night.   .  Discharge prescriptions can be filled by the hospital pharmacy during regular pharmacy hours.  If you are having surgery late in the day/evening your prescription may be e-prescribed to your pharmacy.  Please verify your pharmacy hours or chose a 24 hour pharmacy to avoid not having access to your prescription because your pharmacy has closed for the day.    If you are staying overnight following surgery, you will be transported to your hospital room following the recovery period.  [facility] has all private rooms.    If you have any questions please call Pre-Admission Testing at [phone].  Deductibles and co-payments are collected on the day of service. Please be prepared to pay the required co-pay, deductible or deposit on the day of service as defined by your plan.    Call your surgeon immediately if you experience any of the following symptoms:  Sore Throat  Shortness of Breath or difficulty breathing  Cough  Chills  Body soreness or muscle pain  Headache  Fever  New loss of taste or smell  Do not arrive for your surgery ill.  Your procedure will need to be rescheduled to another time.  You will need to call your physician before the day of surgery to avoid any unnecessary exposure to hospital staff as well as other patients.

## 2025-02-05 ENCOUNTER — TELEPHONE (OUTPATIENT)
Dept: FAMILY MEDICINE CLINIC | Facility: CLINIC | Age: 50
End: 2025-02-05
Payer: COMMERCIAL

## 2025-02-05 DIAGNOSIS — R89.7 ABNORMAL BREAST BIOPSY: Primary | ICD-10-CM

## 2025-02-05 LAB
AMPHETAMINES UR QL SCN: NEGATIVE NG/ML
BARBITURATES UR QL SCN: NEGATIVE NG/ML
BENZODIAZ UR QL SCN: NEGATIVE NG/ML
BZE UR QL SCN: NEGATIVE NG/ML
CANNABINOIDS UR QL SCN: NEGATIVE NG/ML
CREAT UR-MCNC: 68.2 MG/DL (ref 20–300)
LABORATORY COMMENT REPORT: NORMAL
METHADONE UR QL SCN: NEGATIVE NG/ML
OPIATES UR QL SCN: NEGATIVE NG/ML
OXYCODONE+OXYMORPHONE UR QL SCN: NEGATIVE NG/ML
PCP UR QL: NEGATIVE NG/ML
PH UR: 5.3 [PH] (ref 4.5–8.9)
PROPOXYPH UR QL SCN: NEGATIVE NG/ML

## 2025-02-05 NOTE — TELEPHONE ENCOUNTER
Caller: Radha Barry    Relationship to patient: Self    Best call back number: [phone]     Patient is needing:   PATIENT IS SUPPOSED TO HAVE SURGERY ON HER BREAST THIS TUESDAY 2.11, AND WAS TOLD 2.5 THAT HER HEMOGLOBIN WAS VERY LOW AND IS NEEDING TO BRING IT UP IN ORDER TO HAVE THAT SURGERY.    INQUIRING IF SHE EVEN CAN BRING IT UP THAT FAST, OR IF SHE SHOULD RESCHEDULE. PLEASE CALL ASAP TO DISCUSS.

## 2025-02-06 ENCOUNTER — OFFICE VISIT (OUTPATIENT)
Dept: FAMILY MEDICINE CLINIC | Facility: CLINIC | Age: 50
End: 2025-02-06
Payer: COMMERCIAL

## 2025-02-06 VITALS
SYSTOLIC BLOOD PRESSURE: 126 MMHG | BODY MASS INDEX: 31.74 KG/M2 | OXYGEN SATURATION: 99 % | HEART RATE: 67 BPM | DIASTOLIC BLOOD PRESSURE: 78 MMHG | WEIGHT: 197.5 LBS | RESPIRATION RATE: 16 BRPM | TEMPERATURE: 96.8 F | HEIGHT: 66 IN

## 2025-02-06 DIAGNOSIS — Z00.00 ROUTINE GENERAL MEDICAL EXAMINATION AT A HEALTH CARE FACILITY: Primary | ICD-10-CM

## 2025-02-06 DIAGNOSIS — Z12.11 COLON CANCER SCREENING: ICD-10-CM

## 2025-02-06 NOTE — TELEPHONE ENCOUNTER
"Relay     \"PCP WANTS TO SEE PATIENT. DO NOT SCHEDULE HER WITH ANYONE ELSE. LVM FOR PT TO RETURN CALL. HE HAS OPENINGS TODAY\"                 "

## 2025-02-06 NOTE — LETTER
Controlled Substance Prescribing Agreement          I, Radha Barry [PATIENT],  1975 [] a patient of  Adalberto Livingston MD   [PROVIDER] at [facility] [PRACTICE], have been informed that  individuals who are prescribed certain Controlled Substances including, but not limited to, narcotic pain medicines, stimulants, benzodiazepine tranquilizers, and barbiturate sedatives, can abuse those substances or may allow abuse by others, and have some risk of developing an addictive disorder or suffering a relapse of a prior addiction. Therefore, I have been informed that it is necessary to observe strict rules pertaining to their use, and I agree to follow the terms and procedures described in this Agreement as consideration for, and as a condition of, the willingness of the physician whose signature appears below to consider prescribing or to continue prescribing Controlled Substances to treat my pain.     1. I will inform my physician of any current or past substance abuse, or any current or past substance abuse of any immediate member of my immediate family.     2. I agree that I may be subject to a voluntary evaluation by psychologists and/or psychiatrists, possibly at my own expense, before any Controlled Substances will be prescribed to me. I agree that the need to be evaluated by psychologists and/or psychiatrists may be revisited every three (3) to six (6) months thereafter while taking the medication.     3. All Controlled Substances must come from a provider in the PROVIDER’S PRACTICE. My Controlled Substances will come from the PROVIDER whose signature appears below, or during his or her absence, by the covering provider, unless specific written authorization is obtained from the office for an exception.     4. I will obtain all Controlled Substances from the same pharmacy. Should the need arise to change pharmacies, I will inform the PROVIDER’S office.     5. I will  inform the PROVIDER’S office of any new medications or medical conditions, and of any adverse effects I experience from any of the medications that I take.     6. I will inform my other health care providers that I am taking the Controlled Substances listed above, and of the existence of this Agreement. In the event of an emergency, I will provide the foregoing information to emergency department providers.     7. I agree that my prescribing PROVIDER has permission to discuss all diagnostic and treatment details with other health care providers, pharmacists, or other professionals who provide my health care regarding my use of Controlled Substances for purposes of maintaining accountability.     8. I will not allow anyone else to have, use sell, or otherwise have access to these medications. The sharing of medications with anyone is absolutely forbidden and is against the law.         9. I understand that Controlled Substances may be hazardous or lethal to a person who is not tolerant to their effects, especially a child, and that I must keep them out of reach of such people for their own safety.     10. I understand that tampering with a written prescription is a felony and I will not change or tamper with the PROVIDER’S written prescription.     11. I am aware that attempting to obtain a Controlled Substance under false pretenses is illegal.     12. I agree not to alter my medication in any way, and I will take my medication whole, and it will not be broken, chewed, crushed, injected, or snorted.     13. I will take my medication as instructed and prescribed, and I will not exceed the maximum prescribed dose. Any change in dosage must be approved by the PROVIDER or a physician within the PRACTICE.     14. I understand that these drugs should not be stopped abruptly, as withdrawal syndromes may develop.     15. I will cooperate with unannounced urine or serum toxicology screenings as may be requested, as well as  any random pill counts of medication by the PROVIDER. Failure to comply may result in termination of the PROVIDER-patient relationship.     16. I understand that the presence of unauthorized and/or illegal substances in the screenings described in the paragraph above may prompt referral for assessment for a substance abuse disorder or termination of the PROVIDER-patient relationship.     17. I understand that medications may not be replaced if they are lost, damaged, or stolen. If any of these situations arise that cause me to request an early refill of my medication, a copy of a filed police report or a statement from me explaining the circumstances may be required before additional prescriptions are considered. If I request an early refill secondary to lost, damaged, or stolen prescriptions twice within a year, I may be discharged from the practice.     18. I understand that a prescription may be given early if the PROVIDER or the patient will be out of town when the refill is due. These prescriptions will contain instructions to the pharmacist that the prescriptions(s) may not be filled prior to the appropriate date.     19. If the responsible legal authorities have questions concerning my treatment, as may occur, for example, if I obtained medication at several pharmacies, all confidentiality is waived, and these authorities may be given full access to my full records of Controlled Substances administration.     20. I will keep my scheduled appointments in order to receive medication renewals. If I need to cancel my appointment, I will do so a minimum of twenty-four (24) hours before it is scheduled.     21. I understand that I may be asked to bring my medications in their original container to the PROVIDER’s office while I am on controlled medication.     22. Refills generally will not be given over the phone, after office hours, during the weekends, and on holidays.     23. I understand that any medical  treatment is initially a trial, with the goal of treatment being to improve the quality of life and ability to function and/or work. These parameters will be assessed periodically to determine the benefits of continued therapy, and continued prescription is contingent on whether my physician believes that the medication usage benefits me. I will comply with all treatments as outlined by the PROVIDER.     24. I have been explained the risks and potential benefits of these therapies, including, but not limited to, psychological addiction, physical dependence, withdrawal and over dosage.     25. I understand that failure to adhere to these policies and/or failure to comply with the PROVIDER’S treatment plan may result in cessation of therapy with Controlled Substance prescribing by the PROVIDER or referral for further specialty assessment, as well as possible discharge from the PRACTICE.     26. I, the undersigned patient, attest that the foregoing was discussed with me, and that I have read, fully understand, and agree to all of the above requirements and instructions. I affirm that I have the full right and power to sign and be bound by this  Agreement.         Date:  __________________________________________    Time:  __________________________________________    Patient Printed Name:  _____________________________    Patient Signature:  ________________________________           Date:  __________________________________________    Time:  __________________________________________    Provider Signature:  _______________________________

## 2025-02-07 RX ORDER — NITROFURANTOIN 25; 75 MG/1; MG/1
100 CAPSULE ORAL 2 TIMES DAILY
Qty: 14 CAPSULE | Refills: 0 | Status: SHIPPED | OUTPATIENT
Start: 2025-02-07

## 2025-02-10 LAB
DEVELOPER EXPIRATION DATE: NORMAL
DEVELOPER LOT NUMBER: NORMAL
EXPIRATION DATE: NORMAL
FECAL OCCULT BLOOD SCREEN, POC: NEGATIVE
Lab: NORMAL
NEGATIVE CONTROL: NEGATIVE
POSITIVE CONTROL: POSITIVE

## 2025-02-11 LAB
ALBUMIN SERPL-MCNC: 4.2 G/DL (ref 3.9–4.9)
ALP SERPL-CCNC: 57 IU/L (ref 44–121)
ALT SERPL-CCNC: 14 IU/L (ref 0–32)
AST SERPL-CCNC: 19 IU/L (ref 0–40)
BASOPHILS # BLD AUTO: 0 X10E3/UL (ref 0–0.2)
BASOPHILS NFR BLD AUTO: 1 %
BILIRUB SERPL-MCNC: <0.2 MG/DL (ref 0–1.2)
BUN SERPL-MCNC: 11 MG/DL (ref 6–24)
BUN/CREAT SERPL: 16 (ref 9–23)
CALCIUM SERPL-MCNC: 9.1 MG/DL (ref 8.7–10.2)
CHLORIDE SERPL-SCNC: 106 MMOL/L (ref 96–106)
CHOLEST SERPL-MCNC: 167 MG/DL (ref 100–199)
CO2 SERPL-SCNC: 16 MMOL/L (ref 20–29)
CREAT SERPL-MCNC: 0.67 MG/DL (ref 0.57–1)
EGFRCR SERPLBLD CKD-EPI 2021: 108 ML/MIN/1.73
EOSINOPHIL # BLD AUTO: 0.1 X10E3/UL (ref 0–0.4)
EOSINOPHIL NFR BLD AUTO: 1 %
ERYTHROCYTE [DISTWIDTH] IN BLOOD BY AUTOMATED COUNT: 16.8 % (ref 11.7–15.4)
GLOBULIN SER CALC-MCNC: 2.5 G/DL (ref 1.5–4.5)
GLUCOSE SERPL-MCNC: 105 MG/DL (ref 70–99)
HCT VFR BLD AUTO: 26.7 % (ref 34–46.6)
HDLC SERPL-MCNC: 36 MG/DL
HGB BLD-MCNC: 7.9 G/DL (ref 11.1–15.9)
IMM GRANULOCYTES # BLD AUTO: 0 X10E3/UL (ref 0–0.1)
IMM GRANULOCYTES NFR BLD AUTO: 0 %
LDLC SERPL CALC-MCNC: 90 MG/DL (ref 0–99)
LYMPHOCYTES # BLD AUTO: 2.1 X10E3/UL (ref 0.7–3.1)
LYMPHOCYTES NFR BLD AUTO: 33 %
MCH RBC QN AUTO: 20.8 PG (ref 26.6–33)
MCHC RBC AUTO-ENTMCNC: 29.6 G/DL (ref 31.5–35.7)
MCV RBC AUTO: 70 FL (ref 79–97)
MONOCYTES # BLD AUTO: 0.5 X10E3/UL (ref 0.1–0.9)
MONOCYTES NFR BLD AUTO: 9 %
NEUTROPHILS # BLD AUTO: 3.5 X10E3/UL (ref 1.4–7)
NEUTROPHILS NFR BLD AUTO: 56 %
PLATELET # BLD AUTO: 356 X10E3/UL (ref 150–450)
POTASSIUM SERPL-SCNC: 3.9 MMOL/L (ref 3.5–5.2)
PROT SERPL-MCNC: 6.7 G/DL (ref 6–8.5)
RBC # BLD AUTO: 3.79 X10E6/UL (ref 3.77–5.28)
SODIUM SERPL-SCNC: 137 MMOL/L (ref 134–144)
TRIGL SERPL-MCNC: 242 MG/DL (ref 0–149)
UNABLE TO VOID: NORMAL
VLDLC SERPL CALC-MCNC: 41 MG/DL (ref 5–40)
WBC # BLD AUTO: 6.3 X10E3/UL (ref 3.4–10.8)

## 2025-02-14 NOTE — PROGRESS NOTES
Subjective   Radha Barry is a 49 y.o. female. Patient is here today for   Chief Complaint   Patient presents with    Med Management    Results        History of Present Illness  History of Present Illness  The patient presents for evaluation of anemia.    She reports a decrease in her treadmill running speed, although she does not experience shortness of breath during this activity. However, she experiences significant fatigue when ascending stairs. She also reports episodes of dizziness and headaches. She has been experiencing severe menstrual periods, which have been managed with birth control. The initial birth control medication caused facial rashes, leading to a change in medication in December 2024. This new medication resulted in a prolonged menstrual period of approximately one and a half months, accompanied by blood clots. Consequently, she discontinued the medication. She is currently taking over-the-counter iron supplements, two tablets daily, but reports gastrointestinal discomfort with higher doses. She typically consumes her first meal at night and has a preference for foods high in iron over red meat. She does not take any additional vitamins. She had blood drawn two days ago in preparation for an upcoming surgery. She also reports constipation, which she attributes to her iron supplementation.    FAMILY HISTORY  Her mother is anemic. Her father was diagnosed with glioblastoma in October 2024, which has spread into his spinal fluid and is currently on his third month of chemotherapy.    MEDICATIONS  Current: Iron supplements      Vitals:    02/06/25 1546   BP: 126/78   Pulse: 67   Resp: 16   Temp: 96.8 °F (36 °C)   SpO2: 99%     Body mass index is 31.89 kg/m².    Past Medical History:   Diagnosis Date    Anxiety     Breast mass, right     Endometrial polyp     GERD (gastroesophageal reflux disease)     Insomnia     Ovarian cyst     PONV (postoperative nausea and vomiting)       Allergies    Allergen Reactions    Penicillins Anaphylaxis      Social History     Socioeconomic History    Marital status: Legally     Number of children: 2   Tobacco Use    Smoking status: Former     Current packs/day: 0.00     Average packs/day: 0.5 packs/day for 15.0 years (7.5 ttl pk-yrs)     Types: Cigarettes     Start date:      Quit date: 2008     Years since quittin.0     Passive exposure: Past    Smokeless tobacco: Former   Vaping Use    Vaping status: Never Used   Substance and Sexual Activity    Alcohol use: Yes     Comment: SOCIAL, weekends    Drug use: No    Sexual activity: Defer        Current Outpatient Medications:     clonazePAM (KlonoPIN) 0.5 MG tablet, TAKE 1 BY MOUTH TWICE DAILY AS NEEDED ANXIETY. (Patient taking differently: Take 1 tablet by mouth As Needed. TAKE 1 BY MOUTH TWICE DAILY AS NEEDED ANXIETY.), Disp: 60 tablet, Rfl: 0    diphenhydrAMINE (BENADRYL) 25 mg capsule, Take 1 capsule by mouth Every 6 (Six) Hours As Needed for Itching., Disp: , Rfl:     omeprazole (priLOSEC) 40 MG capsule, Take 1 capsule by mouth Daily., Disp: , Rfl:     zolpidem (AMBIEN) 10 MG tablet, Take 1 tablet by mouth At Night As Needed for Sleep. (Patient taking differently: Take 1 tablet by mouth Every Night.), Disp: 90 tablet, Rfl: 1    nitrofurantoin, macrocrystal-monohydrate, (Macrobid) 100 MG capsule, Take 1 capsule by mouth 2 (Two) Times a Day., Disp: 14 capsule, Rfl: 0     Objective     Review of Systems   Constitutional: Negative.    HENT: Negative.     Respiratory: Negative.     Cardiovascular: Negative.    Musculoskeletal: Negative.    Psychiatric/Behavioral: Negative.         Physical Exam  Vitals and nursing note reviewed.   Constitutional:       General: She is not in acute distress.     Appearance: Normal appearance. She is not ill-appearing, toxic-appearing or diaphoretic.      Comments: Pleasant, neatly groomed, no distress.   Cardiovascular:      Rate and Rhythm: Regular rhythm.    Neurological:      Mental Status: She is alert and oriented to person, place, and time.   Psychiatric:         Mood and Affect: Mood normal.         Behavior: Behavior normal.         Thought Content: Thought content normal.       Physical Exam      Results  Laboratory Studies  Hemoglobin is 7.7. Red blood cells are small.    Assessment & Plan    Problems Addressed this Visit    None  Visit Diagnoses       Routine general medical examination at a health care facility    -  Primary    Relevant Orders    Comprehensive Metabolic Panel    Ferritin    Iron and TIBC    CBC No Differential    Vitamin B12    Folate    Colon cancer screening        Relevant Orders    POCT occult blood x 1 stool (Completed)          Diagnoses         Codes Comments    Routine general medical examination at a health care facility    -  Primary ICD-10-CM: Z00.00  ICD-9-CM: V70.0     Colon cancer screening     ICD-10-CM: Z12.11  ICD-9-CM: V76.51           Assessment & Plan  1. Anemia.  Her hemoglobin level is currently at 7.7, indicating a state of anemia. The presence of microcytic red blood cells suggests an underlying iron deficiency. She has been experiencing symptoms such as dizziness, headaches, and severe fatigue, particularly when walking up stairs. She has also reported heavy menstrual periods with blood clots, which likely contribute to her anemia. She has been advised to continue her current regimen of iron supplements, taking two 26 mg tablets daily, preferably with food to minimize gastrointestinal discomfort. A series of laboratory tests have been ordered, including a complete blood count (CBC) without differential, comprehensive metabolic panel (CMP), vitamin B12, folate, iron, and ferritin levels. Additionally, a fecal occult stool test has been provided for home use. These tests will help confirm the diagnosis and guide further treatment.    Follow-up  The patient will follow up on 02/20/2025.      No follow-ups on  file.    Patient or patient representative verbalized consent for the use of Ambient Listening during the visit with  Adalberto Livingston MD for chart documentation. 2/14/2025  15:00 EST

## 2025-02-20 DIAGNOSIS — F41.9 ANXIETY: ICD-10-CM

## 2025-02-20 DIAGNOSIS — F51.01 PRIMARY INSOMNIA: ICD-10-CM

## 2025-02-20 RX ORDER — ZOLPIDEM TARTRATE 10 MG/1
10 TABLET ORAL NIGHTLY PRN
Qty: 90 TABLET | Refills: 1 | Status: SHIPPED | OUTPATIENT
Start: 2025-02-20

## 2025-02-20 RX ORDER — CLONAZEPAM 0.5 MG/1
TABLET ORAL
Qty: 60 TABLET | Refills: 0 | Status: SHIPPED | OUTPATIENT
Start: 2025-02-20

## 2025-03-06 ENCOUNTER — OFFICE VISIT (OUTPATIENT)
Dept: FAMILY MEDICINE CLINIC | Facility: CLINIC | Age: 50
End: 2025-03-06
Payer: COMMERCIAL

## 2025-03-06 VITALS
RESPIRATION RATE: 16 BRPM | WEIGHT: 201.1 LBS | HEIGHT: 66 IN | SYSTOLIC BLOOD PRESSURE: 140 MMHG | HEART RATE: 83 BPM | BODY MASS INDEX: 32.32 KG/M2 | OXYGEN SATURATION: 99 % | TEMPERATURE: 96.6 F | DIASTOLIC BLOOD PRESSURE: 82 MMHG

## 2025-03-06 DIAGNOSIS — D50.9 IRON DEFICIENCY ANEMIA, UNSPECIFIED IRON DEFICIENCY ANEMIA TYPE: Primary | ICD-10-CM

## 2025-03-06 PROCEDURE — 99213 OFFICE O/P EST LOW 20 MIN: CPT | Performed by: INTERNAL MEDICINE

## 2025-03-07 LAB
ERYTHROCYTE [DISTWIDTH] IN BLOOD BY AUTOMATED COUNT: 19.7 % (ref 11.7–15.4)
FOLATE SERPL-MCNC: 10.3 NG/ML
HCT VFR BLD AUTO: 31.7 % (ref 34–46.6)
HGB BLD-MCNC: 9.1 G/DL (ref 11.1–15.9)
IRON SATN MFR SERPL: 4 % (ref 15–55)
IRON SERPL-MCNC: 23 UG/DL (ref 27–159)
MCH RBC QN AUTO: 21.1 PG (ref 26.6–33)
MCHC RBC AUTO-ENTMCNC: 28.7 G/DL (ref 31.5–35.7)
MCV RBC AUTO: 74 FL (ref 79–97)
PLATELET # BLD AUTO: 305 X10E3/UL (ref 150–450)
RBC # BLD AUTO: 4.31 X10E6/UL (ref 3.77–5.28)
TIBC SERPL-MCNC: 529 UG/DL (ref 250–450)
UIBC SERPL-MCNC: 506 UG/DL (ref 131–425)
VIT B12 SERPL-MCNC: 689 PG/ML (ref 232–1245)
WBC # BLD AUTO: 7.2 X10E3/UL (ref 3.4–10.8)

## 2025-03-07 NOTE — PROGRESS NOTES
Subjective   Radha Barry is a 49 y.o. female. Patient is here today for   Chief Complaint   Patient presents with    Med Management          LABS  Onset was in the past 7 days.   Pertinent negative symptoms include no abdominal pain, no anorexia, no joint pain, no change in stool, no chest pain, no chills, no congestion, no cough, no diaphoresis, no fatigue, no fever, no headaches, no joint swelling, no myalgias, no nausea, no neck pain, no numbness, no rash, no sore throat, no swollen glands, no dysuria, no vertigo, no visual change, no vomiting and no weakness.     History of Present Illness  The patient is a 49-year-old female who presents for evaluation of anemia, elevated blood pressure, elevated triglycerides, and lump in the right breast.    She reports an improvement in her overall health status, attributing this to the initiation of an exercise regimen last week. She has observed a significant enhancement in her respiratory function. She continues to menstruate regularly. She has been compliant with her iron supplementation, which she started approximately one month ago. She reports no adverse effects such as constipation from the iron therapy. She expresses a desire to have her vitamin B12 levels assessed.    She has a small lump on the right side of her right breast, which was previously biopsied and found to be benign. She is scheduled for surgical removal of the lump in the summer. She has been advised by her surgeon to monitor her blood levels prior to the procedure.    She has been experiencing anxiety due to elevated triglyceride levels, which she perceives as a risk factor for myocardial infarction. She has noticed a correlation between her hemoglobin and triglyceride levels, with the latter increasing when the former decreases.    She reports that her blood pressure readings have consistently been within the normal range, except for a single instance of elevation. She speculates that her  current elevated reading may be due to recent consumption of coffee.    FAMILY HISTORY  Her father has a brain tumor.    MEDICATIONS  Current: Ambien, clonazepam, iron      Vitals:    25 1254   BP: 140/82   Pulse: 83   Resp: 16   Temp: 96.6 °F (35.9 °C)   SpO2: 99%     Body mass index is 32.47 kg/m².    Past Medical History:   Diagnosis Date    Anxiety     Breast mass, right     Endometrial polyp     GERD (gastroesophageal reflux disease)     Insomnia     Ovarian cyst     PONV (postoperative nausea and vomiting)       Allergies   Allergen Reactions    Penicillins Anaphylaxis      Social History     Socioeconomic History    Marital status: Legally     Number of children: 2   Tobacco Use    Smoking status: Former     Current packs/day: 0.00     Average packs/day: 0.5 packs/day for 15.0 years (7.5 ttl pk-yrs)     Types: Cigarettes     Start date:      Quit date: 2008     Years since quittin.1     Passive exposure: Past    Smokeless tobacco: Former   Vaping Use    Vaping status: Never Used   Substance and Sexual Activity    Alcohol use: Yes     Comment: SOCIAL, weekends    Drug use: No    Sexual activity: Defer        Current Outpatient Medications:     clonazePAM (KlonoPIN) 0.5 MG tablet, TAKE 1 BY MOUTH TWICE DAILY AS NEEDED ANXIETY., Disp: 60 tablet, Rfl: 0    diphenhydrAMINE (BENADRYL) 25 mg capsule, Take 1 capsule by mouth Every 6 (Six) Hours As Needed for Itching., Disp: , Rfl:     nitrofurantoin, macrocrystal-monohydrate, (Macrobid) 100 MG capsule, Take 1 capsule by mouth 2 (Two) Times a Day., Disp: 14 capsule, Rfl: 0    omeprazole (priLOSEC) 40 MG capsule, Take 1 capsule by mouth Daily., Disp: , Rfl:     zolpidem (AMBIEN) 10 MG tablet, Take 1 tablet by mouth At Night As Needed for Sleep., Disp: 90 tablet, Rfl: 1     Objective     Review of Systems   Constitutional:  Negative for chills, diaphoresis, fatigue and fever.   HENT:  Negative for congestion and sore throat.    Respiratory:   Negative for cough.    Cardiovascular:  Negative for chest pain.   Gastrointestinal:  Negative for abdominal pain, anorexia, nausea and vomiting.   Genitourinary:  Negative for dysuria.   Musculoskeletal:  Negative for joint pain, myalgias and neck pain.   Skin:  Negative for rash.   Neurological:  Negative for vertigo, weakness, numbness and headaches.       Physical Exam  Vitals and nursing note reviewed.   Constitutional:       Appearance: Normal appearance.      Comments: Pleasant, neatly groomed, no distress.   Cardiovascular:      Rate and Rhythm: Regular rhythm.      Heart sounds: Normal heart sounds. No murmur heard.     No gallop.   Pulmonary:      Effort: No respiratory distress.      Breath sounds: Normal breath sounds. No wheezing or rales.   Neurological:      Mental Status: She is alert and oriented to person, place, and time.   Psychiatric:         Mood and Affect: Mood normal.         Behavior: Behavior normal.         Thought Content: Thought content normal.       Physical Exam      Results  Laboratory Studies  Hemoglobin was 7. Triglycerides were 242 on February 10, but 6 days before it was 118.    Assessment & Plan    Problems Addressed this Visit    None  Visit Diagnoses       Iron deficiency anemia, unspecified iron deficiency anemia type    -  Primary    Relevant Orders    CBC (No Diff) (Completed)    Iron and TIBC (Completed)    Vitamin B12 (Completed)    Folate (Completed)          Diagnoses         Codes Comments    Iron deficiency anemia, unspecified iron deficiency anemia type    -  Primary ICD-10-CM: D50.9  ICD-9-CM: 280.9           Assessment & Plan  1. Anemia.  Her hemoglobin level is currently at 7, indicating a need for further investigation. She has been taking iron supplements, which has improved her symptoms. She reports feeling better and has started exercising, which has improved her breathing. A complete blood count (CBC) without differential, iron level, total iron-binding  capacity (TIBC), vitamin B12, folate panel, and methylmalonic acid (MMA) tests will be ordered today to further evaluate her condition.    2. Elevated blood pressure.  Her blood pressure was slightly elevated during this visit, likely due to recent coffee consumption. It will be rechecked.    3. Elevated triglycerides.  Her triglyceride levels were previously recorded at 242 on February 10, but 6 days before it was 118. This fluctuation does not warrant immediate treatment. She was reassured that high triglycerides do not pose the same risk for vascular disease as high cholesterol.    4. Medication management.  She is currently taking Ambien and clonazepam. She was advised to reduce the frequency of these medications or consider halving the dosage to minimize long-term medical liability.    5. Lump in the right breast.  She has a small lump on the right side of her breast, which was previously biopsied and found to be benign. She is scheduled for surgical removal of the lump in the summer. She has been advised by her surgeon to monitor her blood levels prior to the procedure.      No follow-ups on file.    Patient or patient representative verbalized consent for the use of Ambient Listening during the visit with  Adalberto Livingston MD for chart documentation. 3/7/2025  13:18 EST

## 2025-03-26 ENCOUNTER — PRE-ADMISSION TESTING (OUTPATIENT)
Dept: PREADMISSION TESTING | Facility: HOSPITAL | Age: 50
End: 2025-03-26
Payer: COMMERCIAL

## 2025-03-26 VITALS
OXYGEN SATURATION: 99 % | WEIGHT: 204 LBS | TEMPERATURE: 97 F | RESPIRATION RATE: 18 BRPM | SYSTOLIC BLOOD PRESSURE: 122 MMHG | DIASTOLIC BLOOD PRESSURE: 79 MMHG | HEIGHT: 67 IN | BODY MASS INDEX: 32.02 KG/M2

## 2025-03-26 LAB
ANION GAP SERPL CALCULATED.3IONS-SCNC: 13 MMOL/L (ref 5–15)
BASOPHILS # BLD AUTO: 0.02 10*3/MM3 (ref 0–0.2)
BASOPHILS NFR BLD AUTO: 0.3 % (ref 0–1.5)
BUN SERPL-MCNC: 15 MG/DL (ref 6–20)
BUN/CREAT SERPL: 20 (ref 7–25)
CALCIUM SPEC-SCNC: 9.3 MG/DL (ref 8.6–10.5)
CHLORIDE SERPL-SCNC: 104 MMOL/L (ref 98–107)
CO2 SERPL-SCNC: 22 MMOL/L (ref 22–29)
CREAT SERPL-MCNC: 0.75 MG/DL (ref 0.57–1)
DEPRECATED RDW RBC AUTO: 60.1 FL (ref 37–54)
EGFRCR SERPLBLD CKD-EPI 2021: 98.3 ML/MIN/1.73
EOSINOPHIL # BLD AUTO: 0.05 10*3/MM3 (ref 0–0.4)
EOSINOPHIL NFR BLD AUTO: 0.8 % (ref 0.3–6.2)
ERYTHROCYTE [DISTWIDTH] IN BLOOD BY AUTOMATED COUNT: 22.4 % (ref 12.3–15.4)
GLUCOSE SERPL-MCNC: 98 MG/DL (ref 65–99)
HCG SERPL QL: NEGATIVE
HCT VFR BLD AUTO: 33.2 % (ref 34–46.6)
HGB BLD-MCNC: 10 G/DL (ref 12–15.9)
IMM GRANULOCYTES # BLD AUTO: 0.02 10*3/MM3 (ref 0–0.05)
IMM GRANULOCYTES NFR BLD AUTO: 0.3 % (ref 0–0.5)
LYMPHOCYTES # BLD AUTO: 1.6 10*3/MM3 (ref 0.7–3.1)
LYMPHOCYTES NFR BLD AUTO: 24.1 % (ref 19.6–45.3)
MCH RBC QN AUTO: 22.7 PG (ref 26.6–33)
MCHC RBC AUTO-ENTMCNC: 30.1 G/DL (ref 31.5–35.7)
MCV RBC AUTO: 75.3 FL (ref 79–97)
MONOCYTES # BLD AUTO: 0.63 10*3/MM3 (ref 0.1–0.9)
MONOCYTES NFR BLD AUTO: 9.5 % (ref 5–12)
NEUTROPHILS NFR BLD AUTO: 4.32 10*3/MM3 (ref 1.7–7)
NEUTROPHILS NFR BLD AUTO: 65 % (ref 42.7–76)
NRBC BLD AUTO-RTO: 0 /100 WBC (ref 0–0.2)
PLATELET # BLD AUTO: 396 10*3/MM3 (ref 140–450)
PMV BLD AUTO: 9.9 FL (ref 6–12)
POTASSIUM SERPL-SCNC: 4.2 MMOL/L (ref 3.5–5.2)
RBC # BLD AUTO: 4.41 10*6/MM3 (ref 3.77–5.28)
SODIUM SERPL-SCNC: 139 MMOL/L (ref 136–145)
WBC NRBC COR # BLD AUTO: 6.64 10*3/MM3 (ref 3.4–10.8)

## 2025-03-26 PROCEDURE — 85025 COMPLETE CBC W/AUTO DIFF WBC: CPT | Performed by: SURGERY

## 2025-03-26 PROCEDURE — 36415 COLL VENOUS BLD VENIPUNCTURE: CPT

## 2025-03-26 PROCEDURE — 84703 CHORIONIC GONADOTROPIN ASSAY: CPT

## 2025-03-26 PROCEDURE — 80048 BASIC METABOLIC PNL TOTAL CA: CPT

## 2025-03-26 RX ORDER — LEVONORGESTREL AND ETHINYL ESTRADIOL AND FERROUS FUMARATE 0.1-0.02MG
1 KIT ORAL DAILY
COMMUNITY
Start: 2025-03-08

## 2025-03-26 RX ORDER — SULFAMETHOXAZOLE AND TRIMETHOPRIM 800; 160 MG/1; MG/1
1 TABLET ORAL TAKE AS DIRECTED
COMMUNITY
Start: 2025-03-23

## 2025-03-26 NOTE — DISCHARGE INSTRUCTIONS
Take the following medications the morning of surgery:    NO MEDS AM OF SURGERY    If you are on prescription narcotic pain medication to control your pain you may also take that medication the morning of surgery.      General Instructions:     Do not eat solid food after midnight the night before surgery.  Clear liquids day of surgery are allowed but must be stopped at least two hours before your hospital arrival time.       Allowed clear liquids      Water, sodas, and tea or coffee with no cream or milk added.       12 to 20 ounces of a clear liquid that contains carbohydrates is recommended.  If non-diabetic, have Gatorade or Powerade.  If diabetic, have G2 or Powerade Zero.     Do not have liquids red in color.  Do not consume chicken, beef, pork or vegetable broth or bouillon cubes of any variety as they are not considered clear liquids and are not allowed.      Infants may have breast milk up to four hours before surgery.  Infants drinking formula may drink formula up to six hours before surgery.   Patients who avoid smoking, chewing tobacco and alcohol for 4 weeks prior to surgery have a reduced risk of post-operative complications.  Quit smoking as many days before surgery as you can.  Do not smoke, use chewing tobacco or drink alcohol the day of surgery.   If applicable bring your C-PAP/ BI-PAP machine in with you to preop day of surgery.  Bring any papers given to you in the doctor’s office.  Wear clean comfortable clothes.  Do not wear contact lenses, false eyelashes or make-up.  Bring a case for your glasses.   Bring crutches or walker if applicable.  Remove all piercings.  Leave jewelry and any other valuables at home.  Hair extensions with metal clips must be removed prior to surgery.  The Pre-Admission Testing nurse will instruct you to bring medications if unable to obtain an accurate list in Pre-Admission Testing.    Day of surgery you will need to let the preoperative nurse know the last time you  took each of your medications.  To ensure a safe environment for patients and staff, we kindly ask that children under the age of 16 not accompany patients.  If you must bring a dependent child or dependent adult please ensure a responsible adult, other than yourself, is present to supervise them.      If you were given a blood bank ID arm band remember to bring it with you the day of surgery.    Preventing a Surgical Site Infection:  For 2 to 3 days before surgery, avoid shaving with a razor because the razor can irritate skin and make it easier to develop an infection.    Any areas of open skin can increase the risk of a post-operative wound infection by allowing bacteria to enter and travel throughout the body.  Notify your surgeon if you have any skin wounds / rashes even if it is not near the expected surgical site.  The area will need assessed to determine if surgery should be delayed until it is healed.  The night prior to surgery shower using a fresh bar of anti-bacterial soap (such as Dial) and clean washcloth.  Sleep in a clean bed with clean clothing.  Do not allow pets to sleep with you.  Shower on the morning of surgery using a fresh bar of anti-bacterial soap (such as Dial) and clean washcloth.  Dry with a clean towel and dress in clean clothing.  Ask your surgeon if you will be receiving antibiotics prior to surgery.  Make sure you, your family, and all healthcare providers clean their hands with soap and water or an alcohol based hand  before caring for you or your wound.    Day of surgery:  Your arrival time is approximately two hours before your scheduled surgery time.  Please note if you have an early arrival time the surgery doors do not open before 5:00 AM.  Upon arrival, a Pre-op nurse and Anesthesiologist will review your health history, obtain vital signs, and answer questions you may have.  The only belongings needed at this time will be a list of your home medications and if  applicable your C-PAP/BI-PAP machine.  A Pre-op nurse will start an IV and you may receive medication in preparation for surgery, including something to help you relax.     Please be aware that surgery does come with discomfort.  We want to make every effort to control your discomfort so please discuss any uncontrolled symptoms with your nurse.   Your doctor will most likely have prescribed pain medications.      If you are going home after surgery you will receive individualized written care instructions before being discharged.  A responsible adult must drive you to and from the hospital on the day of your surgery and ideally stay with you through the night.   .  Discharge prescriptions can be filled by the hospital pharmacy during regular pharmacy hours.  If you are having surgery late in the day/evening your prescription may be e-prescribed to your pharmacy.  Please verify your pharmacy hours or chose a 24 hour pharmacy to avoid not having access to your prescription because your pharmacy has closed for the day.    If you are staying overnight following surgery, you will be transported to your hospital room following the recovery period.  [facility] has all private rooms.    If you have any questions please call Pre-Admission Testing at [phone].  Deductibles and co-payments are collected on the day of service. Please be prepared to pay the required co-pay, deductible or deposit on the day of service as defined by your plan.    Call your surgeon immediately if you experience any of the following symptoms:  Sore Throat  Shortness of Breath or difficulty breathing  Cough  Chills  Body soreness or muscle pain  Headache  Fever  New loss of taste or smell  Do not arrive for your surgery ill.  Your procedure will need to be rescheduled to another time.  You will need to call your physician before the day of surgery to avoid any unnecessary exposure to hospital staff as well as other patients.

## 2025-03-31 DIAGNOSIS — F51.01 PRIMARY INSOMNIA: ICD-10-CM

## 2025-03-31 DIAGNOSIS — F41.9 ANXIETY: ICD-10-CM

## 2025-03-31 RX ORDER — CLONAZEPAM 0.5 MG/1
0.5 TABLET ORAL 2 TIMES DAILY PRN
Qty: 30 TABLET | Refills: 1 | Status: SHIPPED | OUTPATIENT
Start: 2025-03-31

## 2025-03-31 RX ORDER — ZOLPIDEM TARTRATE 10 MG/1
10 TABLET ORAL NIGHTLY PRN
Qty: 90 TABLET | Refills: 1 | Status: SHIPPED | OUTPATIENT
Start: 2025-03-31

## 2025-04-01 ENCOUNTER — APPOINTMENT (OUTPATIENT)
Dept: GENERAL RADIOLOGY | Facility: HOSPITAL | Age: 50
End: 2025-04-01
Payer: COMMERCIAL

## 2025-04-01 ENCOUNTER — ANESTHESIA (OUTPATIENT)
Dept: PERIOP | Facility: HOSPITAL | Age: 50
End: 2025-04-01
Payer: COMMERCIAL

## 2025-04-01 ENCOUNTER — ANESTHESIA EVENT (OUTPATIENT)
Dept: PERIOP | Facility: HOSPITAL | Age: 50
End: 2025-04-01
Payer: COMMERCIAL

## 2025-04-01 ENCOUNTER — HOSPITAL ENCOUNTER (OUTPATIENT)
Facility: HOSPITAL | Age: 50
Setting detail: HOSPITAL OUTPATIENT SURGERY
Discharge: HOME | End: 2025-04-01
Attending: SURGERY | Admitting: SURGERY
Payer: COMMERCIAL

## 2025-04-01 VITALS
HEART RATE: 75 BPM | OXYGEN SATURATION: 93 % | TEMPERATURE: 97.8 F | DIASTOLIC BLOOD PRESSURE: 81 MMHG | SYSTOLIC BLOOD PRESSURE: 127 MMHG | RESPIRATION RATE: 18 BRPM

## 2025-04-01 DIAGNOSIS — R89.7 ABNORMAL BREAST BIOPSY: ICD-10-CM

## 2025-04-01 PROCEDURE — 25810000003 LACTATED RINGERS PER 1000 ML: Performed by: ANESTHESIOLOGY

## 2025-04-01 PROCEDURE — 19125 EXCISION BREAST LESION: CPT | Performed by: SURGERY

## 2025-04-01 PROCEDURE — 25010000002 FAMOTIDINE 10 MG/ML SOLUTION: Performed by: ANESTHESIOLOGY

## 2025-04-01 PROCEDURE — S0260 H&P FOR SURGERY: HCPCS | Performed by: SURGERY

## 2025-04-01 PROCEDURE — 25010000002 CEFAZOLIN PER 500 MG: Performed by: SURGERY

## 2025-04-01 PROCEDURE — 25010000002 FENTANYL CITRATE (PF) 50 MCG/ML SOLUTION: Performed by: NURSE ANESTHETIST, CERTIFIED REGISTERED

## 2025-04-01 PROCEDURE — 25010000002 GLYCOPYRROLATE 1 MG/5ML SOLUTION: Performed by: NURSE ANESTHETIST, CERTIFIED REGISTERED

## 2025-04-01 PROCEDURE — 25010000002 MIDAZOLAM PER 1 MG: Performed by: NURSE ANESTHETIST, CERTIFIED REGISTERED

## 2025-04-01 PROCEDURE — C1819 TISSUE LOCALIZATION-EXCISION: HCPCS | Performed by: SURGERY

## 2025-04-01 PROCEDURE — 25010000002 LIDOCAINE 2% SOLUTION: Performed by: NURSE ANESTHETIST, CERTIFIED REGISTERED

## 2025-04-01 PROCEDURE — 76098 X-RAY EXAM SURGICAL SPECIMEN: CPT

## 2025-04-01 PROCEDURE — 19285 PERQ DEV BREAST 1ST US IMAG: CPT | Performed by: SURGERY

## 2025-04-01 PROCEDURE — 25010000002 HYDROMORPHONE 1 MG/ML SOLUTION: Performed by: NURSE ANESTHETIST, CERTIFIED REGISTERED

## 2025-04-01 PROCEDURE — 25010000002 DEXAMETHASONE SODIUM PHOSPHATE 20 MG/5ML SOLUTION: Performed by: NURSE ANESTHETIST, CERTIFIED REGISTERED

## 2025-04-01 PROCEDURE — 25010000002 PROPOFOL 10 MG/ML EMULSION: Performed by: NURSE ANESTHETIST, CERTIFIED REGISTERED

## 2025-04-01 PROCEDURE — 25010000002 ONDANSETRON PER 1 MG: Performed by: NURSE ANESTHETIST, CERTIFIED REGISTERED

## 2025-04-01 PROCEDURE — 25010000002 HYDROMORPHONE PER 4 MG: Performed by: NURSE ANESTHETIST, CERTIFIED REGISTERED

## 2025-04-01 PROCEDURE — 88307 TISSUE EXAM BY PATHOLOGIST: CPT | Performed by: SURGERY

## 2025-04-01 RX ORDER — DEXAMETHASONE SODIUM PHOSPHATE 4 MG/ML
INJECTION, SOLUTION INTRA-ARTICULAR; INTRALESIONAL; INTRAMUSCULAR; INTRAVENOUS; SOFT TISSUE AS NEEDED
Status: DISCONTINUED | OUTPATIENT
Start: 2025-04-01 | End: 2025-04-01 | Stop reason: SURG

## 2025-04-01 RX ORDER — GLYCOPYRROLATE 0.2 MG/ML
INJECTION INTRAMUSCULAR; INTRAVENOUS AS NEEDED
Status: DISCONTINUED | OUTPATIENT
Start: 2025-04-01 | End: 2025-04-01 | Stop reason: SURG

## 2025-04-01 RX ORDER — PROMETHAZINE HYDROCHLORIDE 25 MG/1
25 TABLET ORAL ONCE AS NEEDED
Status: DISCONTINUED | OUTPATIENT
Start: 2025-04-01 | End: 2025-04-01 | Stop reason: HOSPADM

## 2025-04-01 RX ORDER — FLUMAZENIL 0.1 MG/ML
0.2 INJECTION INTRAVENOUS AS NEEDED
Status: DISCONTINUED | OUTPATIENT
Start: 2025-04-01 | End: 2025-04-01 | Stop reason: HOSPADM

## 2025-04-01 RX ORDER — EPHEDRINE SULFATE 50 MG/ML
5 INJECTION, SOLUTION INTRAVENOUS ONCE AS NEEDED
Status: DISCONTINUED | OUTPATIENT
Start: 2025-04-01 | End: 2025-04-01 | Stop reason: HOSPADM

## 2025-04-01 RX ORDER — SODIUM CHLORIDE 0.9 % (FLUSH) 0.9 %
3 SYRINGE (ML) INJECTION EVERY 12 HOURS SCHEDULED
Status: DISCONTINUED | OUTPATIENT
Start: 2025-04-01 | End: 2025-04-01 | Stop reason: HOSPADM

## 2025-04-01 RX ORDER — FAMOTIDINE 10 MG/ML
20 INJECTION, SOLUTION INTRAVENOUS ONCE
Status: COMPLETED | OUTPATIENT
Start: 2025-04-01 | End: 2025-04-01

## 2025-04-01 RX ORDER — PROMETHAZINE HYDROCHLORIDE 25 MG/1
25 SUPPOSITORY RECTAL ONCE AS NEEDED
Status: DISCONTINUED | OUTPATIENT
Start: 2025-04-01 | End: 2025-04-01 | Stop reason: HOSPADM

## 2025-04-01 RX ORDER — MIDAZOLAM HYDROCHLORIDE 1 MG/ML
1 INJECTION, SOLUTION INTRAMUSCULAR; INTRAVENOUS
Status: DISCONTINUED | OUTPATIENT
Start: 2025-04-01 | End: 2025-04-01 | Stop reason: HOSPADM

## 2025-04-01 RX ORDER — TRAMADOL HYDROCHLORIDE 50 MG/1
50 TABLET ORAL EVERY 8 HOURS PRN
Qty: 6 TABLET | Refills: 0 | Status: SHIPPED | OUTPATIENT
Start: 2025-04-01 | End: 2025-04-01 | Stop reason: HOSPADM

## 2025-04-01 RX ORDER — MIDAZOLAM HYDROCHLORIDE 1 MG/ML
INJECTION, SOLUTION INTRAMUSCULAR; INTRAVENOUS AS NEEDED
Status: DISCONTINUED | OUTPATIENT
Start: 2025-04-01 | End: 2025-04-01 | Stop reason: SURG

## 2025-04-01 RX ORDER — DROPERIDOL 2.5 MG/ML
0.62 INJECTION, SOLUTION INTRAMUSCULAR; INTRAVENOUS
Status: DISCONTINUED | OUTPATIENT
Start: 2025-04-01 | End: 2025-04-01 | Stop reason: HOSPADM

## 2025-04-01 RX ORDER — SODIUM CHLORIDE, SODIUM LACTATE, POTASSIUM CHLORIDE, CALCIUM CHLORIDE 600; 310; 30; 20 MG/100ML; MG/100ML; MG/100ML; MG/100ML
9 INJECTION, SOLUTION INTRAVENOUS CONTINUOUS
Status: DISCONTINUED | OUTPATIENT
Start: 2025-04-01 | End: 2025-04-01 | Stop reason: HOSPADM

## 2025-04-01 RX ORDER — SODIUM CHLORIDE 0.9 % (FLUSH) 0.9 %
3-10 SYRINGE (ML) INJECTION AS NEEDED
Status: DISCONTINUED | OUTPATIENT
Start: 2025-04-01 | End: 2025-04-01 | Stop reason: HOSPADM

## 2025-04-01 RX ORDER — HYDROMORPHONE HYDROCHLORIDE 1 MG/ML
0.5 INJECTION, SOLUTION INTRAMUSCULAR; INTRAVENOUS; SUBCUTANEOUS
Status: DISCONTINUED | OUTPATIENT
Start: 2025-04-01 | End: 2025-04-01 | Stop reason: HOSPADM

## 2025-04-01 RX ORDER — NALOXONE HCL 0.4 MG/ML
0.2 VIAL (ML) INJECTION AS NEEDED
Status: DISCONTINUED | OUTPATIENT
Start: 2025-04-01 | End: 2025-04-01 | Stop reason: HOSPADM

## 2025-04-01 RX ORDER — BUPIVACAINE HYDROCHLORIDE AND EPINEPHRINE 2.5; 5 MG/ML; UG/ML
INJECTION, SOLUTION INFILTRATION; PERINEURAL AS NEEDED
Status: DISCONTINUED | OUTPATIENT
Start: 2025-04-01 | End: 2025-04-01 | Stop reason: HOSPADM

## 2025-04-01 RX ORDER — PROPOFOL 10 MG/ML
VIAL (ML) INTRAVENOUS AS NEEDED
Status: DISCONTINUED | OUTPATIENT
Start: 2025-04-01 | End: 2025-04-01 | Stop reason: SURG

## 2025-04-01 RX ORDER — DIPHENHYDRAMINE HYDROCHLORIDE 50 MG/ML
12.5 INJECTION, SOLUTION INTRAMUSCULAR; INTRAVENOUS
Status: DISCONTINUED | OUTPATIENT
Start: 2025-04-01 | End: 2025-04-01 | Stop reason: HOSPADM

## 2025-04-01 RX ORDER — HYDRALAZINE HYDROCHLORIDE 20 MG/ML
5 INJECTION INTRAMUSCULAR; INTRAVENOUS
Status: DISCONTINUED | OUTPATIENT
Start: 2025-04-01 | End: 2025-04-01 | Stop reason: HOSPADM

## 2025-04-01 RX ORDER — HYDROCODONE BITARTRATE AND ACETAMINOPHEN 5; 325 MG/1; MG/1
1 TABLET ORAL EVERY 6 HOURS PRN
Qty: 6 TABLET | Refills: 0 | Status: SHIPPED | OUTPATIENT
Start: 2025-04-01

## 2025-04-01 RX ORDER — LIDOCAINE HYDROCHLORIDE 10 MG/ML
0.5 INJECTION, SOLUTION INFILTRATION; PERINEURAL ONCE AS NEEDED
Status: DISCONTINUED | OUTPATIENT
Start: 2025-04-01 | End: 2025-04-01 | Stop reason: HOSPADM

## 2025-04-01 RX ORDER — LIDOCAINE HYDROCHLORIDE 20 MG/ML
INJECTION, SOLUTION INFILTRATION; PERINEURAL AS NEEDED
Status: DISCONTINUED | OUTPATIENT
Start: 2025-04-01 | End: 2025-04-01 | Stop reason: SURG

## 2025-04-01 RX ORDER — MAGNESIUM HYDROXIDE 1200 MG/15ML
LIQUID ORAL AS NEEDED
Status: DISCONTINUED | OUTPATIENT
Start: 2025-04-01 | End: 2025-04-01 | Stop reason: HOSPADM

## 2025-04-01 RX ORDER — ACETAMINOPHEN 500 MG
1000 TABLET ORAL EVERY 8 HOURS
Qty: 30 TABLET | Refills: 0 | Status: SHIPPED | OUTPATIENT
Start: 2025-04-01 | End: 2025-04-06

## 2025-04-01 RX ORDER — FENTANYL CITRATE 50 UG/ML
50 INJECTION, SOLUTION INTRAMUSCULAR; INTRAVENOUS ONCE AS NEEDED
Status: DISCONTINUED | OUTPATIENT
Start: 2025-04-01 | End: 2025-04-01 | Stop reason: HOSPADM

## 2025-04-01 RX ORDER — ONDANSETRON 2 MG/ML
4 INJECTION INTRAMUSCULAR; INTRAVENOUS ONCE AS NEEDED
Status: COMPLETED | OUTPATIENT
Start: 2025-04-01 | End: 2025-04-01

## 2025-04-01 RX ORDER — HYDROCODONE BITARTRATE AND ACETAMINOPHEN 5; 325 MG/1; MG/1
1 TABLET ORAL ONCE AS NEEDED
Status: COMPLETED | OUTPATIENT
Start: 2025-04-01 | End: 2025-04-01

## 2025-04-01 RX ORDER — OXYCODONE AND ACETAMINOPHEN 7.5; 325 MG/1; MG/1
1 TABLET ORAL EVERY 4 HOURS PRN
Status: DISCONTINUED | OUTPATIENT
Start: 2025-04-01 | End: 2025-04-01 | Stop reason: HOSPADM

## 2025-04-01 RX ORDER — ATROPINE SULFATE 0.4 MG/ML
0.4 INJECTION, SOLUTION INTRAMUSCULAR; INTRAVENOUS; SUBCUTANEOUS ONCE AS NEEDED
Status: DISCONTINUED | OUTPATIENT
Start: 2025-04-01 | End: 2025-04-01 | Stop reason: HOSPADM

## 2025-04-01 RX ORDER — SCOPOLAMINE 1 MG/3D
1 PATCH, EXTENDED RELEASE TRANSDERMAL ONCE
Status: DISCONTINUED | OUTPATIENT
Start: 2025-04-01 | End: 2025-04-01 | Stop reason: HOSPADM

## 2025-04-01 RX ORDER — FENTANYL CITRATE 50 UG/ML
INJECTION, SOLUTION INTRAMUSCULAR; INTRAVENOUS AS NEEDED
Status: DISCONTINUED | OUTPATIENT
Start: 2025-04-01 | End: 2025-04-01 | Stop reason: SURG

## 2025-04-01 RX ORDER — ONDANSETRON 2 MG/ML
INJECTION INTRAMUSCULAR; INTRAVENOUS AS NEEDED
Status: DISCONTINUED | OUTPATIENT
Start: 2025-04-01 | End: 2025-04-01 | Stop reason: SURG

## 2025-04-01 RX ORDER — LABETALOL HYDROCHLORIDE 5 MG/ML
5 INJECTION, SOLUTION INTRAVENOUS
Status: DISCONTINUED | OUTPATIENT
Start: 2025-04-01 | End: 2025-04-01 | Stop reason: HOSPADM

## 2025-04-01 RX ORDER — FENTANYL CITRATE 50 UG/ML
50 INJECTION, SOLUTION INTRAMUSCULAR; INTRAVENOUS
Status: DISCONTINUED | OUTPATIENT
Start: 2025-04-01 | End: 2025-04-01 | Stop reason: HOSPADM

## 2025-04-01 RX ORDER — IPRATROPIUM BROMIDE AND ALBUTEROL SULFATE 2.5; .5 MG/3ML; MG/3ML
3 SOLUTION RESPIRATORY (INHALATION) ONCE AS NEEDED
Status: DISCONTINUED | OUTPATIENT
Start: 2025-04-01 | End: 2025-04-01 | Stop reason: HOSPADM

## 2025-04-01 RX ADMIN — ONDANSETRON 4 MG: 2 INJECTION, SOLUTION INTRAMUSCULAR; INTRAVENOUS at 09:40

## 2025-04-01 RX ADMIN — PROPOFOL 50 MG: 10 INJECTION, EMULSION INTRAVENOUS at 08:08

## 2025-04-01 RX ADMIN — ONDANSETRON 4 MG: 2 INJECTION, SOLUTION INTRAMUSCULAR; INTRAVENOUS at 09:00

## 2025-04-01 RX ADMIN — FAMOTIDINE 20 MG: 10 INJECTION, SOLUTION INTRAVENOUS at 07:06

## 2025-04-01 RX ADMIN — GLYCOPYRROLATE 0.2 MG: 0.2 INJECTION, SOLUTION INTRAMUSCULAR; INTRAVENOUS at 07:59

## 2025-04-01 RX ADMIN — FENTANYL CITRATE 50 MCG: 50 INJECTION, SOLUTION INTRAMUSCULAR; INTRAVENOUS at 08:07

## 2025-04-01 RX ADMIN — MIDAZOLAM HYDROCHLORIDE 2 MG: 1 INJECTION, SOLUTION INTRAMUSCULAR; INTRAVENOUS at 07:59

## 2025-04-01 RX ADMIN — HYDROCODONE BITARTRATE AND ACETAMINOPHEN 1 TABLET: 5; 325 TABLET ORAL at 09:55

## 2025-04-01 RX ADMIN — SODIUM CHLORIDE 2000 MG: 900 INJECTION INTRAVENOUS at 07:51

## 2025-04-01 RX ADMIN — PROPOFOL 175 MCG/KG/MIN: 10 INJECTION, EMULSION INTRAVENOUS at 08:05

## 2025-04-01 RX ADMIN — PROPOFOL 200 MG: 10 INJECTION, EMULSION INTRAVENOUS at 08:03

## 2025-04-01 RX ADMIN — LIDOCAINE HYDROCHLORIDE 100 MG: 20 INJECTION, SOLUTION INFILTRATION; PERINEURAL at 08:03

## 2025-04-01 RX ADMIN — HYDROMORPHONE HYDROCHLORIDE 0.5 MG: 1 INJECTION, SOLUTION INTRAMUSCULAR; INTRAVENOUS; SUBCUTANEOUS at 09:41

## 2025-04-01 RX ADMIN — DEXAMETHASONE SODIUM PHOSPHATE 10 MG: 4 INJECTION, SOLUTION INTRAMUSCULAR; INTRAVENOUS at 08:08

## 2025-04-01 RX ADMIN — SODIUM CHLORIDE, POTASSIUM CHLORIDE, SODIUM LACTATE AND CALCIUM CHLORIDE: 600; 310; 30; 20 INJECTION, SOLUTION INTRAVENOUS at 09:15

## 2025-04-01 RX ADMIN — SODIUM CHLORIDE, POTASSIUM CHLORIDE, SODIUM LACTATE AND CALCIUM CHLORIDE 9 ML/HR: 600; 310; 30; 20 INJECTION, SOLUTION INTRAVENOUS at 07:07

## 2025-04-01 RX ADMIN — HYDROMORPHONE HYDROCHLORIDE 0.5 MG: 1 INJECTION, SOLUTION INTRAMUSCULAR; INTRAVENOUS; SUBCUTANEOUS at 08:32

## 2025-04-01 RX ADMIN — PROPOFOL 50 MG: 10 INJECTION, EMULSION INTRAVENOUS at 08:25

## 2025-04-01 NOTE — ANESTHESIA PROCEDURE NOTES
Airway  Reason: elective    Date/Time: 4/1/2025 8:04 AM  Airway not difficult    General Information and Staff    Patient location during procedure: OR  Anesthesiologist: Helio Mix MD  CRNA/CAA: Bud Thomason CRNA    Indications and Patient Condition  Indications for airway management: airway protection    Preoxygenated: yes    Mask difficulty assessment: 0 - not attempted    Final Airway Details    Final airway type: supraglottic airway      Successful airway: LMA and unique  Size: 4   Number of attempts at approach: 1  Assessment: lips, teeth, and gum same as pre-op and atraumatic intubation

## 2025-04-01 NOTE — H&P
BREAST SURGERY HISTORY & PHYSICAL        Chief complaint: Abnormal breast biopsy     Subjective  HPI:   25:  Ms. Radha Barry is a 48 yo woman, seen at the request of Dr. Rita Mayo, for evaluation after a discordant breast biopsy.  She was called back after screening mammogram for new right breast findings.  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 Prior to the biopsy, she denies any breast lumps, pain, skin changes, or nipple discharge.  She has a past history of bilateral cyst aspirations and needle biopsies, as well as 2 left surgical biopsies and 1 right surgical biopsy.  Most of these were in her 20s.  She does not have any biopsy clips on mammogram aside from the recently placed one. She denies any family history of breast or ovarian cancer.     25, Interval History:  She presents today for surgery.  She denies any new complaints.        I personally reviewed her records and summarized her relevant breast history/[id number], Screening MMG with [facility]):  Extremely dense.  No suspicious masses, significant calcifications or other abnormalities are seen.  There is no mammographic evidence of malignancy.  BI-RADS 1: Negative      10/21/2024, Screening MMG with [facility] ([facility]):  Extremely dense.   There is a focal asymmetry measuring 18 mm with associated architectural distortion seen in the posterior one-third region of the right breast at 10 o'clock located 8 centimeters from the nipple.  In the left breast, no suspicious masses, significant calcifications or other abnormalities are seen.  BI-RADS 0: Incomplete      2024, Right Diagnostic MMG with [facility] & Right Breast US ([facility]):  MMG:   On the present examination, there is a focal asymmetry measuring 14 mm with associated architectural distortion in the posterior one-third region of the right breast at 10 o'clock located 7 centimeters from  the nipple.  US:  Ultrasound demonstrates an irregular non-parallel hypoechoic avascular mass with spiculated margins measuring 8 x 10 x 7 mm in the posterior one-third region of the right breast at 10 o'clock located 7 centimeters from the nipple. There are decreased posterior echoes; edge shadows are excluded.  Visualized axillary lymph nodes are normal.  BI-RADS 4C: Suspicious      11/12/2024, Right Breast, US-Guided Biopsy ([facility]):  1.  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               -No atypical hyperplasia, in situ nor invasive carcinoma identified.  -Pathology results are radiology-pathology DISCORDANT.        Review of Systems   Constitutional:  Negative for appetite change, chills, diaphoresis, fatigue, fever and unexpected weight change.   HENT:   Negative for hearing loss, lump/mass, mouth sores, nosebleeds, sore throat, tinnitus, trouble swallowing and voice change.    Eyes:  Negative for eye problems and icterus.   Respiratory:  Negative for chest tightness, cough, hemoptysis, shortness of breath and wheezing.    Cardiovascular:  Negative for chest pain, leg swelling and palpitations.   Gastrointestinal:  Negative for abdominal distention, abdominal pain, blood in stool, constipation, diarrhea, nausea, rectal pain and vomiting.   Endocrine: Negative for hot flashes.   Genitourinary:  Negative for bladder incontinence, difficulty urinating, dyspareunia, dysuria, frequency, hematuria, menstrual problem, nocturia, pelvic pain, vaginal bleeding and vaginal discharge.    Musculoskeletal:  Negative for arthralgias, back pain, flank pain, gait problem, myalgias, neck pain and neck stiffness.   Skin:  Negative for itching, rash and wound.   Neurological:  Negative for dizziness, extremity weakness, gait problem, headaches, light-headedness, numbness, seizures and  speech difficulty.   Hematological:  Negative for adenopathy. Does not bruise/bleed easily.   Psychiatric/Behavioral:  Negative for confusion, decreased concentration, depression, sleep disturbance and suicidal ideas. The patient is not nervous/anxious.          Medications:    Current Medications      Current Outpatient Medications:     clonazePAM (KlonoPIN) 0.5 MG tablet, 1 p.o. twice daily as needed anxiety., Disp: 60 tablet, Rfl: 1    zolpidem (AMBIEN) 10 MG tablet, Take 1 tablet by mouth At Night As Needed for Sleep., Disp: 90 tablet, Rfl: 1           Allergies        Allergies   Allergen Reactions    Penicillins Anaphylaxis            Medical History        Past Medical History:   Diagnosis Date    Anxiety      Endometrial polyp      GERD (gastroesophageal reflux disease)      History of kidney problems      Insomnia      Ovarian cyst      PONV (postoperative nausea and vomiting)              Surgical History         Past Surgical History:   Procedure Laterality Date    BREAST CYST EXCISION        BREAST SURGERY         SECTION        CHOLECYSTECTOMY        COLONOSCOPY N/A 2019     Procedure: COLONOSCOPY TO CECUM AND INTO TI WITH COLD BIOSPIES AND HOT SNARE POLYPECTOMIES WITH RESOLUTION CLIP X1;  Surgeon: Jordana Cruz MD;  Location: [facility] ENDOSCOPY;  Service: Gastroenterology    COLONOSCOPY W/ POLYPECTOMY N/A 2024     Procedure: COLONOSCOPY WITH POLYPECTOMY;  Surgeon: Ángel Aguiar MD;  Location: [facility] OR;  Service: Gastroenterology;  Laterality: N/A;  diverticulosis, sigmoid polyp    D & C HYSTEROSCOPY N/A 2019     Procedure: HYSTERSCOPE DILATATION AND CURETTAGE MYOSURE AND NOVASURE ABLATION;  Surgeon: Abimbola Dumont MD;  [place] [facility] [place] [facility] [place]  Service: Obstetrics/Gynecology    GALLBLADDER SURGERY        TUBAL ABDOMINAL LIGATION        WISDOM TOOTH EXTRACTION                      Family History   Problem Relation Age of Onset    Migraines Mother       Diverticulitis Mother      Other (giloblastoma) Father 77    Malig Hyperthermia Neg Hx           Social History   Social History            Socioeconomic History    Marital status: Legally     Number of children: 2   Tobacco Use    Smoking status: Former       Current packs/day: 0.00       Average packs/day: 0.5 packs/day for 15.0 years (7.5 ttl pk-yrs)       Types: Cigarettes       Start date:        Quit date: 2008       Years since quittin.0       Passive exposure: Past    Smokeless tobacco: Former   Vaping Use    Vaping status: Never Used   Substance and Sexual Activity    Alcohol use: Yes       Comment: SOCIAL, weekends    Drug use: No    Sexual activity: Defer         Patient drinks 1-2 servings of caffeine per day.        GYNECOLOGIC HISTORY:   . P: 2. AB: 0.  Last menstrual period: 26  Age at menarche: 11-14  Age at first childbirth: 31  Lactation/How long: N/a  Age at menopause: Premenopausal  Total years of oral contraceptive use: several years as a teenager   Total years of hormone replacement therapy: 0        Objective  PHYSICAL EXAMINATION  Vitals:    25 0642   BP: 127/77   Pulse: 74   Resp: 18   Temp: 97.8 °F (36.6 °C)   SpO2: 99%   ECOG 0 - Asymptomatic  General: NAD, well appearing  Psych: a&o x 3, normal mood and affect  Eyes: EOMI, no scleral icterus  ENMT: neck supple without masses or thyromegaly, mucus membranes moist  Resp: normal effort, CTAB  CV: RRR, no murmurs, no edema  GI: soft, NT, ND  MSK: normal gait, normal ROM in bilateral shoulders  Lymph nodes: no cervical, supraclavicular or axillary lymphadenopathy  Breast: symmetric, moderate size, grade 1 ptosis  Right: Medial periareolar scar, otherwise no visible abnormalities on inspection while seated, with arms raised or hands on hips. No masses or nipple abnormalities.  Left: Superior curvilinear scar, otherwise no visible abnormalities on inspection while seated, with arms raised or hands on hips.  No masses or nipple abnormalities.        I have independently reviewed her imaging and here are my findings:   In the right upper outer breast, there is a round hypoechoic mass located within a band of dense fibroglandular tissue.  Extremely dense tissue bilaterally on mammogram.        Assessment & Plan  Assessment:   1. 49 y.o. F with a new diagnosis of discordant right breast percutaneous biopsy for which surgical excision is recommended.     Plan:  -Right breast needle-localized excisional biopsy (intraoperative localization).     Agnes Mehta MD     Copied text in this note has been reviewed and is accurate as of 04/01/25.

## 2025-04-01 NOTE — OP NOTE
Operative Report    Patient Name:  Radha Barry  YOB: 1975    Date of Surgery:  4/1/2025    Pre-op Diagnosis:   Abnormal breast biopsy [R89.7]    Post-Op Diagnosis:  Abnormal breast biopsy [R89.7]     Procedures:  1.  Right breast intraoperative ultrasound-guided needle localization  2.  Right breast needle-localized excisional biopsy      Staff:  Surgeon(s):  Agnes Mehta MD      Anesthesia: General    Estimated Blood Loss: 5 mL    Implants:    Nothing was implanted during the procedure    Specimen:          Specimens       ID Source Type Tests Collected By Collected At Frozen?    A Breast, Right Tissue TISSUE PATHOLOGY EXAM   Agnes Mehta MD 4/1/25 0832 No    Description: right breast excisional biopsy short stitch superior long stitch lateral    Comment: Fresh for permanent            Findings: Wire and clip in good position in specimen radiograph.    Complications: None     Indications: The patient is a 49 y.o. F with a discordant right breast percutaneous biopsy for which surgical excision is recommended. The risks and benefits of surgery were discussed preoperatively and she understood and agreed to proceed.     Description of Procedure:  The patient was identified in the preoperative area and brought to the operating room and placed supine on the table. Patient verification was performed and general anesthesia was induced. I used intraoperative ultrasound to identify the biopsy clip at 9:30.  Her skin was prepped with ChloraPrep and using sterile technique, I placed a 5 cm [place] needle/wire through the mass under ultrasound guidance.  Accurate position was again verified with ultrasound after placement. The patient was prepped and draped in sterile fashion with the wire/needle prepped into the field. A time out was performed and all were in agreement. I confirmed that the patient had received preoperative antibiotics.      On the skin, I marked the suggested location of  the lesion based on the mammographic localization imaging. The skin was infiltrated with local anesthesia. A lateral periareolar incision was made with a scalpel and carried down through the dermis with sharp dissection. Flaps were raised according to the planned dissection. An anterior flap was raised first. Dissection was then carried out superiorly, inferiorly, medially, and laterally. The wire was delivered into the wound. The posterior dissection was completed and the specimen removed. The specimen was oriented with a short stitch superiorly and a long stitch laterally. Specimen radiograph showed the wire and clip in good position.     The breast cavity was irrigated and hemostasis achieved. The remaining local anesthesia was infiltrated into the breast cavity. The breast parenchyma was brought together with 3-0 vicryl stitches. The deep dermis was closed with interrupted 3-0 vicryl stitches. The skin was closed with 4-0 subcuticular running monocryl and covered with skin glue. Counts were correct at the end of the case. The patient was awakened from anesthesia and taken to the PACU in stable condition.    Agnes Mehta MD     Date: 4/1/2025  Time: 10:49 EDT

## 2025-04-01 NOTE — ANESTHESIA POSTPROCEDURE EVALUATION
Patient: Radha Barry    Procedure Summary       Date: 04/01/25 Room / Location: [facility] OR [address] [place] [address] MAIN OR    Anesthesia Start: 0755 Anesthesia Stop: 0927    Procedure: Right breast needle-localized excisional biopsy (intraoperative localization) (Right: Breast) Diagnosis:       Abnormal breast biopsy      (Abnormal breast biopsy [R89.7])    Surgeons: Agnes Mehta MD Provider: Helio Mix MD    Anesthesia Type: general ASA Status: 2            Anesthesia Type: general    Vitals  Vitals Value Taken Time   /68 04/01/25 10:00   Temp 36.6 °C (97.8 °F) 04/01/25 09:26   Pulse 79 04/01/25 10:13   Resp 18 04/01/25 10:00   SpO2 97 % 04/01/25 10:13   Vitals shown include unfiled device data.        Post Anesthesia Care and Evaluation    Patient location during evaluation: bedside  Patient participation: complete - patient participated  Level of consciousness: sleepy but conscious  Pain score: 0  Pain management: adequate    Airway patency: patent  Anesthetic complications: No anesthetic complications    Cardiovascular status: acceptable  Respiratory status: acceptable  Hydration status: acceptable    Comments: /81   Pulse 75   Temp 36.6 °C (97.8 °F) (Oral)   Resp 18   SpO2 93%

## 2025-04-01 NOTE — ANESTHESIA PREPROCEDURE EVALUATION
Anesthesia Evaluation     Patient summary reviewed and Nursing notes reviewed   history of anesthetic complications:   NPO Solid Status: > 8 hours  NPO Liquid Status: > 4 hours           Airway   Mallampati: II  Neck ROM: full  No difficulty expected  Dental - normal exam     Pulmonary     breath sounds clear to auscultation  (+) a smoker Former,  Cardiovascular     Rhythm: regular        Neuro/Psych  (+) psychiatric history Anxiety  GI/Hepatic/Renal/Endo    (+) GERD, renal disease-    Musculoskeletal     Abdominal   (+) obese   Substance History      OB/GYN          Other                    Anesthesia Plan    ASA 2     general     intravenous induction     Anesthetic plan, risks, benefits, and alternatives have been provided, discussed and informed consent has been obtained with: patient.    CODE STATUS:

## 2025-04-02 LAB
CYTO UR: NORMAL
LAB AP CASE REPORT: NORMAL
LAB AP CLINICAL INFORMATION: NORMAL
LAB AP INTRADEPARTMENTAL CONSULT: NORMAL
PATH REPORT.FINAL DX SPEC: NORMAL
PATH REPORT.GROSS SPEC: NORMAL

## 2025-04-03 ENCOUNTER — RESULTS FOLLOW-UP (OUTPATIENT)
Dept: PERIOP | Facility: HOSPITAL | Age: 50
End: 2025-04-03
Payer: COMMERCIAL

## 2025-04-03 NOTE — TELEPHONE ENCOUNTER
I called Ms. Barry to discuss her pathology report. She is recovering well from surgery. I will see her at her scheduled postop appointment.     Agnes Mehta MD

## 2025-04-16 ENCOUNTER — OFFICE VISIT (OUTPATIENT)
Dept: SURGERY | Facility: CLINIC | Age: 50
End: 2025-04-16
Payer: COMMERCIAL

## 2025-04-16 VITALS
WEIGHT: 204 LBS | RESPIRATION RATE: 16 BRPM | DIASTOLIC BLOOD PRESSURE: 80 MMHG | OXYGEN SATURATION: 99 % | HEIGHT: 67 IN | SYSTOLIC BLOOD PRESSURE: 122 MMHG | BODY MASS INDEX: 32.02 KG/M2 | HEART RATE: 84 BPM

## 2025-04-16 DIAGNOSIS — Z48.89 POSTOPERATIVE VISIT: Primary | ICD-10-CM

## 2025-04-16 PROCEDURE — 99024 POSTOP FOLLOW-UP VISIT: CPT | Performed by: SURGERY

## 2025-04-16 NOTE — PROGRESS NOTES
[facility]     Referring Provider: Rtia Mayo MD     Chief complaint: Postoperative visit     Subjective   HPI:   1/29/25:  Ms. Radha Barry is a 50 yo woman, seen at the request of Dr. Rita Mayo, for evaluation after a discordant breast biopsy.  She was called back after screening mammogram for new right breast findings.  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 Prior to the biopsy, she denies any breast lumps, pain, skin changes, or nipple discharge.  She has a past history of bilateral cyst aspirations and needle biopsies, as well as 2 left surgical biopsies and 1 right surgical biopsy.  Most of these were in her 20s.  She does not have any biopsy clips on mammogram aside from the recently placed one. She denies any family history of breast or ovarian cancer.     4/16/2025, Interval History:  Preoperatively I identified her as having an increased lifetime risk of breast cancer and I ordered a screening MRI.  She ultimately decided not to do this.  Preoperative labs detected a low hemoglobin and surgery was delayed for anemia workup and treatment per her PCP.  She underwent right breast excisional biopsy on 4/1/25 with benign final pathology. She has been recovering well and has no complaints.        Breast history/imaging (updated 4/16/2025):    1/22/2019, Screening MMG with [facility]):  Extremely dense.  No suspicious masses, significant calcifications or other abnormalities are seen.  There is no mammographic evidence of malignancy.  BI-RADS 1: Negative      10/21/2024, Screening MMG with [facility] ([facility]):  Extremely dense.   There is a focal asymmetry measuring 18 mm with associated architectural distortion seen in the posterior one-third region of the right breast at 10 o'clock located 8 centimeters from the nipple.  In the left breast, no suspicious masses, significant calcifications or other  abnormalities are seen.  BI-RADS 0: Incomplete      11/7/2024, Right Diagnostic MMG with [facility] & Right Breast US ([facility]):  MMG:   On the present examination, there is a focal asymmetry measuring 14 mm with associated architectural distortion in the posterior one-third region of the right breast at 10 o'clock located 7 centimeters from the nipple.  US:  Ultrasound demonstrates an irregular non-parallel hypoechoic avascular mass with spiculated margins measuring 8 x 10 x 7 mm in the posterior one-third region of the right breast at 10 o'clock located 7 centimeters from the nipple. There are decreased posterior echoes; edge shadows are excluded.  Visualized axillary lymph nodes are normal.  BI-RADS 4C: Suspicious      11/12/2024, Right Breast, US-Guided Biopsy ([facility]):  1.  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               -No atypical hyperplasia, in situ nor invasive carcinoma identified.  -Pathology results are radiology-pathology DISCORDANT.    4/1/2025, Right Breast Excisional Biopsy:  1.  Breast, right, lumpectomy: (15 g)  A.  Radial scar with peripheral cysts displaying columnar cell change and micro papilloma identified adjacent to vision clip with biopsy site change, completely excised.      Review of Systems:  See interval history.       Medications:    Current Outpatient Medications:     clonazePAM (KlonoPIN) 0.5 MG tablet, Take 1 tablet by mouth 2 (Two) Times a Day As Needed for Anxiety. TAKE 1 BY MOUTH TWICE DAILY AS NEEDED ANXIETY., Disp: 30 tablet, Rfl: 1    diphenhydrAMINE (BENADRYL) 25 mg capsule, Take 1 capsule by mouth Every 6 (Six) Hours As Needed for Itching., Disp: , Rfl:     Ferrous Sulfate (Iron) 28 MG tablet, Take 1 tablet by mouth 2 (Two) Times a Day., Disp: , Rfl:     Joyeaux 0.1-20 MG-MCG(21) per tablet, Take 1 tablet by mouth Daily. (Patient not taking:  Reported on 4/1/2025), Disp: , Rfl:     omeprazole (priLOSEC) 40 MG capsule, Take 1 capsule by mouth Daily As Needed., Disp: , Rfl:     sulfamethoxazole-trimethoprim (BACTRIM DS,SEPTRA DS) 800-160 MG per tablet, Take 1 tablet by mouth Take As Directed. PRIOR TO INTERCOURSE, Disp: , Rfl:     zolpidem (AMBIEN) 10 MG tablet, Take 1 tablet by mouth At Night As Needed for Sleep., Disp: 90 tablet, Rfl: 1      Allergies   Allergen Reactions    Penicillins Anaphylaxis       Family History   Problem Relation Age of Onset    Migraines Mother     Diverticulitis Mother     Other (giloblastoma) Father 77    Malig Hyperthermia Neg Hx        Objective   PHYSICAL EXAMINATION:   Vitals:    04/16/25 0954   BP: 122/80   Pulse: 84   Resp: 16   SpO2: 99%     ECOG 0 - Asymptomatic  General: NAD, well appearing  Psych: a&o x 3, normal mood and affect  Eyes: EOMI, no scleral icterus  ENMT: neck supple without masses or thyromegaly, mucus membranes moist  MSK: normal gait, normal ROM in bilateral shoulders  Lymph nodes: no cervical, supraclavicular or axillary lymphadenopathy  Breast: symmetric, moderate size, grade 1 ptosis   Right: Prior medial periareolar scar and new well-healing lateral periareolar incision with minimal swelling.  Left: deferred.      Assessment & Plan   Assessment:   1. 50 y.o. F sp right breast excisional biopsy for a discordant percutaneous biopsy on 4/1/2025 with benign final pathology.  2.  She has an increased lifetime risk of breast cancer (26%, TCv8, calculated 1/29/2025).  At this point she is not interested in annual screening MRI, however she would like to be followed in our high risk breast clinic.    Plan:  - Follow-up in 10/2025 after mammogram with NP.    Agnes Mehta MD      CC:  MD Adalberto Lo MD Elena Salerno, MD

## 2025-04-16 NOTE — LETTER
April 16, 2025     Rita Mayo MD  [address] [facility]  [address]  [place] [id number]    Patient: Radha Barry   YOB: 1975   Date of Visit: 4/16/2025     Dear Rita Mayo MD:       Thank you for referring Radha Barry to me for evaluation. Below are the relevant portions of my assessment and plan of care.    If you have questions, please do not hesitate to call me. I look forward to following Radha along with you.         Sincerely,        Agnes Mehta MD        CC: MD Abimbola Ha MD Warren, Agnes BUSH MD  04/16/25 1014  Sign when Signing Visit  [facility]     Referring Provider: Rita Mayo MD     Chief complaint: Postoperative visit     Subjective  HPI:   1/29/25:  Ms. Radha Barry is a 50 yo woman, seen at the request of Dr. Rita Mayo, for evaluation after a discordant breast biopsy.  She was called back after screening mammogram for new right breast findings.  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 Prior to the biopsy, she denies any breast lumps, pain, skin changes, or nipple discharge.  She has a past history of bilateral cyst aspirations and needle biopsies, as well as 2 left surgical biopsies and 1 right surgical biopsy.  Most of these were in her 20s.  She does not have any biopsy clips on mammogram aside from the recently placed one. She denies any family history of breast or ovarian cancer.     4/16/2025, Interval History:  Preoperatively I identified her as having an increased lifetime risk of breast cancer and I ordered a screening MRI.  She ultimately decided not to do this.  Preoperative labs detected a low hemoglobin and surgery was delayed for anemia workup and treatment per her PCP.  She underwent right breast excisional biopsy on 4/1/25 with benign final pathology. She has been recovering well and has no complaints.         Breast history/imaging (updated 4/16/2025):    1/22/2019, Screening MMG with [facility] ([facility]):  Extremely dense.  No suspicious masses, significant calcifications or other abnormalities are seen.  There is no mammographic evidence of malignancy.  BI-RADS 1: Negative      10/21/2024, Screening MMG with [facility] ([facility]):  Extremely dense.   There is a focal asymmetry measuring 18 mm with associated architectural distortion seen in the posterior one-third region of the right breast at 10 o'clock located 8 centimeters from the nipple.  In the left breast, no suspicious masses, significant calcifications or other abnormalities are seen.  BI-RADS 0: Incomplete      11/7/2024, Right Diagnostic MMG with [facility] & Right Breast US ([facility]):  MMG:   On the present examination, there is a focal asymmetry measuring 14 mm with associated architectural distortion in the posterior one-third region of the right breast at 10 o'clock located 7 centimeters from the nipple.  US:  Ultrasound demonstrates an irregular non-parallel hypoechoic avascular mass with spiculated margins measuring 8 x 10 x 7 mm in the posterior one-third region of the right breast at 10 o'clock located 7 centimeters from the nipple. There are decreased posterior echoes; edge shadows are excluded.  Visualized axillary lymph nodes are normal.  BI-RADS 4C: Suspicious      11/12/2024, Right Breast, US-Guided Biopsy ([facility]):  1.  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               -No atypical hyperplasia, in situ nor invasive carcinoma identified.  -Pathology results are radiology-pathology DISCORDANT.    4/1/2025, Right Breast Excisional Biopsy:  1.  Breast, right, lumpectomy: (15 g)  A.  Radial scar with peripheral cysts displaying columnar cell change and micro papilloma identified adjacent to vision clip with biopsy site change,  completely excised.      Review of Systems:  See interval history.       Medications:    Current Outpatient Medications:   •  clonazePAM (KlonoPIN) 0.5 MG tablet, Take 1 tablet by mouth 2 (Two) Times a Day As Needed for Anxiety. TAKE 1 BY MOUTH TWICE DAILY AS NEEDED ANXIETY., Disp: 30 tablet, Rfl: 1  •  diphenhydrAMINE (BENADRYL) 25 mg capsule, Take 1 capsule by mouth Every 6 (Six) Hours As Needed for Itching., Disp: , Rfl:   •  Ferrous Sulfate (Iron) 28 MG tablet, Take 1 tablet by mouth 2 (Two) Times a Day., Disp: , Rfl:   •  Joyeaux 0.1-20 MG-MCG(21) per tablet, Take 1 tablet by mouth Daily. (Patient not taking: Reported on 4/1/2025), Disp: , Rfl:   •  omeprazole (priLOSEC) 40 MG capsule, Take 1 capsule by mouth Daily As Needed., Disp: , Rfl:   •  sulfamethoxazole-trimethoprim (BACTRIM DS,SEPTRA DS) 800-160 MG per tablet, Take 1 tablet by mouth Take As Directed. PRIOR TO INTERCOURSE, Disp: , Rfl:   •  zolpidem (AMBIEN) 10 MG tablet, Take 1 tablet by mouth At Night As Needed for Sleep., Disp: 90 tablet, Rfl: 1      Allergies   Allergen Reactions   • Penicillins Anaphylaxis       Family History   Problem Relation Age of Onset   • Migraines Mother    • Diverticulitis Mother    • Other (giloblastoma) Father 77   • Malig Hyperthermia Neg Hx        Objective  PHYSICAL EXAMINATION:   Vitals:    04/16/25 0954   BP: 122/80   Pulse: 84   Resp: 16   SpO2: 99%     ECOG 0 - Asymptomatic  General: NAD, well appearing  Psych: a&o x 3, normal mood and affect  Eyes: EOMI, no scleral icterus  ENMT: neck supple without masses or thyromegaly, mucus membranes moist  MSK: normal gait, normal ROM in bilateral shoulders  Lymph nodes: no cervical, supraclavicular or axillary lymphadenopathy  Breast: symmetric, moderate size, grade 1 ptosis   Right: Prior medial periareolar scar and new well-healing lateral periareolar incision with minimal swelling.  Left: deferred.      Assessment & Plan  Assessment:   1. 50 y.o. F sp right breast  excisional biopsy for a discordant percutaneous biopsy on 4/1/2025 with benign final pathology.  2.  She has an increased lifetime risk of breast cancer (26%, TCv8, calculated 1/29/2025).  At this point she is not interested in annual screening MRI, however she would like to be followed in our high risk breast clinic.    Plan:  - Follow-up in 10/2025 after mammogram with NP.    Agnes Mehta MD      CC:  MD Adalberto Lo MD Elena Salerno, MD

## 2025-04-21 ENCOUNTER — TELEPHONE (OUTPATIENT)
Dept: SURGERY | Facility: CLINIC | Age: 50
End: 2025-04-21
Payer: COMMERCIAL

## 2025-05-13 DIAGNOSIS — F41.9 ANXIETY: ICD-10-CM

## 2025-05-13 RX ORDER — CLONAZEPAM 0.5 MG/1
0.5 TABLET ORAL 2 TIMES DAILY PRN
Qty: 30 TABLET | Refills: 1 | Status: SHIPPED | OUTPATIENT
Start: 2025-05-13

## 2025-06-28 DIAGNOSIS — F41.9 ANXIETY: ICD-10-CM

## 2025-06-28 DIAGNOSIS — F51.01 PRIMARY INSOMNIA: ICD-10-CM

## 2025-06-28 RX ORDER — CLONAZEPAM 0.5 MG/1
0.5 TABLET ORAL 2 TIMES DAILY PRN
Qty: 30 TABLET | Refills: 1 | Status: CANCELLED | OUTPATIENT
Start: 2025-06-28

## 2025-06-30 DIAGNOSIS — F51.01 PRIMARY INSOMNIA: ICD-10-CM

## 2025-06-30 DIAGNOSIS — F41.9 ANXIETY: ICD-10-CM

## 2025-07-01 RX ORDER — ZOLPIDEM TARTRATE 10 MG/1
10 TABLET ORAL NIGHTLY PRN
Qty: 90 TABLET | Refills: 1 | Status: SHIPPED | OUTPATIENT
Start: 2025-07-01 | End: 2025-07-01

## 2025-07-01 RX ORDER — ZOLPIDEM TARTRATE 10 MG/1
10 TABLET ORAL NIGHTLY PRN
Qty: 90 TABLET | Refills: 0 | Status: SHIPPED | OUTPATIENT
Start: 2025-07-01

## 2025-07-01 RX ORDER — CLONAZEPAM 0.5 MG/1
0.5 TABLET ORAL 2 TIMES DAILY PRN
Qty: 30 TABLET | Refills: 0 | Status: SHIPPED | OUTPATIENT
Start: 2025-07-01

## 2025-07-10 ENCOUNTER — OFFICE VISIT (OUTPATIENT)
Dept: FAMILY MEDICINE CLINIC | Facility: CLINIC | Age: 50
End: 2025-07-10
Payer: COMMERCIAL

## 2025-07-10 VITALS
HEART RATE: 80 BPM | OXYGEN SATURATION: 98 % | DIASTOLIC BLOOD PRESSURE: 78 MMHG | WEIGHT: 202.7 LBS | SYSTOLIC BLOOD PRESSURE: 106 MMHG | HEIGHT: 67 IN | RESPIRATION RATE: 16 BRPM | BODY MASS INDEX: 31.81 KG/M2

## 2025-07-10 DIAGNOSIS — M79.604 RIGHT LEG PAIN: Primary | ICD-10-CM

## 2025-07-10 PROCEDURE — 99213 OFFICE O/P EST LOW 20 MIN: CPT | Performed by: INTERNAL MEDICINE

## 2025-07-10 RX ORDER — METHYLPREDNISOLONE 4 MG/1
TABLET ORAL
Qty: 21 TABLET | Refills: 0 | Status: SHIPPED | OUTPATIENT
Start: 2025-07-10

## 2025-07-11 NOTE — PROGRESS NOTES
Subjective   Radha Barry is a 50 y.o. female. Patient is here today for   Chief Complaint   Patient presents with    Leg Pain        History of Present Illness  She has right lower extremity pain and times of lumbar spine pain and right medial and lateral thigh pain.  This been going on for many weeks now.  She notes no precipitating event.  Leg Pain       History of Present Illness  The patient presents for evaluation of back pain and skin sensitivity.    She reports experiencing back pain, which she describes as a burning sensation. This discomfort extends to her leg, particularly on the right side, and is exacerbated by touch, even from clothing. The pain intensifies when she lies down, making it difficult for her to sleep. She also mentions a recent increase in her back pain, which has caused her to limp on her right side. Walking exacerbates her pain, but prolonged sitting also causes discomfort. She rates her pain as an 8 out of 10. She has been taking Tylenol and ibuprofen for the pain, but these medications do not alleviate the sensitivity. She suspects that her symptoms may be due to nerve pain. She has been using a treadmill and rower, which initially caused lower back pain, but she thought it would resolve on its own.    Additionally, she has noticed non-itchy bumps on her leg and groin area, which are painful and seem to be spreading upwards. She describes a sensation of pulling in her groin and has identified several bumps in this area. She also reports a couple of bumps on her back. These symptoms have been present for approximately a week.    FAMILY HISTORY  Her father has cancer that grew back even bigger than it was.      Vitals:    07/10/25 0846   BP: 106/78   Pulse: 80   Resp: 16   SpO2: 98%     Body mass index is 31.74 kg/m².    Past Medical History:   Diagnosis Date    Abnormal uterine bleeding (AUB)     Anxiety     Breast mass, right     Endometrial polyp     GERD (gastroesophageal reflux  disease)     Insomnia     Iron deficiency anemia     Ovarian cyst     PONV (postoperative nausea and vomiting)       Allergies   Allergen Reactions    Penicillins Anaphylaxis      Social History     Socioeconomic History    Marital status: Legally     Number of children: 2   Tobacco Use    Smoking status: Former     Current packs/day: 0.00     Average packs/day: 0.5 packs/day for 15.0 years (7.5 ttl pk-yrs)     Types: Cigarettes     Start date:      Quit date: 2008     Years since quittin.4     Passive exposure: Past    Smokeless tobacco: Former   Vaping Use    Vaping status: Never Used   Substance and Sexual Activity    Alcohol use: Yes     Comment: SOCIAL, weekends    Drug use: No    Sexual activity: Defer        Current Outpatient Medications:     clonazePAM (KlonoPIN) 0.5 MG tablet, Take 1 tablet by mouth 2 (Two) Times a Day As Needed for Anxiety. TAKE 1 BY MOUTH TWICE DAILY AS NEEDED ANXIETY., Disp: 30 tablet, Rfl: 0    diphenhydrAMINE (BENADRYL) 25 mg capsule, Take 1 capsule by mouth Every 6 (Six) Hours As Needed for Itching., Disp: , Rfl:     Ferrous Sulfate (Iron) 28 MG tablet, Take 1 tablet by mouth 2 (Two) Times a Day., Disp: , Rfl:     Joyeaux 0.1-20 MG-MCG(21) per tablet, Take 1 tablet by mouth Daily., Disp: , Rfl:     omeprazole (priLOSEC) 40 MG capsule, Take 1 capsule by mouth Daily As Needed., Disp: , Rfl:     sulfamethoxazole-trimethoprim (BACTRIM DS,SEPTRA DS) 800-160 MG per tablet, Take 1 tablet by mouth Take As Directed. PRIOR TO INTERCOURSE, Disp: , Rfl:     zolpidem (AMBIEN) 10 MG tablet, Take 1 tablet by mouth At Night As Needed for Sleep., Disp: 90 tablet, Rfl: 0    methylPREDNISolone (MEDROL) 4 MG dose pack, Take as directed on package instructions., Disp: 21 tablet, Rfl: 0     Objective     Review of Systems   Musculoskeletal:         Right lower extremity pain as described in HPI       Physical Exam  Constitutional:       Appearance: Normal appearance.    Cardiovascular:      Rate and Rhythm: Regular rhythm.      Heart sounds: Normal heart sounds. No murmur heard.     No gallop.   Musculoskeletal:      Comments: She has no pain to palpation of the lumbar spine.  There is no abnormality on testing.  I did not check sensitivity to light touch and pinprick.   Neurological:      Mental Status: She is alert and oriented to person, place, and time.   Psychiatric:         Mood and Affect: Mood normal.         Behavior: Behavior normal.         Thought Content: Thought content normal.       Physical Exam      Results      Assessment & Plan    Problems Addressed this Visit    None  Visit Diagnoses         Right leg pain    -  Primary    Relevant Orders    EMG & Nerve Conduction Test    EMG 1 Limb          Diagnoses         Codes Comments      Right leg pain    -  Primary ICD-10-CM: M79.604  ICD-9-CM: 729.5           Assessment & Plan  1. Back pain.  - The back pain may be indicative of sciatica..  - Presence of small skin lesions is likely unrelated to the reported pain.  - An electronerve conduction study will be ordered to further investigate the cause of the pain.  - A Medrol Dosepak will be prescribed for a week to manage the pain. If the pain persists, further evaluation will be conducted based on the results of the nerve conduction study.    2. Skin sensitivity.  - The skin sensitivity described as a burning sensation and sensitivity to touch has been ongoing for about a week.  - The sensitivity is likely due to nerve compression, possibly involving the lateral femoral cutaneous nerve.  - The patient is advised to continue monitoring the symptoms and report any changes.  - The electronerve conduction study will help determine if there is a nerve-related cause.    Follow-up  - The patient will follow up in 08/2025.      No follow-ups on file.    Patient or patient representative verbalized consent for the use of Ambient Listening during the visit with  Adalberto Livingston  MD for chart documentation. 7/11/2025  13:41 EDT

## 2025-08-02 DIAGNOSIS — F41.9 ANXIETY: ICD-10-CM

## 2025-08-04 RX ORDER — CLONAZEPAM 0.5 MG/1
0.5 TABLET ORAL 2 TIMES DAILY PRN
Qty: 30 TABLET | Refills: 0 | Status: SHIPPED | OUTPATIENT
Start: 2025-08-04

## 2025-08-24 DIAGNOSIS — F41.9 ANXIETY: ICD-10-CM

## 2025-08-26 RX ORDER — CLONAZEPAM 0.5 MG/1
0.5 TABLET ORAL 2 TIMES DAILY PRN
Qty: 30 TABLET | Refills: 0 | Status: SHIPPED | OUTPATIENT
Start: 2025-08-26

## (undated) DEVICE — GOWN,SIRUS,NON REINFRCD,LARGE,SET IN SL: Brand: MEDLINE

## (undated) DEVICE — SOL IRR H2O BTL 1000ML STRL

## (undated) DEVICE — SUT MNCRYL PLS ANTIB UD 4/0 PS2 18IN

## (undated) DEVICE — LINER SURG CANSTR SXN S/RIGD 1500CC

## (undated) DEVICE — KT ORCA ORCAPOD DISP STRL

## (undated) DEVICE — GLV SURG SENSICARE POLYISPRN W/ALOE PF LF 6.5 GRN STRL

## (undated) DEVICE — SUT SILK 2/0 SH 30IN K833H

## (undated) DEVICE — APPL CHLORAPREP HI/LITE 26ML ORNG

## (undated) DEVICE — EXOFIN PRECISION PEN HIGH VISCOSITY TOPICAL SKIN ADHESIVE: Brand: EXOFIN PRECISION PEN, 1G

## (undated) DEVICE — GLV SURG SENSICARE PI MIC PF SZ6.5 LF STRL

## (undated) DEVICE — TUBING, SUCTION, 1/4" X 10', STRAIGHT: Brand: MEDLINE

## (undated) DEVICE — GLV SURG TRIUMPH CLASSIC PF LTX 7 STRL

## (undated) DEVICE — FRCP BX RADJAW4 NDL 2.8 240CM LG OG BX40

## (undated) DEVICE — GLV SURG BIOGEL LTX PF 7

## (undated) DEVICE — LEGGINGS, PAIR, 31X48, STERILE: Brand: MEDLINE

## (undated) DEVICE — ST TBG ENDOMAT HYSTSCPY

## (undated) DEVICE — NDL LOCALIZER HOMER MAALOK ULTR 20G 3CM

## (undated) DEVICE — ADAPT CLN BIOGUARD AIR/H2O DISP

## (undated) DEVICE — SYR LL W/SCALE/MARK 3ML STRL

## (undated) DEVICE — OSC HYSTEROSCOPY: Brand: MEDLINE INDUSTRIES, INC.

## (undated) DEVICE — Device: Brand: DEFENDO AIR/WATER/SUCTION AND BIOPSY VALVE

## (undated) DEVICE — GLV SURG SENSICARE PI MIC PF SZ8 LF STRL

## (undated) DEVICE — JACKT LAB F/R KNIT CUFF/COLR XLG BLU

## (undated) DEVICE — PAD SANI MAXI W/ADHS SNG WRP 11IN

## (undated) DEVICE — SEAL HYSTERSCOPE/OUTFLOW CHANNEL MYOSURE

## (undated) DEVICE — VIAL FORMALIN CAP 10P 40ML

## (undated) DEVICE — ANTIBACTERIAL UNDYED BRAIDED (POLYGLACTIN 910), SYNTHETIC ABSORBABLE SUTURE: Brand: COATED VICRYL

## (undated) DEVICE — HYPODERMIC SAFETY NEEDLE: Brand: MONOJECT

## (undated) DEVICE — LOU D & C HYSTEROSCOPY: Brand: MEDLINE INDUSTRIES, INC.

## (undated) DEVICE — SINGLE-USE BIOPSY FORCEPS: Brand: RADIAL JAW 4

## (undated) DEVICE — BW-412T DISP COMBO CLEANING BRUSH: Brand: SINGLE USE COMBINATION CLEANING BRUSH

## (undated) DEVICE — PROB ABL ENDOMTRL NOVASURE/G4 W/SURESND

## (undated) DEVICE — Device

## (undated) DEVICE — CANN NASL CO2 TRULINK W/O2 A/

## (undated) DEVICE — THE SINGLE USE ETRAP – POLYP TRAP IS USED FOR SUCTION RETRIEVAL OF ENDOSCOPICALLY REMOVED POLYPS.: Brand: ETRAP

## (undated) DEVICE — THE TORRENT IRRIGATION SCOPE CONNECTOR IS USED WITH THE TORRENT IRRIGATION TUBING TO PROVIDE IRRIGATION FLUIDS SUCH AS STERILE WATER DURING GASTROINTESTINAL ENDOSCOPIC PROCEDURES WHEN USED IN CONJUNCTION WITH AN IRRIGATION PUMP (OR ELECTROSURGICAL UNIT).: Brand: TORRENT

## (undated) DEVICE — SYR LL TP 10ML STRL

## (undated) DEVICE — ELECTRD BLD EZ CLN MOD XLNG 2.75IN

## (undated) DEVICE — STPLR SKIN VISISTAT WD 35CT

## (undated) DEVICE — DEV TISS REMOV MYOSURE REACH

## (undated) DEVICE — SNAR POLYP SENSATION STDOVL 27 240 BX40

## (undated) DEVICE — PK CHST BRST 40